# Patient Record
Sex: FEMALE | Race: WHITE | NOT HISPANIC OR LATINO | Employment: PART TIME | URBAN - METROPOLITAN AREA
[De-identification: names, ages, dates, MRNs, and addresses within clinical notes are randomized per-mention and may not be internally consistent; named-entity substitution may affect disease eponyms.]

---

## 2003-02-20 LAB — EXTERNAL HIV SCREEN: NORMAL

## 2018-01-09 ENCOUNTER — GENERIC CONVERSION - ENCOUNTER (OUTPATIENT)
Dept: OTHER | Facility: OTHER | Age: 43
End: 2018-01-09

## 2018-01-11 ENCOUNTER — ALLSCRIPTS OFFICE VISIT (OUTPATIENT)
Dept: PSYCHOLOGY | Facility: CLINIC | Age: 43
End: 2018-01-11
Payer: COMMERCIAL

## 2018-01-11 PROCEDURE — G0410 GRP PSYCH PARTIAL HOSP 45-50: HCPCS | Performed by: PSYCHIATRY & NEUROLOGY

## 2018-01-11 PROCEDURE — 90791 PSYCH DIAGNOSTIC EVALUATION: CPT | Performed by: PSYCHIATRY & NEUROLOGY

## 2018-01-11 PROCEDURE — G0177 OPPS/PHP; TRAIN & EDUC SERV: HCPCS | Performed by: PSYCHIATRY & NEUROLOGY

## 2018-01-11 PROCEDURE — G0176 OPPS/PHP;ACTIVITY THERAPY: HCPCS | Performed by: PSYCHIATRY & NEUROLOGY

## 2018-01-12 ENCOUNTER — APPOINTMENT (OUTPATIENT)
Dept: PSYCHOLOGY | Facility: CLINIC | Age: 43
End: 2018-01-12
Payer: COMMERCIAL

## 2018-01-12 ENCOUNTER — GENERIC CONVERSION - ENCOUNTER (OUTPATIENT)
Dept: OTHER | Facility: OTHER | Age: 43
End: 2018-01-12

## 2018-01-12 PROCEDURE — G0177 OPPS/PHP; TRAIN & EDUC SERV: HCPCS | Performed by: PSYCHIATRY & NEUROLOGY

## 2018-01-12 PROCEDURE — G0176 OPPS/PHP;ACTIVITY THERAPY: HCPCS | Performed by: PSYCHIATRY & NEUROLOGY

## 2018-01-12 PROCEDURE — G0410 GRP PSYCH PARTIAL HOSP 45-50: HCPCS | Performed by: PSYCHIATRY & NEUROLOGY

## 2018-01-15 ENCOUNTER — GENERIC CONVERSION - ENCOUNTER (OUTPATIENT)
Dept: OTHER | Facility: OTHER | Age: 43
End: 2018-01-15

## 2018-01-15 ENCOUNTER — APPOINTMENT (OUTPATIENT)
Dept: PSYCHOLOGY | Facility: CLINIC | Age: 43
End: 2018-01-15
Payer: COMMERCIAL

## 2018-01-15 PROCEDURE — G0410 GRP PSYCH PARTIAL HOSP 45-50: HCPCS | Performed by: PSYCHIATRY & NEUROLOGY

## 2018-01-15 PROCEDURE — G0177 OPPS/PHP; TRAIN & EDUC SERV: HCPCS | Performed by: PSYCHIATRY & NEUROLOGY

## 2018-01-15 PROCEDURE — G0176 OPPS/PHP;ACTIVITY THERAPY: HCPCS | Performed by: PSYCHIATRY & NEUROLOGY

## 2018-01-16 ENCOUNTER — GENERIC CONVERSION - ENCOUNTER (OUTPATIENT)
Dept: OTHER | Facility: OTHER | Age: 43
End: 2018-01-16

## 2018-01-17 ENCOUNTER — GENERIC CONVERSION - ENCOUNTER (OUTPATIENT)
Dept: OTHER | Facility: OTHER | Age: 43
End: 2018-01-17

## 2018-01-18 ENCOUNTER — APPOINTMENT (OUTPATIENT)
Dept: PSYCHOLOGY | Facility: CLINIC | Age: 43
End: 2018-01-18
Payer: COMMERCIAL

## 2018-01-18 ENCOUNTER — GENERIC CONVERSION - ENCOUNTER (OUTPATIENT)
Dept: OTHER | Facility: OTHER | Age: 43
End: 2018-01-18

## 2018-01-18 PROCEDURE — G0176 OPPS/PHP;ACTIVITY THERAPY: HCPCS | Performed by: PSYCHIATRY & NEUROLOGY

## 2018-01-18 PROCEDURE — G0410 GRP PSYCH PARTIAL HOSP 45-50: HCPCS | Performed by: PSYCHIATRY & NEUROLOGY

## 2018-01-18 PROCEDURE — G0177 OPPS/PHP; TRAIN & EDUC SERV: HCPCS | Performed by: PSYCHIATRY & NEUROLOGY

## 2018-01-19 ENCOUNTER — APPOINTMENT (OUTPATIENT)
Dept: LAB | Facility: CLINIC | Age: 43
End: 2018-01-19
Payer: COMMERCIAL

## 2018-01-19 ENCOUNTER — GENERIC CONVERSION - ENCOUNTER (OUTPATIENT)
Dept: OTHER | Facility: OTHER | Age: 43
End: 2018-01-19

## 2018-01-19 ENCOUNTER — APPOINTMENT (OUTPATIENT)
Dept: PSYCHOLOGY | Facility: CLINIC | Age: 43
End: 2018-01-19
Payer: COMMERCIAL

## 2018-01-19 ENCOUNTER — TRANSCRIBE ORDERS (OUTPATIENT)
Dept: LAB | Facility: CLINIC | Age: 43
End: 2018-01-19

## 2018-01-19 DIAGNOSIS — F33.2 SEVERE RECURRENT MAJOR DEPRESSION WITHOUT PSYCHOTIC FEATURES (HCC): Primary | ICD-10-CM

## 2018-01-19 DIAGNOSIS — F33.2 SEVERE RECURRENT MAJOR DEPRESSION WITHOUT PSYCHOTIC FEATURES (HCC): ICD-10-CM

## 2018-01-19 LAB
ALBUMIN SERPL BCP-MCNC: 3.8 G/DL (ref 3.5–5)
ALP SERPL-CCNC: 88 U/L (ref 46–116)
ALT SERPL W P-5'-P-CCNC: 19 U/L (ref 12–78)
ANION GAP SERPL CALCULATED.3IONS-SCNC: 4 MMOL/L (ref 4–13)
AST SERPL W P-5'-P-CCNC: 9 U/L (ref 5–45)
BILIRUB SERPL-MCNC: 0.35 MG/DL (ref 0.2–1)
BUN SERPL-MCNC: 15 MG/DL (ref 5–25)
CALCIUM SERPL-MCNC: 9.3 MG/DL (ref 8.3–10.1)
CHLORIDE SERPL-SCNC: 106 MMOL/L (ref 100–108)
CHOLEST SERPL-MCNC: 208 MG/DL (ref 50–200)
CO2 SERPL-SCNC: 25 MMOL/L (ref 21–32)
CREAT SERPL-MCNC: 0.93 MG/DL (ref 0.6–1.3)
ERYTHROCYTE [DISTWIDTH] IN BLOOD BY AUTOMATED COUNT: 14.1 % (ref 11.6–15.1)
GFR SERPL CREATININE-BSD FRML MDRD: 75 ML/MIN/1.73SQ M
GLUCOSE P FAST SERPL-MCNC: 109 MG/DL (ref 65–99)
HCT VFR BLD AUTO: 46.3 % (ref 34.8–46.1)
HDLC SERPL-MCNC: 25 MG/DL (ref 40–60)
HGB BLD-MCNC: 15.8 G/DL (ref 11.5–15.4)
MCH RBC QN AUTO: 30.9 PG (ref 26.8–34.3)
MCHC RBC AUTO-ENTMCNC: 34.1 G/DL (ref 31.4–37.4)
MCV RBC AUTO: 91 FL (ref 82–98)
PLATELET # BLD AUTO: 249 THOUSANDS/UL (ref 149–390)
PMV BLD AUTO: 11.2 FL (ref 8.9–12.7)
POTASSIUM SERPL-SCNC: 4.3 MMOL/L (ref 3.5–5.3)
PROT SERPL-MCNC: 7.9 G/DL (ref 6.4–8.2)
RBC # BLD AUTO: 5.11 MILLION/UL (ref 3.81–5.12)
SODIUM SERPL-SCNC: 135 MMOL/L (ref 136–145)
TRIGL SERPL-MCNC: 556 MG/DL
WBC # BLD AUTO: 9.83 THOUSAND/UL (ref 4.31–10.16)

## 2018-01-19 PROCEDURE — 80061 LIPID PANEL: CPT

## 2018-01-19 PROCEDURE — G0410 GRP PSYCH PARTIAL HOSP 45-50: HCPCS | Performed by: PSYCHIATRY & NEUROLOGY

## 2018-01-19 PROCEDURE — G0177 OPPS/PHP; TRAIN & EDUC SERV: HCPCS | Performed by: PSYCHIATRY & NEUROLOGY

## 2018-01-19 PROCEDURE — 36415 COLL VENOUS BLD VENIPUNCTURE: CPT

## 2018-01-19 PROCEDURE — 80053 COMPREHEN METABOLIC PANEL: CPT

## 2018-01-19 PROCEDURE — 85027 COMPLETE CBC AUTOMATED: CPT

## 2018-01-19 PROCEDURE — G0176 OPPS/PHP;ACTIVITY THERAPY: HCPCS | Performed by: PSYCHIATRY & NEUROLOGY

## 2018-01-22 ENCOUNTER — GENERIC CONVERSION - ENCOUNTER (OUTPATIENT)
Dept: OTHER | Facility: OTHER | Age: 43
End: 2018-01-22

## 2018-01-22 ENCOUNTER — APPOINTMENT (OUTPATIENT)
Dept: PSYCHOLOGY | Facility: CLINIC | Age: 43
End: 2018-01-22
Payer: COMMERCIAL

## 2018-01-22 PROCEDURE — G0176 OPPS/PHP;ACTIVITY THERAPY: HCPCS | Performed by: PSYCHIATRY & NEUROLOGY

## 2018-01-22 PROCEDURE — G0410 GRP PSYCH PARTIAL HOSP 45-50: HCPCS | Performed by: PSYCHIATRY & NEUROLOGY

## 2018-01-22 PROCEDURE — G0177 OPPS/PHP; TRAIN & EDUC SERV: HCPCS | Performed by: PSYCHIATRY & NEUROLOGY

## 2018-01-23 ENCOUNTER — APPOINTMENT (OUTPATIENT)
Dept: PSYCHOLOGY | Facility: CLINIC | Age: 43
End: 2018-01-23
Payer: COMMERCIAL

## 2018-01-23 ENCOUNTER — GENERIC CONVERSION - ENCOUNTER (OUTPATIENT)
Dept: OTHER | Facility: OTHER | Age: 43
End: 2018-01-23

## 2018-01-23 VITALS
SYSTOLIC BLOOD PRESSURE: 130 MMHG | BODY MASS INDEX: 28.7 KG/M2 | HEART RATE: 91 BPM | TEMPERATURE: 96.4 F | HEIGHT: 65 IN | DIASTOLIC BLOOD PRESSURE: 77 MMHG | WEIGHT: 172.25 LBS | RESPIRATION RATE: 18 BRPM

## 2018-01-23 PROCEDURE — G0176 OPPS/PHP;ACTIVITY THERAPY: HCPCS | Performed by: PSYCHIATRY & NEUROLOGY

## 2018-01-23 PROCEDURE — G0177 OPPS/PHP; TRAIN & EDUC SERV: HCPCS | Performed by: PSYCHIATRY & NEUROLOGY

## 2018-01-23 PROCEDURE — G0410 GRP PSYCH PARTIAL HOSP 45-50: HCPCS | Performed by: PSYCHIATRY & NEUROLOGY

## 2018-01-23 NOTE — PSYCH
History of Present Illness  [facility] Clinical Progress Note [facility]:   Specialized Services Documentation - Therapist must complete separate progress note for each specific clinical activity in which the client participated during the day  ( ) Other 1430 This CM spoke with Joellen Sanabria (UR) today  She stated that she would extend LOS until tomm  due to one day of Program missed today  UR set for [facility] 1/17/18  Complete review will be done requesting extension of LOS  [phone]  Fidel Rodriguez RN     Case Management Note:   0900 ORLÉDAISY could not make it to Program due to weather conditions in her area (heavy snow)  She called to cancel  Medications not changed/added/denied  Fidel Rodriguez RN      Active Problems    1  Generalized anxiety disorder (300 02) (F41 1)   2  Major depressive disorder, recurrent severe without psychotic features (296 33) (F33 2)    Past Medical History    1  Denied: History of seizure   2  Denied: History of traumatic injury of head    Allergies    1  Percocet TABS    Current Meds   1  ARIPiprazole 2 MG Oral Tablet; TAKE 1 TABLET DAILY; Therapy: (Recorded:12Jan2018) to Recorded   2  DULoxetine HCl - 30 MG Oral Capsule Delayed Release Particles; TAKE 1 CAPSULE   Bedtime; Therapy: (Recorded:11Jan2018) to Recorded   3  DULoxetine HCl - 60 MG Oral Capsule Delayed Release Particles; TAKE 1 CAPSULE   Bedtime; Therapy: (Recorded:11Jan2018) to Recorded   4  LORazepam 0 5 MG Oral Tablet; Take 1 tablet twice daily; Therapy: (Recorded:11Jan2018) to Recorded   5  TraZODone HCl - 150 MG Oral Tablet; TAKE 1 TABLET Bedtime; Therapy: (Recorded:11Jan2018) to Recorded    Family Psych History  Father    1  Family history of depression (V17 0) (Z81 8)  Sister    2   Family history of paranoid schizophrenia (V17 0) (Z81 8)    Social History    · Current every day smoker (305 1) (F17 200)   · Daily caffeine consumption   · Employed   · Graduated from high school   ·     Future Appointments    Date/Time Provider Specialty Site   01/18/2018 11:15 AM KAR Greene  Psychiatry [facility] PARTIAL H[facility]   01/19/2018 11:45 AM KAR Greene   Psychiatry [facility] PARTIAL HOSPITALIZATION   01/17/2018 10:00 AM Armin Vidal MD Psychiatry [facility] PARTIAL HOSPITALIZATION     Signatures   Electronically signed by : Tana Michael RN; Jan 16 2018  3:26PM EST                       (Author)

## 2018-01-23 NOTE — PSYCH
History of Present Illness  [facility]:   Specialized Services Documentation - Therapist must complete separate progress note for each specific clinical activity in which the client participated during the day  (320) Group Psychotherapy: [id number] Oscar El participated in wellness group focused on personal medication management  Oscar El shared that she prefers to discuss her medications with her provider rather than reading the inserts in packaging as she becomes "too anxious" reading all the side effects that "could" happen  She stated that her provider is excellent at educating her and if she forgets questions or concerns, she can follow up and call them  She discussed with peers importance of knowing what medications are being taken and why and how to educate yourself properly about the medications you are taking  Oscar El made good progress toward goals  Continue to offer this education and support group for forming healthy behaviors with medications prescribed as well as recovery strategies and commonly asked questions regarding medication management  Treatment Plan Problem(s): 1 1,1 2  Tana Michael RN     (689) Group Psychotherapy: 8637-1526  Oscar El participated in psychotherapy group that focused on taking responsibility for one's actions despite one's illness as well as learning how to cope with negative thoughts and emotions  She identified "worrying about going back to work and what will happen after program is over" as a stressor for today  Oscar El minimally participated in group discussion, sharing that she often worries about what the future may hold  She expressed that she has been struggling with her emotions regarding discharge from program, despite this being the third day, and whether she will have enough outside supports to assist in her care  Peer support and a mutual understanding was presented  Minimal progress noted towards goals   Continue psychotherapy group to encourage Steph Dutton to explore stressors and find new ways of coping  Yesenia Up MSW Intern  Treatment Plan Problem(s): 1 1, 1 2  Roberto Carlos Butts MSW, LSW       (910) Education Therapy Goals set - cook a good meal     Treatment Plan Problem(s): 1 1  Education Therapy Time - 0900 - 0930 Previous goal was met  Readiness to Learning:  She is receptive to learning  There are  no barriers to learning  Learning Assessment Time - 1330 - 1400   Education completed on  illness, medication and wellness tools  The teaching method was  verbal and written  Shared area of learning: Yes  MOHAMUD Palma     (099) Allied Therapy 5867-2739 Steph Dutton actively shared in [facility] group focused on DBT skill mindfulness  She engaged in progressive muscle relaxation, instrument observation and improvisation  Group explored how to observe, describe, and participate to practice mindfulness  Group discussed musical element's relation to being mindful when communicating with others  Steph Dutton shared that the progressive muscle relaxation was beneficial to her and she wants to start doing it on her own  Some progress towards goal observed and shared  Continue AT to further practice mindfulness to promote recovery  MASHA Wilson  Treatment Plan Problem(s): 1 1  MOHAMUD Palma       TREATMENT SESSION NUMBER: 3    Sera Christopher RN      Active Problems    1  Generalized anxiety disorder (300 02) (F41 1)   2  Major depressive disorder, recurrent severe without psychotic features (296 33) (F33 2)    Past Medical History    1  Denied: History of seizure   2  Denied: History of traumatic injury of head    Allergies    1  Percocet TABS    Current Meds   1  ARIPiprazole 2 MG Oral Tablet; TAKE 1 TABLET DAILY; Therapy: (Recorded:12Jan2018) to Recorded   2  DULoxetine HCl - 30 MG Oral Capsule Delayed Release Particles; TAKE 1 CAPSULE   Bedtime; Therapy: (Recorded:11Jan2018) to Recorded   3   DULoxetine HCl - 60 MG Oral Capsule Delayed Release Particles; TAKE 1 CAPSULE   Bedtime; Therapy: (Recorded:11Jan2018) to Recorded   4  LORazepam 0 5 MG Oral Tablet; Take 1 tablet twice daily; Therapy: (Recorded:11Jan2018) to Recorded   5  TraZODone HCl - 150 MG Oral Tablet; TAKE 1 TABLET Bedtime; Therapy: (Recorded:11Jan2018) to Recorded    Family Psych History  Father    1  Family history of depression (V17 0) (Z81 8)  Sister    2  Family history of paranoid schizophrenia (V17 0) (Z81 8)    Social History    · Current every day smoker (305 1) (F17 200)   · Daily caffeine consumption   · Employed   · Graduated from high school   ·     Future Appointments    Date/Time Provider Specialty Site   01/16/2018 12:00 PM Ambika Riddle M D  Psychiatry [facility] PARTIAL HOSPITALIZATION   01/18/2018 11:15 AM KAR Medina  [facility] HOSPITALIZATION   01/19/2018 11:45 AM KAR Medina   Psychiatry [facility] HOSPITALIZATION   01/17/2018 10:00 AM Carter Mckoy MD Psychiatry [facility] HOSPITALIZATION     Signatures   Electronically signed by : Irina Yeung RN; Raymond 15 2018 11:25AM EST                       (Author)    Electronically signed by : SUHAS Zapata; Raymond 15 2018  1:24PM EST                       (Author)    Electronically signed by : MOHAMUD Dodson; Raymond 15 2018  2:21PM EST                       (Author)    Electronically signed by : SUHAS Wilks; Raymond 15 2018  2:33PM EST                       (Author)    Electronically signed by : MASHA Nolen; Raymond 15 2018  3:04PM EST                       (Author)

## 2018-01-23 NOTE — PSYCH
In[facility]ations Treatment Plan    [facility] Treatment Plan   AREAS OF NEEDS: Severe depression and anxiety as manifested by suicidal thoughts without a plan, sleep disturbances, poor concentration, isolation and spending most of time in bed past three months, crying often, feeling hopeless and helpless and getting no kristian in life  Date Initiated: 18     LONG TERM GOAL: 1 0 I will identify three signs that I am more in control of my mood, less depressed and anxious and more productive in my daily life  Date Initiated: 18   Target Date: 18      Date Initiated: 18    1 1 I will identify three things I need to do on a daily basis to take care of myself  Target Date: 18    Date Initiated: 18    1 2 I will name two things I can do to decrease my isolation and increase healthy socialization and support  Target Date: 18    Date Initiated: 18    1 3 I will take Abilify as ordered and I will bring any questions or concerns I have regarding my medications to CM/Program Psychiatrist when or if they occur  Target Date: 18    Date Initiated: 18    1 4 I will identify four supports and state how each of my supports will help me in my recovery  Target Date: 18           7 DAY REVISION: 1 1,1 2,1 3,1 4 Continue goals as they remain relevant but unmet , 1 5 I will name two ways I can increase socialization and support and decrease isolation  Date Initiated: 18    Target Date: 18    Date Initiated: 18    Target Date: 18              PSYCHIATRY: Medication management and education  Continue medication management and education  Date Initiated: 2018   Revision Date: 2018   The person(s) responsible for carrying out the plan is Fabio Andrade MD    NURSIN 1,1 2,1 3,1 4 This RN will provide daily wellness group five days weekly to educate Gadiel Webb on S/S of her diagnoses and medications used in treatment     1 1,1 2,1 3,1 4,1 5 This RN will continue to provide daily wellness group to educate George Austin on her diagnoses and medications  Date Initiated: 1/11/18     Revision Date: 1/22/18     The person(s) responsible for carrying out the plan is Glorine Gaucher, RN    PSYCHOLOGY: 1 1, 1 2, 1 4 Provide psychotherapy group 5 times per week to allow opportunity for George Austin to explore stressors and ways of coping  1 1, 1 2, 1 4 continue to provide psychotherapy group each program day to encourage George Austin to further explore stressors and healthier ways of coping  Date Initiated: 1/11/2018   Revision Date: 1/22/2018   The person(s) responsible for carrying out the plan is LAVERNE Berger LSW  ALLIED THERAPY: 1 1, 1 2 Provide Marleni AT group 5 times weekly to aid in understanding and use of wellness tools through engagement and discussion of meaningful tasks  MASHA Cazares   1 1, 1 2 Continue to provide Marleni AT group 5 times weekly to aid in understand and use of wellness tools, and transfer of skills to home, to promote recovery through meaningful tasks  MASHA Cazares     Date Initiated: 1/11/18   Revision Date: 1/22/18   The person(s) responsible for carrying out the plan is Ernesta Hamman, MT-BC  CASE MANAGEMENT: 1 0 This CM will meet regularly with George Austin to assess progress in Program, provide assistance with D/C planning, UR, and supports building  Date Initiated: 1/11/18   Revision Date: 1/22/18   The person(s) responsible for carrying out the plan is Glorine Gaucher, RN  DISCHARGE CRITERIA: I will identify three signs of improvement and complete my relapse prevention plan  DISCHARGE PLAN: George Austin will arrange outpatient therapist once information obtained from her insurance company and she will return to Dr Constantine Zacarias at his office in [place], [place] after D/C     Estimated Length of Stay: 10 days   Strengths: Responsible, motivated to increase level of functioning and decrease depression and anxiety Limitations: Financial issues due to not working, feeling guilty about being depressed (for her children/family) "putting them through this"   Diagnosis and Treatment Plan explained to patient, patient relates understanding diagnosis and is agreeable to Treatment Plan           CLIENT COMMENTS / Please share your thoughts, feelings, need and/or experiences regarding your treatment plan: _____________________________________________________________________________________________________________________________________________________________________________________________________________________________________________________________________________________________________________________ Date/Time: ______________                 Patient Signature: _________________________________ Date/Time: ______________      Signatures   Electronically signed by : KAR Calzada ; Jan 11 2018  8:58AM EST                       (Author)    Electronically signed by : SUHAS Tolliver; Jan 11 2018  9:09AM EST                       (Author)    Electronically signed by : MASHA Wilson; Jan 11 2018 10:04AM EST                       (Author)    Electronically signed by : MOHAMUD Palma; Jan 11 2018 10:27AM EST                       (Author)    Electronically signed by : Sera Christopher RN; Jan 11 2018  4:09PM EST                       (Author)    Electronically signed by : Sera Christopher RN; Raymond 15 2018 12:32PM EST                       (Author)    Electronically signed by : MASHA Wilson; Jan 22 2018  2:13PM EST                       (Author)    Electronically signed by : MOHAMUD Palma; Jan 22 2018  2:14PM EST                       (Author)    Electronically signed by : SUHAS Tolliver; Jan 22 2018  2:23PM EST                       (Author)    Electronically signed by : Sera Christopher RN; Jan 22 2018  2:57PM EST                       (Author)    Electronically signed by : Sera Christopher RN; Jan 22 2018  2:57PM EST                       (Author)    Electronically signed by : KAR Zhang ; Jan 22 2018  4:30PM EST                       (Author)

## 2018-01-23 NOTE — PSYCH
History of Present Illness  [facility] Clinical Progress Note [facility]:   Specialized Services Documentation - Therapist must complete separate progress note for each specific clinical activity in which the client participated during the day  (650) Group Psychotherapy: 8942-3147 Donna Pollard participated in wellness group focused on research that revealed the close association between sleep disturbances and increased depression  Sleep hygiene quiz was completed and discussed as well as strategies to improve one's own sleep disturbances and improve overall quality of sleep  Donna Pollard actively shared in education, completion of quiz and peer discussion regarding her own challenges with sleep  She shared that she has been feeling very tired since depression has increased and has to keep herself on a routine not to "over sleep  Donna Pollard made progress toward goals  Continue to offer group to provide education on sleep hygiene for improved physical and mental health wellness and recovery  Treatment Plan Problem(s): 1 1,1 2  Sathish Mariano RN     (646) Group Psychotherapy: ([phone]) Marleni attended psychotherapy group focused on the benefits of peer support, as well as importance of communicating needs and concerns with loved ones  Donna Pollard identified having trouble focusing as a stressor today  She quietly engaged in group discussion, talking about how she focuses on how much it would hurt her children if she hurt herself as a way that she motivates herself to work on her well-being  Group discussed the positive effect that support from peers can have on one's well-being, as well as the importance of communicating about feelings and needs with peers and loved ones  Mild progress noted toward goals today  Continue psychotherapy group to encourage Donna Pollard to explore stressors and coping  Treatment Plan Problem(s): 1 1, 1 2   Gilberto GALLOWAY, LSW       (341) Education Therapy Goals set - Start journaling    Treatment Plan Problem(s): 1 1, 1 2  Education Therapy Time - 0900 - 0930 Previous goal was met  Readiness to Learning:  She is receptive to learning  There are  no barriers to learning  Learning Assessment Time - 1330 - 1400   Education completed on  illness and wellness tools  The teaching method was  verbal, written and demonstration  Shared area of learning: Yes  MASHA Tolliver MT-BC     (012) Allied Therapy [id number] Stephanie Knox actively shared in [facility] group focused on setting boundaries and DBT module Interpersonal Effectiveness  She engaged in task of instrument improvisation and role-play boundary setting  Group explored ways to set boundaries and strengthening of internal boundaries  Group was introduced to GIVE as a way to keep the relationship while boundary setting  She was given hand-outs on topic  Stephanie Knox shared that she preferred the instrument improvisation with strict boundaries, and she knows that she needs to know exactly [facility] I stand and what to doâ  Some good progress towards goal shared  Continue AT to further strengthen boundaries  MASHA Tolliver  Treatment Plan Problem(s): 1 1, 1 2  MOHAMUD Herzog     ( ) Other 3184 As per Jennifer's instructions (insurance reviewer at [facility]) this CM left VM review for Stephanie Knox as today is her LCD and request was made for additional PHP days  Waiting for return call from Norberto Sanches authorizing additional days and this will be communicated to Stephanie Carrascoe who also feels she is not ready to leave Program  Jovany Turner, RN     Case Management Note:   [id number] Stephanie Knox met with this CM to discuss Abilify 2 mg is helping and she notices she is crying less during the day since beginning Abilify  She reports that she has fewer thoughts of suicide (fleeting) but continues to have some suicidal thoughts but no plan  Stephanie Carrascoe complained of "extreme tiredness" all the time and wondered if it could be a SE of [place] Workman was consulted by this CM/RN and she stated that this is not a side effect of Abilify and that time of taking Abilify can be moved up from Marleni's bedtime at 10PM to earlier in the evening to see if this would help especially with getting up in the morning feeling very tired  This was discussed with Eddy Lopez and she stated that she will try this adjustment this evening  No SI and no HI were reported  TREATMENT SESSION NUMBER: 4   Current suicide risk is low  Medications not changed/added/denied  Cecile Solano RN      Active Problems    1  Generalized anxiety disorder (300 02) (F41 1)   2  Major depressive disorder, recurrent severe without psychotic features (296 33) (F33 2)    Past Medical History    1  Denied: History of seizure   2  Denied: History of traumatic injury of head    Allergies    1  Percocet TABS    Current Meds   1  ARIPiprazole 2 MG Oral Tablet; TAKE 1 TABLET DAILY; Therapy: (Recorded:12Jan2018) to Recorded   2  DULoxetine HCl - 30 MG Oral Capsule Delayed Release Particles; TAKE 1 CAPSULE   Bedtime; Therapy: (Recorded:11Jan2018) to Recorded   3  DULoxetine HCl - 60 MG Oral Capsule Delayed Release Particles; TAKE 1 CAPSULE   Bedtime; Therapy: (Recorded:11Jan2018) to Recorded   4  LORazepam 0 5 MG Oral Tablet; Take 1 tablet twice daily; Therapy: (Recorded:11Jan2018) to Recorded   5  TraZODone HCl - 150 MG Oral Tablet; TAKE 1 TABLET Bedtime; Therapy: (Recorded:11Jan2018) to Recorded    Family Psych History  Father    1  Family history of depression (V17 0) (Z81 8)  Sister    2  Family history of paranoid schizophrenia (V17 0) (Z81 8)    Social History    · Current every day smoker (305 1) (F17 200)   · Daily caffeine consumption   · Employed   · Graduated from high school   ·     Future Appointments    Date/Time Provider Specialty Site   01/19/2018 11:45 AM KAR Young   Psychiatry [facility] PARTIAL HOSPITALIZATION   01/22/2018 11:15 AM KAR Young  Psychiatry [facility] PARTIAL HOSPITALIZATION     Signatures   Electronically signed by : GREGORY WadsworthW; Jan 18 2018 12:32PM EST                       (Author)    Electronically signed by : MASHA Skaggs; Jan 18 2018  2:42PM EST                       (Author)    Electronically signed by : MOHAMUD Buck; Jan 18 2018  2:43PM EST                       (Author)    Electronically signed by : Dawson Hodgkins, RN; Jan 18 2018  4:33PM EST                       (Author)

## 2018-01-23 NOTE — PSYCH
History of Present Illness  [facility] Clinical Progress Note [facility]:   Specialized Services Documentation - Therapist must complete separate progress note for each specific clinical activity in which the client participated during the day  (375) Group Psychotherapy: ([phone]) Bert Chang participated in psychotherapy group focused on the benefits of PHP program, as well as managing anxiety  Bert Chang identified the weekend and lack of program structure as a stressor  She noted that her daughter's 22th birthday is tomorrow, so she will try to focus on that happy event to get her through the weekend  Group discussed the benefits they have gotten from the program, such as significant comfort from peers that understand what they are going through, as well as structure and goal setting  Bert Chang noted that peer support has been helpful for her  Mild progress noted toward goals  Continue psychotherapy group to encourage Bert Chang to explore stressors and coping  Treatment Plan Problem(s): 1 1, 1 2, 1 4  Emaline Holstein MSW, LSW     (504) Group Psychotherapy: [id number] Bert Chang participated in weekly wellness group to discuss what particular area of wellness that has been worked on up to today in Program and choice of area to continue working on for recovery as targeted in treatment plan, by choice or in WRAP  Regan Poole Bert Chang shared that she will work on emotional as she continues to feel depressed and is isolating although less since beginning [address] otherwise was more quiet in group although attentive to active peer discussion  Bert Chang made moderate progress toward goals  Continue to offer weekly wellness as a strategy to offer opportunity to focus on goals and identify areas of goal achievement and need  Treatment Plan Problem(s): 1 1,1 2,1 4  Lasalle Oppenheim, RN       (325) Education Therapy Goals set - Journal    Treatment Plan Problem(s): 1 1, 1 2  Education Therapy Time - 0900 - 0930 Previous goal was not met  Readiness to Learning:  She is receptive to learning  There are  no barriers to learning  Learning Assessment Time - 1330 - 1400   Education completed on  illness and wellness tools  The teaching method was  verbal, written and demonstration  Shared area of learning: Yes  MASHA Brito MT-BC     (707) Allied Therapy 9645-2495 Rick Moyer actively shared in [facility] group focused on managing emotions  Group utilized mamie analysis and task to explore prompting events, urges, myths and healthy responses to emotions  Group discussed learning to acknowledge feeling uncomfortable and encourage to not let it define oneself  She was active in small group task and able to give examples  Group stressed normalcy of discomfort and ability to focus on ways to feel more comfortable even in the moment (DBT handout utilized)  She shared in group the use of WRAP as a tool to help her manage difficult emotions without prompts  Some effort toward goal noted  Continue AT to encourage personal role in self-care and emotional regulation  Treatment Plan Problem(s): 1 1,1 2  MOHAMUD Rodriguez       Case Management Note:   Lovvaibhav Henry met with this CM to review progress in Program  She stated that she feels the Program is helping as is her Abilify that she is taking regularly  She noted that she had been taking an afternoon nap for several hours after Program and discussed cutting back on nap in afternoon so improve sleep  She did not take Abilify earlier last evening but will begin to do so this weekend she stated  Healther stated that she will begin journaling and working with her WRAP this weekend  She denied SI and HI as well as any SE from her medications  Discussed extension of LOS and supports for D/C  TREATMENT SESSION NUMBER: 5   Current suicide risk is low  Medications not changed/added/denied  Emelyn Robb RN      Active Problems    1  Generalized anxiety disorder (300 02) (F41 1)   2   Major depressive disorder, recurrent severe without psychotic features (296 33) (F33 2)    Past Medical History    1  Denied: History of seizure   2  Denied: History of traumatic injury of head    Allergies    1  Percocet TABS    Current Meds   1  ARIPiprazole 2 MG Oral Tablet; TAKE 1 TABLET DAILY; Therapy: (Recorded:12Jan2018) to Recorded   2  DULoxetine HCl - 30 MG Oral Capsule Delayed Release Particles; TAKE 1 CAPSULE   Bedtime; Therapy: (Recorded:11Jan2018) to Recorded   3  DULoxetine HCl - 60 MG Oral Capsule Delayed Release Particles; TAKE 1 CAPSULE   Bedtime; Therapy: (Recorded:11Jan2018) to Recorded   4  LORazepam 0 5 MG Oral Tablet; Take 1 tablet twice daily; Therapy: (Recorded:11Jan2018) to Recorded   5  TraZODone HCl - 150 MG Oral Tablet; TAKE 1 TABLET Bedtime; Therapy: (Recorded:11Jan2018) to Recorded    Family Psych History  Father    1  Family history of depression (V17 0) (Z81 8)  Sister    2  Family history of paranoid schizophrenia (V17 0) (Z81 8)    Social History    · Current every day smoker (305 1) (F17 200)   · Daily caffeine consumption   · Employed   · Graduated from high school   ·     Future Appointments    Date/Time Provider Specialty Site   01/22/2018 11:15 AM KAR Jimenez  Psychiatry [facility] PART[facility] HOSPITALIZATION   01/23/2018 11:00 AM KAR Jimenez   Psychiatry [facility] HOSPITALIZATION     Signatures   Electronically signed by : SUHAS Heaton; Jan 19 2018 11:02AM EST                       (Author)    Electronically signed by : MOHAMUD Holley; Jan 19 2018 12:03PM EST                       (Author)    Electronically signed by : MASHA Suarez; Jan 19 2018  2:26PM EST                       (Author)    Electronically signed by : Yamel Carter RN; Jan 19 2018  3:44PM EST                       (Author)    Electronically signed by : Yamel Carter RN; Jan 19 2018  3:47PM EST                       (Author)

## 2018-01-24 ENCOUNTER — OFFICE VISIT (OUTPATIENT)
Dept: PSYCHOLOGY | Facility: CLINIC | Age: 43
End: 2018-01-24
Payer: COMMERCIAL

## 2018-01-24 DIAGNOSIS — F41.1 GENERALIZED ANXIETY DISORDER: ICD-10-CM

## 2018-01-24 DIAGNOSIS — F33.2 MAJOR DEPRESSIVE DISORDER, RECURRENT SEVERE WITHOUT PSYCHOTIC FEATURES (HCC): Primary | ICD-10-CM

## 2018-01-24 PROCEDURE — G0177 OPPS/PHP; TRAIN & EDUC SERV: HCPCS

## 2018-01-24 PROCEDURE — G0176 OPPS/PHP;ACTIVITY THERAPY: HCPCS

## 2018-01-24 PROCEDURE — G0410 GRP PSYCH PARTIAL HOSP 45-50: HCPCS

## 2018-01-24 PROCEDURE — 99999 PR OFFICE/OUTPT VISIT,PROCEDURE ONLY: CPT | Performed by: PSYCHIATRY & NEUROLOGY

## 2018-01-24 RX ORDER — DULOXETIN HYDROCHLORIDE 30 MG/1
30 CAPSULE, DELAYED RELEASE ORAL
COMMUNITY
End: 2018-10-10 | Stop reason: HOSPADM

## 2018-01-24 RX ORDER — LORAZEPAM 0.5 MG/1
0.5 TABLET ORAL 2 TIMES DAILY
Status: ON HOLD | COMMUNITY
End: 2018-10-04

## 2018-01-24 RX ORDER — ARIPIPRAZOLE 2 MG/1
2 TABLET ORAL DAILY
COMMUNITY
End: 2018-10-01

## 2018-01-24 RX ORDER — DULOXETIN HYDROCHLORIDE 60 MG/1
60 CAPSULE, DELAYED RELEASE ORAL
COMMUNITY
End: 2018-10-10 | Stop reason: HOSPADM

## 2018-01-24 RX ORDER — TRAZODONE HYDROCHLORIDE 150 MG/1
100 TABLET ORAL
COMMUNITY
End: 2022-03-28 | Stop reason: ALTCHOICE

## 2018-01-24 NOTE — PSYCH
Subjective:     Patient ID: John Garcia is a 38 y o  female  [facility] Clinical Progress Notes     Specialized Services Documentation  Therapist must complete separate progress note for each specific clinical activity in which the individual participated during the day  Group Psychotherapy ([id number]) [facility] participated in psychotherapy group focused on effective communication and boundaries  [facility] quietly engaged in group discussion, relating to peers about finding it difficult to express needs and boundaries  Group discussed the importance of communicating one's needs clearly, as well as ways to begin to practice healthy communication skills now  Mild progress noted toward goals  Continue psychotherapy group to encourage [facility] to explore stressors and coping    Tx Plan Objective: 1 1, 1 2, 1 4 Therapist:  Phil GALLOWAY

## 2018-01-24 NOTE — PROGRESS NOTES
RIANNA BELL    In[facility]ations Treatment Plan    [facility] Treatment Plan   AREAS OF NEEDS: Severe depression and anxiety as manifested by suicidal thoughts without a plan, sleep disturbances, poor concentration, isolation and spending most of time in bed past three months, crying often, feeling hopeless and helpless and getting no kristian in life  Date Initiated: 18     LONG TERM GOAL: 1 0 I will identify three signs that I am more in control of my mood, less depressed and anxious and more productive in my daily life  Date Initiated: 18   Target Date: 18      Date Initiated: 18    1 1 I will identify three things I need to do on a daily basis to take care of myself  Target Date: 18    Date Initiated: 18    1 2 I will name two things I can do to decrease my isolation and increase healthy socialization and support  Target Date: 18    Date Initiated: 18    1 3 I will take Abilify as ordered and I will bring any questions or concerns I have regarding my medications to CM/Program Psychiatrist when or if they occur  Target Date: 18    Date Initiated: 18    1 4 I will identify four supports and state how each of my supports will help me in my recovery  Target Date: 18           7 DAY REVISION: 1 1,1 2,1 3,1 4 Continue goals as they remain relevant but unmet , 1 5 I will name two ways I can increase socialization and support and decrease isolation  Date Initiated: 18    Target Date: 18    Date Initiated: 18    Target Date: 18              PSYCHIATRY: Medication management and education  Continue medication management and education  Date Initiated: 2018   Revision Date: 2018   The person(s) responsible for carrying out the plan is Ghislaine Gibson MD    NURSIN 1,1 2,1 3,1 4 This RN will provide daily wellness group five days weekly to educate Mary Powell on S/S of her diagnoses and medications used in treatment  1 1,1 2,1 3,1 4,1 5 This RN will continue to provide daily wellness group to educate Bjorn Carmichael on her diagnoses and medications  Date Initiated: 1/11/18     Revision Date: 1/22/18     The person(s) responsible for carrying out the plan is Lacy Rubio RN    PSYCHOLOGY: 1 1, 1 2, 1 4 Provide psychotherapy group 5 times per week to allow opportunity for Bjorn Carmichael to explore stressors and ways of coping  1 1, 1 2, 1 4 continue to provide psychotherapy group each program day to encourage Bjorn Carmichael to further explore stressors and healthier ways of coping  Date Initiated: 1/11/2018   Revision Date: 1/22/2018   The person(s) responsible for carrying out the plan is LAVERNE Leslie LSW  ALLIED THERAPY: 1 1, 1 2 Provide [place] AT group 5 times weekly to aid in understanding and use of wellness tools through engagement and discussion of meaningful tasks  Deshaun [place], MTI   1 1, 1 2 Continue to provide [place] AT group 5 times weekly to aid in understand and use of wellness tools, and transfer of skills to home, to promote recovery through meaningful tasks  Deshaun [place], MTI     Date Initiated: 1/11/18   Revision Date: 1/22/18   The person(s) responsible for carrying out the plan is EDILIA BroBC  CASE MANAGEMENT: 1 0 This CM will meet regularly with Bjorn Carmichael to assess progress in Program, provide assistance with D/C planning, UR, and supports building  Date Initiated: 1/11/18   Revision Date: 1/22/18   The person(s) responsible for carrying out the plan is Lacy Rubio RN  DISCHARGE CRITERIA: I will identify three signs of improvement and complete my relapse prevention plan  DISCHARGE PLAN: Bjorn Carmichael will arrange outpatient therapist once information obtained from her insurance company and she will return to Dr Alfreda Woodall at his office in [place], [place] after D/C     Estimated Length of Stay: 10 days   Strengths: Responsible, motivated to increase level of functioning and decrease depression and anxiety   Limitations: Financial issues due to not working, feeling guilty about being depressed (for her children/family) "putting them through this"   Diagnosis and Treatment Plan explained to patient, patient relates understanding diagnosis and is agreeable to Treatment Plan           CLIENT COMMENTS / Please share your thoughts, feelings, need and/or experiences regarding your treatment plan: _____________________________________________________________________________________________________________________________________________________________________________________________________________________________________________________________________________________________________________________ Date/Time: ______________                 Patient Signature: _________________________________ Date/Time: ______________      Signatures  Electronically signed by : AKR Gilmore ; Jan 11 2018  8:58AM EST                       (Author)  Electronically signed by : SUHAS French; Jan 11 2018  9:09AM EST                       (Author)  Electronically signed by : MASHA Ray; Jan 11 2018 10:04AM EST                       (Author)  Electronically signed by : MOHAMUD Calabrese; Jan 11 2018 10:27AM EST                       (Author)  Electronically signed by : Savanna Bautista RN; Jan 11 2018  4:09PM EST                       (Author)  Electronically signed by : Savanna Bautista RN; Raymond 15 2018 12:32PM EST                       (Author)  Electronically signed by : MASHA Ray; Jan 22 2018  2:13PM EST                       (Author)  Electronically signed by : MOHAMUD Calabrese; Jan 22 2018  2:14PM EST                       (Author)  Electronically signed by : SUHAS French; Jan 22 2018  2:23PM EST                       (Author)  Electronically signed by : Savanna Bautista RN; Jan 22 2018  2:57PM EST                       (Author)  Electronically signed by : Savanna Bautista RN; Jan 22 2018 2:57PM EST                       (Author)  Electronically signed by : KAR Phillips ; Jan 22 2018  4:30PM EST                       (Author)

## 2018-01-24 NOTE — PROGRESS NOTES
Subjective:     Patient ID: Chelsi Gibson is a 38 y o  female  [facility] Clinical Progress Notes     Specialized Services Documentation  Therapist must complete separate progress note for each specific clinical activity in which the individual participated during the day  Group Psychotherapy *** Tx Plan Objective: ***, Therapist:  {SOCORRO:37836}    Group Psychotherapy *** Tx Plan Objective: ***, Therapist:  {JEAN CARLOS:95843}    Group Psychotherapy *** Tx Plan Objective: ***, Therapist:  {TAVON:34504}    Allied Therapy *** Tx Plan Objective: ***, Therapist:  {KARY:65242}    Education Therapy   Time:  3035-8564  ***  Previous goal met: {YES (DEF)/NO:04644}    Readiness to Learning: {Readiness to Learin}    Barriers to Leaning: {Barriers to Learnin}    Learning Assessment  Time: 6099-2727  ***  Education Completed: {Education Completed:41963}    Teaching Method: {Teaching Method:99393}    Shared Area of Learning: {YES (DEF)/NO:11951}    Goal Set: ***    Other ***    Case Management Note    {THERAPIST (Optional):64843}    Current suicide risk : {CURRENT SUICIDE BKOL:04288}    ***    Medications changes/added/denied? {YES [id number]    Treatment session number: {4-68:48627}    Individual Case Management Visit not provided today?  {YES (DEF)/NO:07220}    [facility]ations follow up physician's orders: ***

## 2018-01-24 NOTE — PSYCH
History of Present Illness  [facility] Clinical Progress Note [facility]:   Specialized Services Documentation - Therapist must complete separate progress note for each specific clinical activity in which the client participated during the day  (288) Group Psychotherapy: 9324-8126 Gadiel Webb participated in wellness group focused on identifying one thing that has been difficult to get started working on in recovery and learning how to increase motivation initially and in carrying though with goal  Gadiel Webb shared in identifying roadblocks to motivation within her small group but did not share in larger peer discussion  She is attentive to education and identified fear as one way motivation is affected  Gadiel Webb made mild progress toward goals  Continue to offer group to provide education and practice on how to initially motivate one's self to get started with goal and using eleven minute strategy to maintain motivations  Treatment Plan Problem(s): 1 1,1 2  Giovanni Ramos RN     (060) Group Psychotherapy: 8832-3884  Gadiel Webb participated in psychotherapy group that focused on recognizing one's strengths and forgiving oneself for past perceived mistakes  Handouts on support systems were also provided, as a follow up from yesterday's group discussion  Gadiel Webb identified an increase in anxiety as a stressor for today  Gadiel Webb did not participate in group discussion, but remained attentive to peers throughout the hour  Continue psychotherapy group to encourage Gadiel Webb to explore stressors and find new ways of coping  LAVERNE Oliver Intern  Treatment Plan Problem(s): 1 1, 1 2  Martin GALLOWAY, LSW       (071) Education Therapy Goals set - Continue working on invitations    Treatment Plan Problem(s): 1 1, 1 2  Education Therapy Time - 0900 - 0930 Previous goal was met  Readiness to Learning:  She is receptive to learning  There are  no barriers to learning    Learning Assessment Time - 1330 - 1400   Education completed on  illness and wellness tools  The teaching method was  verbal, written and demonstration  Shared area of learning: Yes  MASHA Ray MT-BC     (077) Allied Therapy 6227-1686 Kitty Underwood shared in [facility] group focused on relaxation techniques and DBT skill of Radical Acceptance  She engaged in therapist-led relaxation exercises and mamie analysis  Group explored breath counting and visualization  Group also explored ways to radically accept reality and let go  She was given hand-out on topic  Kitty Underwood shared that she needs to work on letting go of family issues  Some good progress towards goal observed and shared  Continue AT to further explore acceptance to promote recovery  MASHA Ray  Treatment Plan Problem(s): 1 1, 1 2  MOHAMUD Calabrese RN      Active Problems    1  Generalized anxiety disorder (300 02) (F41 1)   2  Major depressive disorder, recurrent severe without psychotic features (296 33) (F33 2)    Past Medical History    1  Denied: History of seizure   2  Denied: History of traumatic injury of head    Allergies    1  Percocet TABS    Current Meds   1  ARIPiprazole 2 MG Oral Tablet; TAKE 1 TABLET DAILY; Therapy: (Recorded:12Jan2018) to Recorded   2  DULoxetine HCl - 30 MG Oral Capsule Delayed Release Particles; TAKE 1 CAPSULE   Bedtime; Therapy: (Recorded:11Jan2018) to Recorded   3  DULoxetine HCl - 60 MG Oral Capsule Delayed Release Particles; TAKE 1 CAPSULE   Bedtime; Therapy: (Recorded:11Jan2018) to Recorded   4  LORazepam 0 5 MG Oral Tablet; Take 1 tablet twice daily; Therapy: (Recorded:11Jan2018) to Recorded   5  TraZODone HCl - 150 MG Oral Tablet; TAKE 1 TABLET Bedtime; Therapy: (Recorded:11Jan2018) to Recorded    Family Psych History  Father    1  Family history of depression (V17 0) (Z81 8)  Sister    2   Family history of paranoid schizophrenia (V17 0) (Z81 8)    Social History    · Current every day smoker (305 1) (F17 200)   · Daily caffeine consumption   · Employed   · Graduated from high school   ·     Future Appointments    Date/Time Provider Specialty Site   01/23/2018 11:00 AM Viviana Riddle M D   [facility] HOSPITALIZATION     Signatures   Electronically signed by : MOHAMUD Pierson; Jan 23 2018  9:30AM EST                       (Author)    Electronically signed by : Fidel Rodriguez RN; Jan 23 2018 11:34AM EST                       (Author)    Electronically signed by : SUHAS Ang; Jan 23 2018  2:23PM EST                       (Author)    Electronically signed by : SUHAS Gramajo; Jan 23 2018  2:30PM EST                       (Author)    Electronically signed by : MASHA Roblero; Jan 23 2018  3:21PM EST                       (Author)

## 2018-01-24 NOTE — PSYCH
Subjective:     Patient ID: Tim Luna is a 38 y o  female  [facility] Clinical Progress Notes     Specialized Services Documentation  Therapist must complete separate progress note for each specific clinical activity in which the individual participated during the day  Group Psychotherapy  [id number] Bjorn Carmichael participated in using healthy assertiveness skills to take care of oneself, including the use of deciding on and enacting healthy boundaries for everyday wellness, in work and in personal life  Bjorn Carmichael was attentive to handout identifying examples of poor and healthy boundaries and identifying what boundaries she would like to set with her supports from those closest to her to less intimate supports  Bjorn Carmichael made mild progress toward goals  Continue to offer group to offer both education and practice in developing  wellness strategies that are neither passive nor aggressive but assertive for self-care and recovery  Tx Plan Objective: 1 1,1 2,1 4, Therapist:  Lacy Rubio RN    Case Management Note    Current suicide risk : Low         Medications changes/added/denied? no    Treatment session number: 8    Individual Case Management Visit not provided today? 0200-1771   Yes, Bjorn Carmichael met with this CM to discuss arrangements for OP follow up appointments that she is working on setting up with her OP psychiatrist Dr Alfreda Woodall  Bjorn Carmichael is also in the process of contacting OP therapists that work with her new insurance (in network) to arrange OP therapist  Bjorn Van Lear denied SI and HI as well as any SE from medications  Discussed UR today is set for 3PM for concurrent review and this CM will advise Bjorn Carmichael of results of UR review after completion  Requesting extension of PHP LOS     Lacy Rubio RN

## 2018-01-24 NOTE — PSYCH
History of Present Illness  [facility] Clinical Progress Note [facility]:   Specialized Services Documentation - Therapist must complete separate progress note for each specific clinical activity in which the client participated during the day  (128) Group Psychotherapy: [phone] Rick Moyer participated in wellness group focused on identifying the costs and benefits of different coping strategies and behaviors  Educational handout was read and discussed  Rick Moyer identified isolating as her coping mechanism when she is feeling depressed or stressed  She described going out of the house more as a more positive way to cope and has started doing so gradually she stated since starting Program  Rick Moyer shared that she also cries when stressed and this has also been decreased Rick Moyer feels due to being able to talk about feelings as well as starting a new medication  Rick Moyer made moderate progress toward goals  Continue to offer group to provide education and support and practice identifying healthy coping strategies and behaviors to enhance coping with everyday and more severe stressors  Treatment Plan Problem(s): 1 1,1 2,1 3,1 4  Emelyn Robb RN     (212) Group Psychotherapy: ([phone]) Rick Moyer participated in psychotherapy group focused on positive supports and the importance of self-care  Rick Moyer identified family issues as a stressor today  She quietly engaged in group discussion, relating to peers that talked about the difficulty of finding supports that understand what they are going through  She also agreed that self-care is important to her well being, and noted that she tries to take "me time" in order to help care for herself  Mild progress noted toward goals  Continue psychotherapy group to encourage Rick Moyer to explore stressors and coping  Treatment Plan Problem(s): 1 1, 1 2, 1 4   Taina Purvis MSW, LSW       (619) Education Therapy Goals set - work on invitations for her daughter's wedding    Treatment Plan Problem(s): 1 1, 1 2  Education Therapy Time - 0900 - 0930 Previous goal was met  Readiness to Learning:  She is receptive to learning  There are  no barriers to learning  Learning Assessment Time - 1330 - 1400   Education completed on  illness and wellness tools  The teaching method was  verbal  Shared area of learning: Yes  MOHAMUD Gillespie     (312) Allied Therapy 7095-6042 Maicol Ledbetter actively shared in [facility] group focused on the [facility] skills of mindfulness  She engaged in mindfulness tasks and discussion  Group discussed how to do things non-judgmentally, one-mindfully, and effectively  Group explored diaphragmatic breathing and progressive muscle relaxation  She was given hand-outs on topic  Maicol Ledbetter shared that she want to work on being able to identify if a thought is helping her be effective, and let it go if it is being judgmental instead  Some good progress towards goal observed and shared  Continue AT to further practice mindfulness to promote wellness  MASHA Kidd  Treatment Plan Problem(s): 1 1  MOHAMUD Gillespie       Case Management Note:   [id number] Maicol Ledbetter met with this CM/RN to review and sign updated treatment plan and she received a copy  Maicol Ledbetter discussed with this RN labs that were drawn Friday and results of same  Dr Mariam Angeles requested that this RN discuss with Maicol Ledbetter making an appointment ASAP with her PCP to discuss the results of her labs and her very high triglyceride level, in particular  This RN educated Maicol Ledbetter on dangers of very high triglycerides and gave her a handout on lipids explaining need to decrease her present triglyceride levels and why this is important for good health  In addition, Stevie Morejon is a cigarette smoker that puts her at further risk for cardiac disease  She stated that she understood this and also believes cardiac illness may run in her family as her mother has similar issues   She will make an appointment with Dr Elouise Baumgarten and bring the lab results that this RN printed for her with her  Rivera Masters stated that she has not been to see her PCP in "a while " Denied any SE from medications as well as denied SI and HI    TREATMENT SESSION NUMBER: 6   Current suicide risk is low  Medications not changed/added/denied  Santos Denson RN      Active Problems    1  Generalized anxiety disorder (300 02) (F41 1)   2  Major depressive disorder, recurrent severe without psychotic features (296 33) (F33 2)    Past Medical History    1  Denied: History of seizure   2  Denied: History of traumatic injury of head    Allergies    1  Percocet TABS    Current Meds   1  ARIPiprazole 2 MG Oral Tablet; TAKE 1 TABLET DAILY; Therapy: (Recorded:12Jan2018) to Recorded   2  DULoxetine HCl - 30 MG Oral Capsule Delayed Release Particles; TAKE 1 CAPSULE   Bedtime; Therapy: (Recorded:11Jan2018) to Recorded   3  DULoxetine HCl - 60 MG Oral Capsule Delayed Release Particles; TAKE 1 CAPSULE   Bedtime; Therapy: (Recorded:11Jan2018) to Recorded   4  LORazepam 0 5 MG Oral Tablet; Take 1 tablet twice daily; Therapy: (Recorded:11Jan2018) to Recorded   5  TraZODone HCl - 150 MG Oral Tablet; TAKE 1 TABLET Bedtime; Therapy: (Recorded:11Jan2018) to Recorded    Family Psych History  Father    1  Family history of depression (V17 0) (Z81 8)  Sister    2  Family history of paranoid schizophrenia (V17 0) (Z81 8)    Social History    · Current every day smoker (305 1) (F17 200)   · Daily caffeine consumption   · Employed   · Graduated from high school   ·     Future Appointments    Date/Time Provider Specialty Site   01/23/2018 11:00 AM Mario Riddle M D   Psychiatry [facility] PARTIAL HOSPITALIZATION     Signatures   Electronically signed by : SUHAS Langston; Jan 22 2018 11:59AM EST                       (Author)    Electronically signed by : MASHA Cadena; Jan 22 2018  2:10PM EST                       (Author)    Electronically signed by : Qamar Mena EDILIA WilsonBC; Jan 22 2018  2:15PM EST                       (Author)    Electronically signed by : Santos Denson RN; Jan 22 2018  2:38PM EST                       (Author)    Electronically signed by : Santos Denson RN; Jan 22 2018  4:17PM EST                       (Author)

## 2018-01-24 NOTE — PSYCH
Subjective:     Patient ID: Pam Robles is a 38 y o  female  [facility] Clinical Progress Notes     Specialized Services Documentation  Therapist must complete separate progress note for each specific clinical activity in which the individual participated during the day  Other/Case Management Note  1500 VM with clinical information left for Cyn Kraus at [place] [address] [phone], per reviewer's request to complete UR today on LCD of PHP - day 8  Additional PHP/IOP days will be requested  Waiting for return call       Cecile Solano RN

## 2018-01-24 NOTE — PSYCH
Subjective:     Patient ID: Bettina Meyer is a 38 y o  female  [facility] Clinical Progress Notes     Specialized Services Documentation  Therapist must complete separate progress note for each specific clinical activity in which the individual participated during the day  Education Therapy   Time:  1590-7108  Previous goal met: No  Readiness to Learning: Receptive  Barriers to Leaning: None  Learning Assessment  Time: 0620-5091  Education Completed: Illness and Wellness Tools  Teaching Method: Verbal  Shared Area of Learning: Yes   Goal Set: make OP follow up calls     Treatment Objective: 1 4  MOHAMUD Coello

## 2018-01-25 ENCOUNTER — DOCUMENTATION (OUTPATIENT)
Dept: PSYCHIATRY | Facility: CLINIC | Age: 43
End: 2018-01-25

## 2018-01-25 NOTE — PSYCH
1/25/18  0900  Message left on VM from Kelin Landing at [facility] CTR reviewer that ten IOP days have been authorized for Scooter Taveras beginning 1/25-2/22/18 [id number]  Since Aubrey Garcia is absent today from [facility] PHP first IOP day will begin tomorrow, 1/26/18  [phone] IOP UR line

## 2018-01-25 NOTE — PSYCH
1/25/18  0900 Marleni called out today due to migraine headache  She will return to Program tomorrow

## 2018-01-26 ENCOUNTER — OFFICE VISIT (OUTPATIENT)
Dept: PSYCHOLOGY | Facility: CLINIC | Age: 43
End: 2018-01-26
Payer: COMMERCIAL

## 2018-01-26 DIAGNOSIS — F41.1 GENERALIZED ANXIETY DISORDER: ICD-10-CM

## 2018-01-26 DIAGNOSIS — F33.2 MAJOR DEPRESSIVE DISORDER, RECURRENT SEVERE WITHOUT PSYCHOTIC FEATURES (HCC): Primary | ICD-10-CM

## 2018-01-26 PROCEDURE — S9480 INTENSIVE OUTPATIENT PSYCHIA: HCPCS

## 2018-01-26 PROCEDURE — 99999 PR OFFICE/OUTPT VISIT,PROCEDURE ONLY: CPT | Performed by: PSYCHIATRY & NEUROLOGY

## 2018-01-26 NOTE — PSYCH
Subjective:     Patient ID: Jeniffer Hughes is a 38 y o  female  [facility] Clinical Progress Notes      Specialized Services Documentation  Therapist must complete separate progress note for each specific clinical activity in which the individual participated during the day       Education Therapy   Time:  4607-8217  Previous goal met: Yes   Readiness to Learning: Receptive  Barriers to Leaning: None  Learning Assessment  Time: 4253-6670  Education Completed: Illness  Teaching Method: Verbal  Shared Area of Learning: Yes   Goal Set: stay active and busy - hold family gathering  Tx Plan Objective: 1 2, Therapist:  Conchita Lopez [facility]

## 2018-01-26 NOTE — PSYCH
Subjective:     Patient ID: Lauro Paul is a 38 y o  female  [facility] Clinical Progress Notes      Specialized Services Documentation  Therapist must complete separate progress note for each specific clinical activity in which the individual participated during the day  Group Psychotherapy [phone]  Treatment Plan Objectives 1 1,1 2  Harrison Casey participated in weekly wellness group focused on completing self-assessment tool to assist in identifying specific areas of wellness/functioning requiring more intensive goal work including these areas: physical, emotional, cognitive, social, vocational and Ethelle Samples shared she was going to focus on vocational as she found out yesterday that she lost her job  She was receptive to peer and this facilitators input to also include working on emotional loss aspects of losing one's job  Althea Hidalgo made good progress toward goals  Continue to offer group to educate on importance of identifying and planning specific  goals to concentrate working on both in/out of [facility] to enhance self-efficacy and increase wellness  Therapist:  Rosa Elena Foss RN                Other  Althea Hidalgo was informed of today being IOP day one  She will attend [facility] next week Mon, Wed and Fri  Case Management Note  [id number]  Althea Hidalgo met with this CM to discuss progress in Program and D/C planning  She informed this CM that she had lost her job but was coping with same functionally  This weekend has plans not to isolate and to take daughter out to dinner for her birthday  Also discussed she has made OP appointment with Dr Carmella Garza, her OP psychiatrist for 2/8/18 at 1:30PM and that she is in the process of acquiring OP therapist at Dr Laurie Bourgeois office and will inform of appointment when it is made  No Si, no Hi, no SE from medications reported     Rosa Elena Foss RN    Current suicide risk : Low         Medications changes/added/denied? unchanged    Treatment session number: 9  IOP Day 1    Individual Case Management Visit provided today?  Yes

## 2018-01-26 NOTE — PSYCH
Subjective:     Patient ID: Gweneth Davina is a 38 y o  female  [facility] Clinical Progress Notes      Specialized Services Documentation  Therapist must complete separate progress note for each specific clinical activity in which the individual participated during the day  Group Psychotherapy   [phone]) Kitty Underwood participated in psychotherapy group focused on taking action to make positive changes in one's life  Kitty Underwood identified having to find a new job as a stressor today  She moderately engaged in group discussion, talking about the dissatisfaction she feels in her current job, and thinking about changing careers entirely  Group discussed the importance of recognizing personal responsibility in making positive changes in order for life to feel more satisfying  Some moderate progress noted toward goals  Continue psychotherapy group to encourage Kitty Underwood to explore stressors and coping     Tx Plan Objective: 1 1, 1 2, Therapist:  Heavenly GALLOWAY

## 2018-01-26 NOTE — PSYCH
Subjective:     Patient ID: Jeniffer Hughes is a 38 y o  female  [facility] Clinical Progress Notes      Specialized Services Documentation  Therapist must complete separate progress note for each specific clinical activity in which the individual participated during the day  Allied Therapy [id number] Donna Pollard actively shared in [facility] group focused on DBT skill Accumulating Positive Emotions  She engaged in egg shaker mindful task, mamie analysis, and chime playing  Group explored ways to build and be mindful of positive emotions/experiences  Group was introduced to ABC PLEASE for emotional vulnerability  Group discussed doing pleasant events during weekend to avoid unproductiveness  She was given hand-outs on topic, including pleasant events list  Donna Latrice shared spending time with family as a pleasant event she will engage in this weekend  Some good progress towards goal observed and shared  Continue AT to further encourage accumulating the positives to increase regulation of emotions   MASHA Kelley Tx Plan Objective: 1 1, 1 2 Therapist:  Conchita Lopez MT-BC

## 2018-01-26 NOTE — PSYCH
Subjective:     Patient ID: Kelsea Kraus is a 38 y o  female  [facility] Clinical Progress Notes      Specialized Services Documentation  Therapist must complete separate progress note for each specific clinical activity in which the individual participated during the day         [facility] follow up physician's orders:  Start [facility] [address] [facility] MD Venkatesh

## 2018-01-29 ENCOUNTER — OFFICE VISIT (OUTPATIENT)
Dept: PSYCHOLOGY | Facility: CLINIC | Age: 43
End: 2018-01-29
Payer: COMMERCIAL

## 2018-01-29 DIAGNOSIS — F41.1 GENERALIZED ANXIETY DISORDER: ICD-10-CM

## 2018-01-29 DIAGNOSIS — F33.2 MAJOR DEPRESSIVE DISORDER, RECURRENT SEVERE WITHOUT PSYCHOTIC FEATURES (HCC): Primary | ICD-10-CM

## 2018-01-29 PROCEDURE — 99999 PR OFFICE/OUTPT VISIT,PROCEDURE ONLY: CPT | Performed by: PSYCHIATRY & NEUROLOGY

## 2018-01-29 PROCEDURE — S9480 INTENSIVE OUTPATIENT PSYCHIA: HCPCS

## 2018-01-29 NOTE — PSYCH
Subjective:     Patient ID: Miky Koroma is a 38 y o  female  [facility] Clinical Progress Notes      Specialized Services Documentation  Therapist must complete separate progress note for each specific clinical activity in which the individual participated during the day  Group Psychotherapy [phone] Tx Plan Objective: 1:1,1:2 Therapist:  Krystal Medina RN     [id number] Annabel Holloway participated in [address] [place] [address] focusing on improving emotional health and utilizing positive emotions  Handout was shared listing several strategies to encourage positive thinking  Annabel Holloway actively participated and shared five things that elicited positive emotions to assist her in recovery  The majority of her answers related to positive emotions revolving around  family and being proud of her children  Annabel Holloway has shown progress in goals 1:1, 1:2  Continue group to educate on ways to develop, and practice strategies to strengthen emotional resiliency in recovery  Case Management Note    Annabel Holloway shared that she obtained a new job in the [employer] industry over the weekend after calling a previous employer  She had previously worked at this [employer] in the past and left on good terms  She expressed being a bit nervous but grateful and happy  She will be working part time three days a week and feels 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 stable    Treatment session number: Day 10 IOP 2    Individual Case Management Visit provided today?  Yes

## 2018-01-29 NOTE — PSYCH
Subjective:     Patient ID: Aretha River is a 38 y o  female  [facility] Clinical Progress Notes      Specialized Services Documentation  Therapist must complete separate progress note for each specific clinical activity in which the individual participated during the day  Group Psychotherapy Tee Last participated in psychotherapy group that focused on taking small, manageable steps towards long term life changes  The group engaged in an exercise that addressed and explored personal emotions related to making changes, and were provided with a handout on decisional balance that discussed the costs and benefits of making choices  Tee Last identified nervousness over starting a new job as a stressor for today  Tee Last did not engage in group discussion, but remained attentive to peers  No outward progress noted towards goals  Continue psychotherapy group to encourage Tee Salgadoaure to explore stressors and find additional ways of coping   LAVERNE Iyer Intern     Tx Plan Objective: 1 1, 1 2, Therapist:  Daya GALLOWAY

## 2018-01-29 NOTE — PSYCH
Subjective:     Patient ID: Arvind Flores is a 38 y o  female  [facility] Clinical Progress Notes      Specialized Services Documentation  Therapist must complete separate progress note for each specific clinical activity in which the individual participated during the day  Allied Therapy   [phone] Abdifatah Lakhani actively shared in [facility] group focused on mindfulness  She engaged in [address], object meditation and discussion  She described benefits of PMR exercise  She was also able to reference back to previous groups and share learning  Group explored overall goals of mindfulness and benefits to skill practice  Some progress toward goal noted  Continue AT to encourage healthy skill development and practice of explored strategies      Tx Plan Objective: 1 1, Therapist:  Kriss MALLORY    Education Therapy   Time:  4703-9897    Previous goal met: Yes   Readiness to Learning: Receptive  Barriers to Leaning: None  Learning Assessment  Time: 8483-8647    Education Completed: Illness and Wellness Tools  Teaching Method: Verbal and Demonstration  Shared Area of Learning: Yes   Goal Set: self-care - "do my nails"  Tx Plan Objective: 1 1, Therapist:  Kriss MALLORY

## 2018-01-31 ENCOUNTER — OFFICE VISIT (OUTPATIENT)
Dept: PSYCHOLOGY | Facility: CLINIC | Age: 43
End: 2018-01-31
Payer: COMMERCIAL

## 2018-01-31 DIAGNOSIS — F33.2 MAJOR DEPRESSIVE DISORDER, RECURRENT SEVERE WITHOUT PSYCHOTIC FEATURES (HCC): Primary | ICD-10-CM

## 2018-01-31 DIAGNOSIS — F41.1 GENERALIZED ANXIETY DISORDER: ICD-10-CM

## 2018-01-31 PROCEDURE — S9480 INTENSIVE OUTPATIENT PSYCHIA: HCPCS

## 2018-01-31 PROCEDURE — 99999 PR OFFICE/OUTPT VISIT,PROCEDURE ONLY: CPT | Performed by: PSYCHIATRY & NEUROLOGY

## 2018-01-31 NOTE — PSYCH
Subjective:     Patient ID: Pa Carr is a 38 y o  female  [facility] Clinical Progress Notes      Specialized Services Documentation  Therapist must complete separate progress note for each specific clinical activity in which the individual participated during the day  Group Psychotherapy [phone] Rick Moyer actively shared in psychotherapy group focused on WRAP development  She engaged in mamie analysis, instrument improvisation, and discussion  Group discussed how not understanding emotions causes someone to be less likely to reach out for support, leading to feeling misunderstood  Group explored sections of WRAP on feeling well, daily maintenance plan, warning signs, action plan, and supports  Rick Moyer shared when she is unwell she isolates and does not practice self-care, and her action plan can be putting aside the time and reaching out to supports  Good progress towards goal shared  Continue psychotherapy to further encourage WRAP utilization  Tx Plan Objective: 1 1, 1 2 Therapist:  Andie Guzman MT    Allied Therapy [phone] Rick Moyer actively shared in allied therapy group focused on decreasing self-stigmas and support  She attentively listened to [address] [facility] share his life story  Group encouraged power of learning about self, accepting illness, and personal responsibility in recovery  Community resources reviewed  Rick Moyer shared that she learned about self-love  Good progress towards goal shared  Continue AT to encourage self-awareness and healthy engagement of support   Tx Plan Objective: 1 1, 1 2 Therapist:  Andie Guzman MT    Education Therapy   Time:  4846-5347  Present  Previous goal met: No  Readiness to Learning: Receptive  Barriers to Leaning: None  Learning Assessment  Time: 3359-2288  Present  Education Completed: Illness and Wellness Tools  Teaching Method: Verbal, Written and Demonstration  Shared Area of Learning: Yes   Goal Set: Stay calm  Tx Plan Objective: 1 1, 1 2 Therapist: Luke Guerrero MT

## 2018-01-31 NOTE — PSYCH
RIANNA BELL     [facility]     [facility] Plan   AREAS OF NEEDS: Severe depression and anxiety as manifested by suicidal thoughts without a plan, sleep disturbances, poor concentration, isolation and spending most of time in bed past three months, crying often, feeling hopeless and helpless and getting no kristian in life  Date Initiated: 1/11/18      LONG TERM GOAL: 1 0 I will identify three signs that I am more in control of my mood, less depressed and anxious and more productive in my daily life  Date Initiated: 1/11/18   Target Date: 2/8/18    COMPLETION DATE 2/12/18     Date Initiated: 1/11/18    1 1 I will identify three things I need to do on a daily basis to take care of myself  Target Date: 1/22/18    COMPLETION DATE 1/31/18    Date Initiated: 1/11/18    1 2 I will name two things I can do to decrease my isolation and increase healthy socialization and support  Target Date: 1/22/18    COMPLETION DATE 1/31/18    Date Initiated: 1/11/18    1 3 I will take Abilify as ordered and I will bring any questions or concerns I have regarding my medications to CM/Program Psychiatrist when or if they occur  Target Date: 1/22/18    REVISION DATE : 1/31/18  COMPLETION DATE 2/12/18    Date Initiated: 1/11/18    1 4 I will identify four supports and state how each of my supports will help me in my recovery  Target Date: 1/22/18         REVISION DATE : 1/31/18  COMPLETION DATE 2/12/18     7 DAY REVISION: 1 1,1 2,1 3,1 4 Continue goals as they remain relevant but unmet , 1 5 I will name two ways I can increase socialization and support and decrease isolation  Date Initiated: 1/22/18    Target Date: 1/31/18    REVISION DATE : 1/31/18  COMPLETION DATE 2/12/18     Date Initiated: 1/31/18    1 6 I will identify 3 skills I am learning at [facility] that I am putting into my life to prevent relapse and support my wellness ongoing      Target Date: 2/19/18    COMPLETION DATE 2/12/18    PSYCHIATRY: Medication management and education  Continue medication management and education  Date Initiated: 2018   Revision Date: 2018   REVISION DATE : 18   Continue Medication Management and Education  The person(s) responsible for carrying out the plan is Rehana Gonzalez MD      NURSIN 1,1 2,1 3,1 4 This RN will provide daily wellness group five days weekly to educate Oscar El on S/S of her diagnoses and medications used in treatment  1 1,1 2,1 3,1 4,1 5 This RN will continue to provide daily wellness group to educate Oscar El on her diagnoses and medications  Date Initiated: 18    Revision Date: 18   REVISION DATE : 18 1 5, 1 6 Continue wellness groups to education Oscar El regarding coping strategies, medications and relapse prevention    The person(s) responsible for carrying out the plan is Tana Michael, RN and Scooter Du RN    PSYCHOLOGY: 1 1, 1 2, 1 4 Provide psychotherapy group 5 times per week to allow opportunity for Oscar El to explore stressors and ways of coping  1 1, 1 2, 1 4 continue to provide psychotherapy group each program day to encourage Oscar El to further explore stressors and healthier ways of coping     Date Initiated: 2018   Revision Date: 2018   REVISION DATE : 18  1 5,1 6 Continue to provide psychotherapy groups 3 times a  Week to encourage Marleni to share stressors and coping options  The person(s) responsible for carrying out the plan is LAVERNE Bradshaw LSW  ALLIED THERAPY: 1 1, 1 2 Provide Marleni AT group 5 times weekly to aid in understanding and use of wellness tools through engagement and discussion of meaningful tasks  MASHA Prajapati   1 1, 1 2 Continue to provide Marleni AT group 5 times weekly to aid in understand and use of wellness tools, and transfer of skills to home, to promote recovery through meaningful tasks   MASHA Prajapati    Date Initiated: 18   Revision Date: 18 REVISION DATE : 1/31/18 1 5,1 6 1 1,1 2,1 5 Continue to engage Bettina Meyer to participate in AT group 3-4 times weekly to practice wellness tools within program and transfer to home sharing successes and barriers through healthy task involvement  The person(s) responsible for carrying out the plan is MOHAMUD Coello          CASE MANAGEMENT: 1 0 This CM will meet regularly with Martha Paul to assess progress in Program, provide assistance with D/C planning, UR, and supports building  Date Initiated: 1/11/18   Revision Date: 1/22/18  REVISION DATE : 1/31/18   The person(s) responsible for carrying out the plan is Shaka Castle RN and Dewey Hunter RN     DISCHARGE CRITERIA: I will identify three signs of improvement and complete my relapse prevention plan  DISCHARGE PLAN: Martha Jimenezzier will arrange outpatient therapist once information obtained from her insurance company and she will return to Dr Leesa Sanchez at his office in [place], [place] after D/C     Estimated Length of Stay: 10 days   Strengths: Responsible, motivated to increase level of functioning and decrease depression and anxiety   Limitations: Financial issues due to not working, feeling guilty about being depressed (for her children/family) "putting them through this"   Diagnosis and Treatment Plan explained to patient, patient relates understanding diagnosis and is agreeable to Treatment Plan         CLIENT COMMENTS / Please share your thoughts, feelings, need and/or experiences regarding your treatment plan: _____________________________________________________________________________________________________________________________________________________________________________________________________________________________________________________________________________________________________________________ Date/Time: ______________        Patient Signature: _________________________________ Date/Time: ______________      Signatures  Electronically signed by : KAR Phillips ; Jan 11 2018  8:58AM EST                       (Author)  Electronically signed by : SUHAS Talavera; Jan 11 2018  9:09AM EST                       (Author)  Electronically signed by : Cedric Madsen MTCURTIS; Jan 11 2018 10:04AM EST                       (Author)  Electronically signed by : Joan Allen [facility]; Jan 11 2018 10:27AM EST                       (Author)  Electronically signed by : Cholo Rudd RN; Jan 11 2018  4:09PM EST                       (Author)  Electronically signed by : Cholo Rudd RN; Raymond 15 2018 12:32PM EST                       (Author)  Electronically signed by : Cedric Madsen [facility]; Jan 22 2018  2:13PM EST                       (Author)  Electronically signed by : Joan Allen [facility]; Jan 22 2018  2:14PM EST                       (Author)  Electronically signed by : SUHAS Talavera; Jan 22 2018  2:23PM EST                       (Author)  Electronically signed by : Cholo Rudd RN; Jan 22 2018  2:57PM EST                       (Author)  Electronically signed by : Cholo Rudd RN; Jan 22 2018  2:57PM EST                       (Author)  Electronically signed by : KAR Phillips ; Jan 22 2018  4:30PM EST                       (Author)      Electronically signed by Joan Allen at 1/24/2018  4:16 PM

## 2018-01-31 NOTE — PSYCH
Subjective:     Patient ID: Santy Falk is a 38 y o  female  [facility] Clinical Progress Notes      Specialized Services Documentation  Therapist must complete separate progress note for each specific clinical activity in which the individual participated during the day  Group Psychotherapy [id number] Tx Plan Objective: 1:1, Therapist:  Kevin Barger participated in wellness group focused on identifying present coping strategies when depressed, anxious, or stressed and learning to substitute with healthier strategies  Maicol Ledbetter participated and shared during this wellness group  Rozina Juarez made progress toward goal 1:1, 1:2, 1:3 Continue to offer group education regarding the use of healthy coping skills  In addition, provided information on journaling and positive affirmations  Journal and blank index cards provided during group for individual journaling  Case Management Note     met with Maicol Ledbetter and reviewed treatment plan update  Maicol Ledbetter has started her orientation at her new job and reports first day has gone well  She is scheduled for orientation tonight and is looking forward to learning the new computer system at the [facility]  Marleni's affect was bright during meeting  The only concern voiced was that she felt she had gained some weight  Current weight 175 and it was discussed to weigh self again on Monday 2/5 at same time in the afternoon  Maicol Ledbetter also reported that she felt bloated today from having her period  Maicol Ledbetter reported increase hunger and questioned if it could be medication related  Strategies regarding filling and low calorie foods discussed  Maicol Ledbetter reported she has been having "healthy snacks like nuts" it was reviewed that even though nuts are a healthy snack they are high in calories and portion control is important       Jerold Dandy RN    Current suicide risk : Low         Medications changes/added/denied? stable    Treatment session number: Day 6 IOP 3    Individual Case Management Visit provided today?  Yes

## 2018-02-02 ENCOUNTER — DOCUMENTATION (OUTPATIENT)
Dept: PSYCHIATRY | Facility: CLINIC | Age: 43
End: 2018-02-02

## 2018-02-02 NOTE — PROGRESS NOTES
Subjective:     Patient ID: Tiana Garcia is a 38 y o  female  [facility] Clinical Progress Notes      Specialized Services Documentation  Therapist must complete separate progress note for each specific clinical activity in which the individual participated during the day  Case Management Note Jose Bolaños excused from IOP programming due to inclement weather  This writer did leave message to check in with Jose Miky and no return call as of this time  Justine Gauthier RN    Current suicide risk : n/a    Medications changes/added/denied? n/a    Treatment session number: n/a    Individual Case Management Visit provided today?  No    [facility] follow up physician's orders: n/a

## 2018-02-05 ENCOUNTER — OFFICE VISIT (OUTPATIENT)
Dept: PSYCHOLOGY | Facility: CLINIC | Age: 43
End: 2018-02-05
Payer: COMMERCIAL

## 2018-02-05 DIAGNOSIS — F33.2 MAJOR DEPRESSIVE DISORDER, RECURRENT SEVERE WITHOUT PSYCHOTIC FEATURES (HCC): Primary | ICD-10-CM

## 2018-02-05 DIAGNOSIS — F41.1 GENERALIZED ANXIETY DISORDER: ICD-10-CM

## 2018-02-05 PROCEDURE — 99999 PR OFFICE/OUTPT VISIT,PROCEDURE ONLY: CPT | Performed by: PSYCHIATRY & NEUROLOGY

## 2018-02-05 PROCEDURE — S9480 INTENSIVE OUTPATIENT PSYCHIA: HCPCS

## 2018-02-05 NOTE — PSYCH
Subjective:     Patient ID: Gabrielle Hermosillo is a 38 y o  female  [facility] Clinical Progress Notes      Specialized Services Documentation  Therapist must complete separate progress note for each specific clinical activity in which the individual participated during the day  Group Psychotherapy   ([phone]) Sindy Kumar participated in psychotherapy group focused on expressing emotions in healthy ways  Sindy Kumar identified trying to balance her new work schedule and sleep as a stressor today  She quietly engaged in group discussion, agreeing with peers who talked about often bottling their emotions and letting things build to the point of coping in negative ways  Although she did not share her personal experiences, she expressed relating to peers on this topic  Group discussed the importance of allowing oneself to feel emotions so that they do not lead to depression, anxiety and the use of unhealthy coping mechanisms  Some mild progress noted toward goals  Continue psychotherapy group to encourage Sindy Kumar to explore stressors and coping   Tx Plan Objective: 1 1, 1 2, Therapist:  Cedric GALLOWAY

## 2018-02-05 NOTE — PSYCH
Subjective:     Patient ID: Alma Eaton is a 38 y o  female  [facility] Clinical Progress Notes        Group Psychotherapy 10:   1 1,1 2 Tx Plan Objective: , Therapist:  Fe Ghoshyaw Denney participated in Wellness group focused on weight gain or loss related to medications and other factors such as not recognizing the difference between hunger and cravings  Rod Aj was attentive to education and handouts shared explaining role of serotonin on mood and possibly appetite  Fauziamark Denney did not share in peer discussion regarding weight gain/loss  She made slow progress toward goals  Continue to provide group to educate on adopting healthy nutritional regimes when on or off medications and in recovery to improve overall health and well-being  Case Management Note  [id number] Elicia Denney met with this CM to review progress in Program  She stated that she is feeling good 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 unchanged    Treatment session number: 4    Individual Case Management Visit provided today?  Yes     [facility] follow up physician's orders: N/A

## 2018-02-05 NOTE — PSYCH
Subjective:     Patient ID: Aretha River is a 38 y o  female  [facility] Clinical Progress Notes      Specialized Services Documentation  Therapist must complete separate progress note for each specific clinical activity in which the individual participated during the day  Allied Therapy [id number] Tee knowles program before group   Therapist:  Reinaldo Bee MT    Education Therapy   Time:  2345-7606  Present  Previous goal met: Yes   Readiness to Learning: Receptive  Barriers to Leaning: None  Learning Assessment  Education Completed: Illness and Wellness Tools  Teaching Method: Verbal and Written  Tee knowles program before group started  Tx Plan Objective: 1 6 Therapist:  Reinaldo Bee MT

## 2018-02-05 NOTE — PSYCH
[facility] Clinical Progress Notes      Specialized Services Documentation  Therapist must complete separate progress note for each specific clinical activity in which the individual participated during the day         [facility] follow up physician's orders:   DATE: 2/5/2018  TIME: 8:16 am   [facility] TODAY  Sofy Hart MD

## 2018-02-06 ENCOUNTER — DOCUMENTATION (OUTPATIENT)
Dept: PSYCHIATRY | Facility: CLINIC | Age: 43
End: 2018-02-06

## 2018-02-06 NOTE — PROGRESS NOTES
0900 Althea Hidalgo is excused from [facility] today due to IOP day off     1400 This CM spoke with Althea Hidalgo re: her early departure from Program yesterday  She stated that she was not feeling well and had an upset stomach and needed to leave  Althea Sheikhisaura stated that she thought it may have been something she ate but was not sure  She will contact her PCP today if she continues not to feel well   Althea Hidalgo

## 2018-02-07 ENCOUNTER — DOCUMENTATION (OUTPATIENT)
Dept: PSYCHIATRY | Facility: CLINIC | Age: 43
End: 2018-02-07

## 2018-02-08 ENCOUNTER — DOCUMENTATION (OUTPATIENT)
Dept: PSYCHIATRY | Facility: CLINIC | Age: 43
End: 2018-02-08

## 2018-02-09 ENCOUNTER — OFFICE VISIT (OUTPATIENT)
Dept: PSYCHOLOGY | Facility: CLINIC | Age: 43
End: 2018-02-09
Payer: COMMERCIAL

## 2018-02-09 DIAGNOSIS — F33.2 MAJOR DEPRESSIVE DISORDER, RECURRENT SEVERE WITHOUT PSYCHOTIC FEATURES (HCC): Primary | ICD-10-CM

## 2018-02-09 DIAGNOSIS — F41.1 GENERALIZED ANXIETY DISORDER: ICD-10-CM

## 2018-02-09 PROCEDURE — S9480 INTENSIVE OUTPATIENT PSYCHIA: HCPCS

## 2018-02-09 NOTE — PSYCH
Subjective:     Patient ID: Yaz Cerda is a 38 y o  female  [facility] Clinical Progress Notes      Specialized Services Documentation  Therapist must complete separate progress note for each specific clinical activity in which the individual participated during the day  Group Psychotherapy   ([phone]) Corwin Vega participated in psychotherapy group focused on avoiding self-sabotage and coping ahead to support healthier reactions to stressors  Corwin Vega identified having her kids home this weekend for her daughter's birthday as a stressor due to busyness and lack of alone time  She actively engaged in group discussion, talking about how she used to self-sabotage by not communicating assertively, but that she has noticed a recent improvement in being able to be more assertive  She provided good feedback to peers about ways to prioritize self-care and healthy boundaries, and expressed hope for continued improvement  Good progress noted toward goals today  Continue psychotherapy group to encourage Corwin Vega to explore stressors and coping    Tx Plan Objective: 1 1, 1 2, Therapist:  Casi Owusu MSW

## 2018-02-09 NOTE — PSYCH
Subjective:     Patient ID: Gisel Brothers is a 38 y o  female  [facility] Clinical Progress Notes      Specialized Services Documentation  Therapist must complete separate progress note for each specific clinical activity in which the individual participated during the day  Group Psychotherapy [id number]  Amber Barajas participated in weekly wellness group to discuss what particular area of wellness t has been worked on up to today in Program and choice of area to continue working on for recovery as targeted in treatment plan, by choice, or in [facility] [employer]  Amber Barajas shared that she is going to continue working on "emotional" area of wellness  She feels more stable emotionally and related a decrease in crying spontaneously, as she was doing, when she was admitted  She feels medication is helping and also decreased stress from now working part-time at a new job  Amber Barajas made good progress toward goal  Continue to offer weekly wellness group to focus on goals and identify areas of goal achievement and need  1 1,1 2  Darnell Cantrell, RN Therapist              Case Management Note  [id number] Amber Barajas met with this CM to discuss discharge  She stated that she feels she is doing better and saw her OP psychiatrist, Dr William Cortes yesterday who agreed that she had made progress in [facility]  Versa Jenn stated that she is working part-time at her new job and feels three days weekly is sufficient and what she can handle for now  She likes her job especially as it is less stressful than her prior position  Amber Jenn stated that she does not have a car now and transportation to/from Program is difficult for her to arrange  She would like discharge on Monday, 2/19/18 for this reason but more importantly she does feel she has met her therapeutic goals and can see improvement to her mood and level of functioning  Amber Barajas was given relapse prevention plan to complete and bring to Program on Monday   She will continue with stabilizing emotions and not isolating  Nella Talavera reported crying has stopped and sleep improved  She has arranged to see a therapist OP in Dr Chelsea Michel office after D/C but has not been given an appointment date/time as yet  Nella Talavera denied SI and Hi  She denied SE from medications  Kaylee Gomez RN    Current suicide risk : Low       Medications changes/added/denied? No    Treatment session number: IOP Day 4    Individual Case Management Visit provided today?  Yes     [facility] follow up physician's orders: n/a

## 2018-02-09 NOTE — PSYCH
Subjective:     Patient ID: Gweneth Davina is a 38 y o  female  [facility] Clinical Progress Notes      Specialized Services Documentation  Therapist must complete separate progress note for each specific clinical activity in which the individual participated during the day  Allied Therapy [phone] Kitty Underwood actively shared in [facility] group focused on [address] [place] [address]  She engaged in instrument improvisation, mamie analysis, and assertive conversation role-play  Group discussed DEAR MAN to get an objective met and role-played ways to have that assertive conversation  Group also discussed talking tips and need to practice the skill to become more comfortable and effective at using it  Kitty Underwood shared that being assertive and trying to use Krunal Mussel is really difficult for her and she needs to practice it  Some progress towards goal observed  Continue AT to further practice being assertive to promote recovery   Tx Plan Objective: 1 5, 1 6 Therapist:  Eros Mario MT

## 2018-02-09 NOTE — PSYCH
Subjective:     Patient ID: Janay Amaro is a 38 y o  female  [facility] Clinical Progress Notes      Specialized Services Documentation  Therapist must complete separate progress note for each specific clinical activity in which the individual participated during the day       Education Therapy   Time:  7097-2723  Previous goal met: Yes   Readiness to Learning: Receptive  Barriers to Learning: None  Learning Assessment  Time: 4752-6757  Education Completed: Illness, Aftercare and Wellness Tools  Teaching Method: Verbal  Shared Area of Learning: Yes   Goal Set: use coping skills over a busy upcoming weekend  Tx Plan Objective: 1 5, Therapist:  Lukas Love MT-BC

## 2018-02-09 NOTE — PSYCH
[facility] Discharge Instructions:     Disposition: home     Address: [address] [place] [address]  [id number]    Diagnosis: Major depressive disorder, recurrent severe without psychotic features, Generalized anxiety disorder    Allergies (Drug/Food): Allergies not on fileSulfa antibiotics, Oxycodone-acetaminophen, Pentosan Polysulfate    Activity:Normal activity     Diet:no recommendations     Smoking Cessation:not a smoker      Vaccines: If you received a vaccine, please notify your family physician on your next visit  For more information, please call [phone]  Follow-up appointments/Referrals: Will follow up with Dr Curtis Arreola OP psychiatrist - last appointment was 2/8/18 and OP therapist appointment will be made with OP therapist at Dr Jaime Shanks office by Stephanie Knox after [facility] D/C  ICM/CTT Depression/DBSA Support Group Information given and explained to Stephanie Knox on Discharge      Intake/Referral/Evaluation (Non-Emergency) *NON INSURED FOR FUNDING: BEHAVIORAL MEDICINE [facility]: [phone]  National Crisis Intervention Hotline: 8-714.756.5882    National Suicide Crisis Hotline: 3-101.955.8651  I, the undersigned, have received and understand the above instructions          Patient/Rep Signature: __________________________________       Date/Time: ______________         Relationship: __________________________________________       Date/Time: ______________         Physician Signature: ____________________________________      Date/Time: ______________               Signature: ________________________________       Date/Time: ______________

## 2018-02-12 ENCOUNTER — OFFICE VISIT (OUTPATIENT)
Dept: PSYCHOLOGY | Facility: CLINIC | Age: 43
End: 2018-02-12
Payer: COMMERCIAL

## 2018-02-12 DIAGNOSIS — F41.1 GENERALIZED ANXIETY DISORDER: ICD-10-CM

## 2018-02-12 DIAGNOSIS — F33.2 MAJOR DEPRESSIVE DISORDER, RECURRENT SEVERE WITHOUT PSYCHOTIC FEATURES (HCC): Primary | ICD-10-CM

## 2018-02-12 PROCEDURE — S9480 INTENSIVE OUTPATIENT PSYCHIA: HCPCS

## 2018-02-12 NOTE — PSYCH
Subjective:     Patient ID: Ban Villavicencio is a 38 y o  female  [facility] Clinical Progress Notes      Specialized Services Documentation  Therapist must complete separate progress note for each specific clinical activity in which the individual participated during the day  Group Psychotherapy [id number] Tx Plan Objective: 1 5, 1 6Therapist:  Jasbir Rehman RN    [id number] Dash Kraus participated in wellness group focused on change  Handout provided on changing habits and focusing on positive coping strategies to be successful in continued wellness and recovery  Dash Kraus  shared during group the strategies she has gained through programming including breathing, prayer and utilizing supports  Dash Kraus continues to make progress toward goal 1 5, 1 6  Continue group to provide opportunities to learn, and practice healthy strategies to assist with change and recovery  Case Management Note: Reviewed discharge instructions and relapse prevention plan with Marleni  Copy of support groups given to Dash Kraus  No scripts needed as she is established with Dr Rozina Matthew  Information for support group/Depression Bipolar at [address] provided to her  Dash Kraus verbalized an understanding of discharge instructions and had no questions  Jasbir Rehman RN    Current suicide risk : Low         Medications changes/added/denied? No    Treatment session number: IOP 5    Individual Case Management Visit provided today?  Yes     [facility] follow up physician's orders: N/A

## 2018-02-12 NOTE — PSYCH
Subjective:     Patient ID: Frida Mckeon is a 38 y o  female  [facility] Clinical Progress Notes      Specialized Services Documentation  Therapist must complete separate progress note for each specific clinical activity in which the individual participated during the day  Group Psychotherapy 10:45 to 11:45 Marleni participated in a group playing [employer] Box  The question being asked was what would you like to see different in your life  Nella Talavera completed the written work and participated in the discussion  She said that she learned from the conversation of the other participants  She liked the [employer] Box image with the idea that there is always HOPE  She continues to benefit from group participation        Tx Plan Objective: 1 5, 1 6 Therapist: Mariah Townsend

## 2018-02-12 NOTE — PSYCH
[facility] Clinical Progress Notes      Specialized Services Documentation  Therapist must complete separate progress note for each specific clinical activity in which the individual participated during the day         [facility] follow up physician's orders:   DATE 2/12/2018  TIME:  8:36 AM   DISCHARGE TODAY  Sofy Hart MD

## 2018-02-12 NOTE — PSYCH
Subjective:     Patient ID: Tim Luna is a 38 y o  female  [facility] Clinical Progress Notes      Specialized Services Documentation  Therapist must complete separate progress note for each specific clinical activity in which the individual participated during the day  Allied Therapy 4547-1187 Bjorn Carmichael actively shared in [facility] group focused on relaxation techniques and mindfulness strategies  She engaged in PMR and breathing techniques and shared that it was more effective for her today  Group explored practice of what skills to practice mindfulness through observing, describing and participating in a mamie listening and then engaging in song  Bjorn [place] identified that skills are helping and she benefitted from the deep listening exercise explored as a way to apply in the real world  Group discussed ways to apply to slowing down to make more effective choices  Positive effort noted toward treatment goal   Discharge at the end of treatment day      Tx Plan Objective: 1 6, Therapist:  Otilia MALLORY    Education Therapy   Time:  9076-6351  Previous goal met: Yes   Readiness to Learning: Receptive  Barriers to Learning: None  Learning Assessment  Time: 5951-5231  Education Completed: Illness, Medication, Aftercare and Wellness Tools  Teaching Method: Verbal, Written and Demonstration  Shared Area of Learning: Yes   Goal Set: Last treatment day - PHQ-9 was a 24 upon admission and a 6 today  Tx Plan Objective: 1 0, Therapist:  Otilia MALLORY

## 2018-02-14 NOTE — PSYCH
Subjective:     Patient ID: Tim Luna is a 38 y o  female  [facility] Discharge Summary:   Admission Date: 1/11/2018  Patient was referred by Dr Alfreda Woodall  Discharge Date: 2/12/2018  Was this a routine discharge? yes   Diagnosis: Axis I:   1  Major depressive disorder, recurrent severe without psychotic features ([facility] [address] )     2  Generalized anxiety disorder        Treating Physician: Dr Barrientos Mention  Treatment Complications: none   Presenting Need: Referred by Dr Alfreda Woodall due to increased depression, anxiety, poor concentration and sleep disturbances  Bjorn Carmichael had been isolating and having difficulty getting out of bed in the morning  She reported symptoms had been worse over the past month  She felt hopeless and helpless initially when starting PHP  Course of treatment includes:    group counseling, medication management, individual case management, allied therapy, psychoeducation, psychiatric evaluation, individual therapy  and psychopharm education  Treatment Progress: Attended total of 14 days of treatment initially with PHP and then a step down to IOP  Her treatment objectives were to reduce her symptoms of anxiety and depression and to increase her level of functioning at home  Bjorn Carmichael was engaged in treatment and was successful in attaining her treatment goals  She obtained a new part time job in a different field, [employer] and she reported the new job was going well  She gained control over her depressive and anxiety symptoms with  learned healthy coping strategies  She also began to increase her socialization and was able to identify multiple supports that included her  and daughter  She was tolerating her medications with no complaints  Aftercare recommendations include: Continue medication management with Dr Alfreda Woodall  In addition, she will be seeing a therapist and appointment will be made with Dr Kasey Guy office once she is discharged from [address] [place] [address]    Discharge Medications include:  Current Outpatient Prescriptions:     ARIPiprazole (ABILIFY) 2 mg tablet, Take 2 mg by mouth daily, Disp: , Rfl:     DULoxetine (CYMBALTA) 30 mg delayed release capsule, Take 30 mg by mouth Medrol Dose Pack scheduling ONLY, Disp: , Rfl:     DULoxetine (CYMBALTA) 60 mg delayed release capsule, Take 60 mg by mouth Medrol Dose Pack scheduling ONLY, Disp: , Rfl:     LORazepam (ATIVAN) 0 5 mg tablet, Take 0 5 mg by mouth 2 (two) times a day, Disp: , Rfl:     traZODone (DESYREL) 150 mg tablet, Take 150 mg by mouth daily at bedtime, Disp: , Rfl:

## 2018-02-26 NOTE — PSYCH
History of Present Illness  [facility] Clinical Progress Note [facility]:   Specialized Services Documentation - Therapist must complete separate progress note for each specific clinical activity in which the client participated during the day  [phone]) Group Psychotherapy: 5920-1704 Mary Powell participated in psychotherapy group focused on building self-worth  Mary Powell quietly engaged in group discussion, relating to peers that talked about being invalidated by their families as children and never feeling like she is good enough or worthy of love from other people  Group discussed sources of low self-worth and ways to begin to challenge perception in order to live a happier, more fulfilling life  Some mild progress noted toward goals  Continue psychotherapy group to encourage Mary Powell to explore stressors and coping  Devang Cerda MSW, LSW     (163) Group Psychotherapy: 3427-1070 Mary Powell participated in weekly wellness group to discuss what particular area of wellness that has been worked on up to today in Program and choice of area to continue working on for recovery as targeted in treatment plan, by choice or in WRAP  Mary Powell shared that she is spontaneously crying very often during the day and without any trigger  She stated that this is difficult for her and she would like to be able to control her emotions in public and maybe understand why she is crying; is it depression, is it menopause? She stated that she is going to work on stabilizing her mood to be less depressed  Mary Powell made moderate beginning progress toward goals  Continue to offer weekly wellness as a strategy to offer opportunity to focus on goals and identify areas of goal achievement and need  Treatment Plan Problem(s): 1 1,1 2  Abel Anderson RN       (679) Education Therapy Goals set - Practice self-care    Treatment Plan Problem(s): 1 1, 1 2  Education Therapy Time - 0900 - 0930 Previous goal was met  Readiness to Learning:   She is receptive to learning  There are  no barriers to learning  Learning Assessment Time - 1330 - 1400   Education completed on  illness and wellness tools  The teaching method was  verbal, written and demonstration  Shared area of learning: Yes  MASHA Mcnair MT-BC     (468) Allied Therapy 3373-6038 Bert Chang actively shared in [facility] group exploring DBT skill changing emotional responses  She engaged easily in task sharing triggers and responses to given emotions  Group explored differences between justified and unjustified emotions to prompting events and effective versus ineffective responses  Group reviewed skill âOpposite Actionâ - ie  feel lazy - get active and productive weekend choices offered  Some beginning progress noted toward goals as she participated, took notes, however limited self-disclosure  Continue AT to explore healthy emotional regulation and role in practicing wellness tools  Treatment Plan Problem(s): 1 1  MOHAMUD Ortiz       Case Management Note:   0252-1121 Bert Chang met with this CM/RN to inform that she did  prescription of Abilify 2 mg that was ordered for her by her OP psychiatrist and that she had discussed with Dr Gaudencio Guadarrama on admission yesterday  She has stopped taking the Rexaulti 1 mg PO as of 1/11/18 and began taking the Ability 2 mg one tablet daily as of 1/11/18  Bert Chang stated that she feels relieved that her new insurance company is covering this medication and that her copay is only $20 00  Treatment plan was reviewed and signed  No SI and no HI  Bert Chang denied any SE from medications  TREATMENT SESSION NUMBER: 2   Current suicide risk is low  Medications changed/added/denied:  Lasalle Oppenheim, RN      Active Problems    1  Generalized anxiety disorder (300 02) (F41 1)   2  Major depressive disorder, recurrent severe without psychotic features (296 33) (F33 2)    Past Medical History    1  Denied: History of seizure   2   Denied: History of traumatic injury of head    Allergies    1  Percocet TABS    Current Meds   1  DULoxetine HCl - 30 MG Oral Capsule Delayed Release Particles; TAKE 1 CAPSULE   Bedtime; Therapy: (Recorded:11Jan2018) to Recorded   2  DULoxetine HCl - 60 MG Oral Capsule Delayed Release Particles; TAKE 1 CAPSULE   Bedtime; Therapy: (Recorded:11Jan2018) to Recorded   3  LORazepam 0 5 MG Oral Tablet; Take 1 tablet twice daily; Therapy: (Recorded:11Jan2018) to Recorded   4  Rexulti 1 MG Oral Tablet; TAKE 1 TABLET Bedtime; Therapy: (Recorded:11Jan2018) to Recorded   5  TraZODone HCl - 150 MG Oral Tablet; TAKE 1 TABLET Bedtime; Therapy: (Recorded:11Jan2018) to Recorded    Family Psych History  Father    1  Family history of depression (V17 0) (Z81 8)  Sister    2  Family history of paranoid schizophrenia (V17 0) (Z81 8)    Social History    · Current every day smoker (305 1) (F17 200)   · Daily caffeine consumption   · Employed   · Graduated from high school   ·     Future Appointments    Date/Time Provider Specialty Site   01/15/2018 12:15 PM Amanda Riddle M D  Psychiatry [facility]   01/16/2018 12:00 PM Amanda Riddle M D  Psychiatry [facility]   01/17/2018 12:15 PM Amanda Riddle M D  Psychiatry [facility]   01/18/2018 11:15 AM KAR Barker  Psychiatry [facility]   01/19/2018 11:45 AM KAR Barker   [facility]     Signatures   Electronically signed by : Erenest Baumgarten, RN; Jan 12 2018  1:59PM EST                       (Author)    Electronically signed by : MASHA Jaime; Jan 12 2018  2:07PM EST                       (Author)    Electronically signed by : SUHAS Muller; Jan 12 2018  2:16PM EST                       (Author)    Electronically signed by : MOHAMUD Gonzáles; Jan 12 2018  2:29PM EST                       (Author)

## 2018-02-26 NOTE — PSYCH
History of Present Illness  [facility] Clinical Progress Note [facility]:   Specialized Services Documentation - Therapist must complete separate progress note for each specific clinical activity in which the client participated during the day  (353) Group Psychotherapy: 9366-1831 Kwaku Garcia was excused from wellness group today due to psychiatric evaluation  Abel Anderson RN     (045) Group Psychotherapy: (3685-7458) Kwaku Garcia was excused from psychotherapy group for initial assessment with CM  Devang Cerda MSW, LSW       (697) Education Therapy Goals set - start to work on changing negative thoughts    Treatment Plan Problem(s): 1 1  Education Therapy Time - 0900 - 0930, Time first treatment day   Readiness to Learning:  She is receptive to learning  There are  no barriers to learning  Learning Assessment Time - 1330 - 1400   Education completed on  illness, medication and wellness tools  The teaching method was  verbal  Shared area of learning: Yes  MOHAMUD Mahmood     (833) Allied Therapy 6748-8884 Kwaku Garcia actively shared in [facility] group focused on self-esteem and self-care  She engaged in mamie analysis and self-care discussion  Group was introduced to FAST skill for self-respect effectiveness  Group explored how to raise self-esteem, how self-esteem and self-care are related, and different ways to self-care  She was given hand-outs on topic  Kwaku Garcia shared that something she does for self-care is different creative activities  Beginning progress towards goal observed  Continue AT to further explore ways to increase self-esteem and self-care  MASHA Fall  Treatment Plan Problem(s): 1 1, 1 2  MOHAMUD Mahmood     ( ) Other 2539 Pre-auth completed with Madie/Narcisa Nayak insurance today with Yjacqueline Kid and four PHP days auth  #[id number] from 1/11-1/16 LCD  Marleni notified of same   Abel Anderson RN     Case Management Note:   5991-3295 This CM met with Kwaku Garcia who completed initial evaluation and signed ROIs  Crisis was given to Goran García and explained  Goran García stated that she will return to her OP psychiatrist, Dr Rosalino Ibrahim after D/C from [facility]  Discussed [facility] offerings and requirements for attendance  Goran García denied present SI and HI as well as any psychotic or manic symptoms  She discussed with Dr Vonnie Grijalva stepping down off Rexulti and taking Abilify instead due to SEs of increased depression and suicidal ideation and one attempt when Rexulti was increased, Goran García stated, by Dr Rosalino Ibrahim  She will contact her insurance company today to explore what her new prescription coverage will be as she is having difficulty paying for high cost of medications and she will also obtain a list of labs that she can go to for completion of labs on prescriptions Dr Vonnie Grijalva ordered today  TREATMENT SESSION NUMBER: 1   Current suicide risk is low  Medications changed/added/denied:  Natacha Velásquez RN      Active Problems    1  Generalized anxiety disorder (300 02) (F41 1)   2  Major depressive disorder, recurrent severe without psychotic features (296 33) (F33 2)    Past Medical History    1  Denied: History of seizure   2  Denied: History of traumatic injury of head    Allergies    1  Percocet TABS    Current Meds   1  DULoxetine HCl - 30 MG Oral Capsule Delayed Release Particles; TAKE 1 CAPSULE   Bedtime; Therapy: (Recorded:11Jan2018) to Recorded   2  DULoxetine HCl - 60 MG Oral Capsule Delayed Release Particles; TAKE 1 CAPSULE   Bedtime; Therapy: (Recorded:11Jan2018) to Recorded   3  LORazepam 0 5 MG Oral Tablet; Take 1 tablet twice daily; Therapy: (Recorded:11Jan2018) to Recorded   4  Rexulti 1 MG Oral Tablet; TAKE 1 TABLET Bedtime; Therapy: (Recorded:11Jan2018) to Recorded   5  TraZODone HCl - 150 MG Oral Tablet; TAKE 1 TABLET Bedtime; Therapy: (Recorded:11Jan2018) to Recorded    Family Psych History  Father    1  Family history of depression (V17 0) (Z81 8)  Sister    2  Family history of paranoid schizophrenia (V17 0) (Z81 8)    Social History    · Current every day smoker (305 1) (F17 200)   · Daily caffeine consumption   · Employed   · Graduated from high school   ·     Vitals  Signs   Recorded: [id number] 09:03AM   Temperature: 96 4 F, Oral  Heart Rate: 91, L Radial  Pulse Quality: Normal, L Radial  Respiration Quality: Normal  Respiration: 18  Systolic: 456, LUE, Sitting  Diastolic: 77, LUE, Sitting  Height: 5 ft 4 5 in  Weight: 172 lb 4 oz  BMI Calculated: 29 11  BSA Calculated: 1 85    Assessment    1  Major depressive disorder, recurrent severe without psychotic features (296 33) (F33 2)   2  Generalized anxiety disorder (300 02) (F41 1)   3  History of Knee Surgery Left   4  History of Oral Surgery Tooth Extraction   5  Family history of depression (V17 0) (Z81 8) : Father   6  Family history of paranoid schizophrenia (V17 0) (Z81 8) : Sister   9     8  Current every day smoker (305 1) (F17 200)   9  Graduated from high school   10  Daily caffeine consumption   11  Employed    Plan    1  (1) CBC/ PLT (NO DIFF); Status:Active; Requested [id number]:[id number];    2  (1) COMPREHENSIVE METABOLIC PANEL; Status:Active; Requested [id number]:[id number];    3  (1) LIPID PANEL, FASTING; Status:Active; Requested ZIZ:[id number]; Future Appointments    Date/Time Provider Specialty Site   01/12/2018 10:15 AM KAR Trujillo   [facility]     Signatures   Electronically signed by : SUHAS Hilliard; Jan 11 2018 12:51PM EST                       (Author)    Electronically signed by : Emelyn Robb RN; Jan 11 2018  1:36PM EST                       (Author)    Electronically signed by : MOHAMUD Rodriguez; Jan 11 2018  2:35PM EST                       (Author)    Electronically signed by : MASHA Brito; Jan 11 2018  3:14PM EST                       (Author)    Electronically signed by : Emelyn Robb RN; Jan 11 2018  3:42PM EST (Author)
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
## 2018-03-07 NOTE — PSYCH
History of Present Illness    Presenting Problems: Stressors: PATIENT STATES HAVING REALLY BAD DEPRESSION AND ANXIETY STATES BEEN IN BED FOR A MONTH AND JUST GIVEN UP  Referral Source: DR Sabiha Cohen  She is employed  at [employer]  She is a smoker  Symptoms: suicidal ideation, plan: NO PLAN, no self abusive behaviors, no homicidal thoughts, no history of violence toward others, depressed mood, anxiety, no psychosis, no medication noncompliance, sleep disturbances, change in appetite, no agitation, no hypomania and POOR CONCENTRATION  Provisional Diagnosis: Axis I: MDD and Axis II: BRITTNEE  Substance Abuse: alcohol, NEW YEARS DAY  Psychiatric Treatment History: NEW PRESPECTIVES 15 YEARS AGO, Current Psychiatrist: DR Sabiha Cohen and Therapist: NONE   The patient does not require ambulatory assistance  Legal Issues: The patient does not have legal issues  ACCEPTED  Appointment Date: 01/11/2018        Signatures   Electronically signed by : Mk Quan, ; Jan 9 2018  8:39AM EST                       (Author)

## 2018-04-02 ENCOUNTER — DOCUMENTATION (OUTPATIENT)
Dept: PSYCHOLOGY | Facility: CLINIC | Age: 43
End: 2018-04-02

## 2018-04-02 NOTE — PROGRESS NOTES
Major depressive disorder, recurrent severe without psychotic features ([facility] [address] ) +1 more                         RIANNA BELL     [facility]     [facility] Plan   AREAS OF NEEDS: Severe depression and anxiety as manifested by suicidal thoughts without a plan, sleep disturbances, poor concentration, isolation and spending most of time in bed past three months, crying often, feeling hopeless and helpless and getting no kristian in life  Date Initiated: 18      LONG TERM GOAL: 1 0 I will identify three signs that I am more in control of my mood, less depressed and anxious and more productive in my daily life  Date Initiated: 18   Target Date: 18       Date Initiated: 18    1 1 I will identify three things I need to do on a daily basis to take care of myself  Target Date: 18    Date Initiated: 18    1 2 I will name two things I can do to decrease my isolation and increase healthy socialization and support  Target Date: 18    Date Initiated: 18    1 3 I will take Abilify as ordered and I will bring any questions or concerns I have regarding my medications to CM/Program Psychiatrist when or if they occur  Target Date: 18    Date Initiated: 18    1 4 I will identify four supports and state how each of my supports will help me in my recovery  Target Date: 18            7 DAY REVISION: 1 1,1 2,1 3,1 4 Continue goals as they remain relevant but unmet , 1 5 I will name two ways I can increase socialization and support and decrease isolation  Date Initiated: 18    Target Date: 18    Date Initiated: 18    Target Date: 18     Completion Date:  18            PSYCHIATRY: Medication management and education  Continue medication management and education     Date Initiated: 2018   Revision Date: 2018   The person(s) responsible for carrying out the plan is Kiara Gaspar MD    NURSIN 1,1 2,1 3,1 4 This RN will provide daily wellness group five days weekly to educate Arash Up on S/S of her diagnoses and medications used in treatment  1 1,1 2,1 3,1 4,1 5 This RN will continue to provide daily wellness group to educate Arash Up on her diagnoses and medications  Date Initiated: 1/11/18    Revision Date: 1/22/18      The person(s) responsible for carrying out the plan is Joselo Martel RN    PSYCHOLOGY: 1 1, 1 2, 1 4 Provide psychotherapy group 5 times per week to allow opportunity for Arash Up to explore stressors and ways of coping  1 1, 1 2, 1 4 continue to provide psychotherapy group each program day to encourage Arash Up to further explore stressors and healthier ways of coping       Date Initiated: 1/11/2018   Revision Date: 1/22/2018   The person(s) responsible for carrying out the plan is Madonna Nageotte, MSW \[facility]\""  ALLIED THERAPY: 1 1, 1 2 Provide Marleni AT group 5 times weekly to aid in understanding and use of wellness tools through engagement and discussion of meaningful tasks  MASHA Kam   1 1, 1 2 Continue to provide Marleni AT group 5 times weekly to aid in understand and use of wellness tools, and transfer of skills to home, to promote recovery through meaningful tasks  MASHA Kam    Date Initiated: 1/11/18   Revision Date: 1/22/18   The person(s) responsible for carrying out the plan is EDILIA ArceBC            CASE MANAGEMENT: 1 0 This CM will meet regularly with Arash Up to assess progress in Program, provide assistance with D/C planning, UR, and supports building  Date Initiated: 1/11/18   Revision Date: 1/22/18   The person(s) responsible for carrying out the plan is Joselo Martel RN             DISCHARGE CRITERIA: I will identify three signs of improvement and complete my relapse prevention plan     DISCHARGE PLAN: Arash Up will arrange outpatient therapist once information obtained from her insurance company and she will return to Dr Lanre Granger at his office in [place], [place] after D/C     Estimated Length of Stay: 10 days   Strengths: Responsible, motivated to increase level of functioning and decrease depression and anxiety   Limitations: Financial issues due to not working, feeling guilty about being depressed (for her children/family) "putting them through this"   Diagnosis and Treatment Plan explained to patient, patient relates understanding diagnosis and is agreeable to Treatment Plan             CLIENT COMMENTS / Please share your thoughts, feelings, need and/or experiences regarding your treatment plan: _____________________________________________________________________________________________________________________________________________________________________________________________________________________________________________________________________________________________________________________ Date/Time: ______________                        Patient Signature: _________________________________ Date/Time: ______________      Signatures  Electronically signed by : KAR Palm ; Jan 11 2018  8:58AM EST                       (Author)  Electronically signed by : SUHAS Hanley; Jan 11 2018  9:09AM EST                       (Author)  Electronically signed by : MASHA Dickson; Jan 11 2018 10:04AM EST                       (Author)  Electronically signed by : MOHAMUD Blanchard; Jan 11 2018 10:27AM EST                       (Author)  Electronically signed by : Prince Petersen RN; Jan 11 2018  4:09PM EST                       (Author)  Electronically signed by : Prince Petersen RN; Raymond 15 2018 12:32PM EST                       (Author)  Electronically signed by : MASHA Dickson; Jan 22 2018  2:13PM EST                       (Author)  Electronically signed by : MOHAMUD Blanchard; Jan 22 2018  2:14PM EST                       (Author)  Electronically signed by : SUHAS Hanley; Jan 22 2018  2:23PM EST (Author)  Electronically signed by : Yris Young RN; Jan 22 2018  2:57PM EST                       (Author)  Electronically signed by : Yris Young RN; Jan 22 2018  2:57PM EST                       (Author)  Electronically signed by : KAR Cabral ; Jan 22 2018  4:30PM EST                       (Author)

## 2018-10-01 ENCOUNTER — HOSPITAL ENCOUNTER (INPATIENT)
Facility: HOSPITAL | Age: 43
LOS: 1 days | DRG: 812 | End: 2018-10-03
Attending: EMERGENCY MEDICINE | Admitting: ANESTHESIOLOGY
Payer: COMMERCIAL

## 2018-10-01 DIAGNOSIS — T14.91XA SUICIDAL BEHAVIOR WITH ATTEMPTED SELF-INJURY (HCC): ICD-10-CM

## 2018-10-01 DIAGNOSIS — F33.2 MAJOR DEPRESSIVE DISORDER, RECURRENT SEVERE WITHOUT PSYCHOTIC FEATURES (HCC): ICD-10-CM

## 2018-10-01 DIAGNOSIS — T50.901A OVERDOSE: Primary | ICD-10-CM

## 2018-10-01 DIAGNOSIS — Z00.00 PHYSICAL EXAM: ICD-10-CM

## 2018-10-01 PROBLEM — E87.6 HYPOKALEMIA: Status: ACTIVE | Noted: 2018-10-01

## 2018-10-01 PROBLEM — T50.902A INTENTIONAL DRUG OVERDOSE (HCC): Status: ACTIVE | Noted: 2018-10-01

## 2018-10-01 LAB
ALBUMIN SERPL BCP-MCNC: 3.3 G/DL (ref 3.5–5)
ALP SERPL-CCNC: 65 U/L (ref 46–116)
ALT SERPL W P-5'-P-CCNC: 17 U/L (ref 12–78)
AMPHETAMINES SERPL QL SCN: NEGATIVE
ANION GAP SERPL CALCULATED.3IONS-SCNC: 13 MMOL/L (ref 4–13)
APAP SERPL-MCNC: <2 UG/ML (ref 10–30)
APTT PPP: 23 SECONDS (ref 24–36)
AST SERPL W P-5'-P-CCNC: 13 U/L (ref 5–45)
ATRIAL RATE: 136 BPM
BARBITURATES UR QL: NEGATIVE
BASOPHILS # BLD AUTO: 0.09 THOUSANDS/ΜL (ref 0–0.1)
BASOPHILS NFR BLD AUTO: 1 % (ref 0–1)
BENZODIAZ UR QL: NEGATIVE
BILIRUB SERPL-MCNC: 0.4 MG/DL (ref 0.2–1)
BILIRUB UR QL STRIP: NEGATIVE
BUN SERPL-MCNC: 8 MG/DL (ref 5–25)
CALCIUM SERPL-MCNC: 8.3 MG/DL (ref 8.3–10.1)
CHLORIDE SERPL-SCNC: 104 MMOL/L (ref 100–108)
CLARITY UR: CLEAR
CO2 SERPL-SCNC: 23 MMOL/L (ref 21–32)
COCAINE UR QL: NEGATIVE
COLOR UR: YELLOW
CREAT SERPL-MCNC: 0.96 MG/DL (ref 0.6–1.3)
EOSINOPHIL # BLD AUTO: 0.08 THOUSAND/ΜL (ref 0–0.61)
EOSINOPHIL NFR BLD AUTO: 1 % (ref 0–6)
ERYTHROCYTE [DISTWIDTH] IN BLOOD BY AUTOMATED COUNT: 13.9 % (ref 11.6–15.1)
ETHANOL SERPL-MCNC: <3 MG/DL (ref 0–3)
EXT PREG TEST URINE: NEGATIVE
GFR SERPL CREATININE-BSD FRML MDRD: 72 ML/MIN/1.73SQ M
GLUCOSE SERPL-MCNC: 155 MG/DL (ref 65–140)
GLUCOSE UR STRIP-MCNC: NEGATIVE MG/DL
HCT VFR BLD AUTO: 45 % (ref 34.8–46.1)
HGB BLD-MCNC: 15.1 G/DL (ref 11.5–15.4)
HGB UR QL STRIP.AUTO: NEGATIVE
IMM GRANULOCYTES # BLD AUTO: 0.06 THOUSAND/UL (ref 0–0.2)
IMM GRANULOCYTES NFR BLD AUTO: 1 % (ref 0–2)
INR PPP: 1.07 (ref 0.86–1.17)
KETONES UR STRIP-MCNC: NEGATIVE MG/DL
LEUKOCYTE ESTERASE UR QL STRIP: NEGATIVE
LIPASE SERPL-CCNC: 94 U/L (ref 73–393)
LYMPHOCYTES # BLD AUTO: 2.26 THOUSANDS/ΜL (ref 0.6–4.47)
LYMPHOCYTES NFR BLD AUTO: 26 % (ref 14–44)
MAGNESIUM SERPL-MCNC: 1.6 MG/DL (ref 1.6–2.6)
MCH RBC QN AUTO: 30.4 PG (ref 26.8–34.3)
MCHC RBC AUTO-ENTMCNC: 33.6 G/DL (ref 31.4–37.4)
MCV RBC AUTO: 91 FL (ref 82–98)
METHADONE UR QL: NEGATIVE
MONOCYTES # BLD AUTO: 0.58 THOUSAND/ΜL (ref 0.17–1.22)
MONOCYTES NFR BLD AUTO: 7 % (ref 4–12)
NEUTROPHILS # BLD AUTO: 5.75 THOUSANDS/ΜL (ref 1.85–7.62)
NEUTS SEG NFR BLD AUTO: 64 % (ref 43–75)
NITRITE UR QL STRIP: NEGATIVE
NRBC BLD AUTO-RTO: 0 /100 WBCS
OPIATES UR QL SCN: NEGATIVE
P AXIS: 70 DEGREES
PCP UR QL: NEGATIVE
PH UR STRIP.AUTO: 6 [PH] (ref 4.5–8)
PLATELET # BLD AUTO: 208 THOUSANDS/UL (ref 149–390)
PMV BLD AUTO: 10.6 FL (ref 8.9–12.7)
POTASSIUM SERPL-SCNC: 3.2 MMOL/L (ref 3.5–5.3)
PR INTERVAL: 116 MS
PROT SERPL-MCNC: 6.9 G/DL (ref 6.4–8.2)
PROT UR STRIP-MCNC: NEGATIVE MG/DL
PROTHROMBIN TIME: 13.8 SECONDS (ref 11.8–14.2)
QRS AXIS: 84 DEGREES
QRSD INTERVAL: 76 MS
QT INTERVAL: 318 MS
QTC INTERVAL: 478 MS
RBC # BLD AUTO: 4.96 MILLION/UL (ref 3.81–5.12)
SALICYLATES SERPL-MCNC: 3.6 MG/DL (ref 3–20)
SODIUM SERPL-SCNC: 140 MMOL/L (ref 136–145)
SP GR UR STRIP.AUTO: 1.02 (ref 1–1.03)
T WAVE AXIS: 40 DEGREES
THC UR QL: NEGATIVE
TROPONIN I SERPL-MCNC: <0.02 NG/ML
TSH SERPL DL<=0.05 MIU/L-ACNC: 1.81 UIU/ML (ref 0.36–3.74)
UROBILINOGEN UR QL STRIP.AUTO: 0.2 E.U./DL
VENTRICULAR RATE: 136 BPM
WBC # BLD AUTO: 8.82 THOUSAND/UL (ref 4.31–10.16)

## 2018-10-01 PROCEDURE — 83735 ASSAY OF MAGNESIUM: CPT | Performed by: EMERGENCY MEDICINE

## 2018-10-01 PROCEDURE — 36415 COLL VENOUS BLD VENIPUNCTURE: CPT | Performed by: EMERGENCY MEDICINE

## 2018-10-01 PROCEDURE — 96366 THER/PROPH/DIAG IV INF ADDON: CPT

## 2018-10-01 PROCEDURE — 93010 ELECTROCARDIOGRAM REPORT: CPT | Performed by: INTERNAL MEDICINE

## 2018-10-01 PROCEDURE — 80320 DRUG SCREEN QUANTALCOHOLS: CPT | Performed by: EMERGENCY MEDICINE

## 2018-10-01 PROCEDURE — 96374 THER/PROPH/DIAG INJ IV PUSH: CPT

## 2018-10-01 PROCEDURE — 96375 TX/PRO/DX INJ NEW DRUG ADDON: CPT

## 2018-10-01 PROCEDURE — 85025 COMPLETE CBC W/AUTO DIFF WBC: CPT | Performed by: EMERGENCY MEDICINE

## 2018-10-01 PROCEDURE — 81025 URINE PREGNANCY TEST: CPT | Performed by: EMERGENCY MEDICINE

## 2018-10-01 PROCEDURE — 99220 PR INITIAL OBSERVATION CARE/DAY 70 MINUTES: CPT | Performed by: INTERNAL MEDICINE

## 2018-10-01 PROCEDURE — 80053 COMPREHEN METABOLIC PANEL: CPT | Performed by: EMERGENCY MEDICINE

## 2018-10-01 PROCEDURE — 84443 ASSAY THYROID STIM HORMONE: CPT | Performed by: EMERGENCY MEDICINE

## 2018-10-01 PROCEDURE — 81003 URINALYSIS AUTO W/O SCOPE: CPT | Performed by: EMERGENCY MEDICINE

## 2018-10-01 PROCEDURE — 80329 ANALGESICS NON-OPIOID 1 OR 2: CPT | Performed by: EMERGENCY MEDICINE

## 2018-10-01 PROCEDURE — 85610 PROTHROMBIN TIME: CPT | Performed by: EMERGENCY MEDICINE

## 2018-10-01 PROCEDURE — 80307 DRUG TEST PRSMV CHEM ANLYZR: CPT | Performed by: EMERGENCY MEDICINE

## 2018-10-01 PROCEDURE — 99285 EMERGENCY DEPT VISIT HI MDM: CPT

## 2018-10-01 PROCEDURE — 84484 ASSAY OF TROPONIN QUANT: CPT | Performed by: EMERGENCY MEDICINE

## 2018-10-01 PROCEDURE — 96365 THER/PROPH/DIAG IV INF INIT: CPT

## 2018-10-01 PROCEDURE — 93005 ELECTROCARDIOGRAM TRACING: CPT

## 2018-10-01 PROCEDURE — 83690 ASSAY OF LIPASE: CPT | Performed by: EMERGENCY MEDICINE

## 2018-10-01 PROCEDURE — 85730 THROMBOPLASTIN TIME PARTIAL: CPT | Performed by: EMERGENCY MEDICINE

## 2018-10-01 PROCEDURE — 99449 NTRPROF PH1/NTRNET/EHR 31/>: CPT | Performed by: EMERGENCY MEDICINE

## 2018-10-01 PROCEDURE — 96361 HYDRATE IV INFUSION ADD-ON: CPT

## 2018-10-01 RX ORDER — POTASSIUM CHLORIDE AND SODIUM CHLORIDE 900; 300 MG/100ML; MG/100ML
250 INJECTION, SOLUTION INTRAVENOUS CONTINUOUS
Status: DISCONTINUED | OUTPATIENT
Start: 2018-10-01 | End: 2018-10-02

## 2018-10-01 RX ORDER — POTASSIUM CHLORIDE 29.8 MG/ML
40 INJECTION INTRAVENOUS ONCE
Status: DISCONTINUED | OUTPATIENT
Start: 2018-10-01 | End: 2018-10-01 | Stop reason: CLARIF

## 2018-10-01 RX ORDER — MAGNESIUM SULFATE 1 G/100ML
1 INJECTION INTRAVENOUS ONCE
Status: COMPLETED | OUTPATIENT
Start: 2018-10-01 | End: 2018-10-01

## 2018-10-01 RX ORDER — ONDANSETRON 2 MG/ML
4 INJECTION INTRAMUSCULAR; INTRAVENOUS ONCE
Status: COMPLETED | OUTPATIENT
Start: 2018-10-01 | End: 2018-10-01

## 2018-10-01 RX ORDER — POTASSIUM CHLORIDE 14.9 MG/ML
20 INJECTION INTRAVENOUS
Status: COMPLETED | OUTPATIENT
Start: 2018-10-01 | End: 2018-10-01

## 2018-10-01 RX ORDER — NICOTINE 21 MG/24HR
1 PATCH, TRANSDERMAL 24 HOURS TRANSDERMAL DAILY
Status: DISCONTINUED | OUTPATIENT
Start: 2018-10-02 | End: 2018-10-03 | Stop reason: HOSPADM

## 2018-10-01 RX ORDER — ONDANSETRON 2 MG/ML
4 INJECTION INTRAMUSCULAR; INTRAVENOUS EVERY 6 HOURS PRN
Status: DISCONTINUED | OUTPATIENT
Start: 2018-10-01 | End: 2018-10-03 | Stop reason: HOSPADM

## 2018-10-01 RX ADMIN — MAGNESIUM SULFATE HEPTAHYDRATE 1 G: 1 INJECTION, SOLUTION INTRAVENOUS at 17:36

## 2018-10-01 RX ADMIN — POTASSIUM CHLORIDE 20 MEQ: 200 INJECTION, SOLUTION INTRAVENOUS at 15:56

## 2018-10-01 RX ADMIN — ONDANSETRON 4 MG: 2 INJECTION INTRAMUSCULAR; INTRAVENOUS at 14:52

## 2018-10-01 RX ADMIN — POTASSIUM CHLORIDE 20 MEQ: 200 INJECTION, SOLUTION INTRAVENOUS at 18:36

## 2018-10-01 RX ADMIN — SODIUM CHLORIDE 1000 ML: 0.9 INJECTION, SOLUTION INTRAVENOUS at 14:50

## 2018-10-01 RX ADMIN — SODIUM CHLORIDE 1000 ML: 0.9 INJECTION, SOLUTION INTRAVENOUS at 16:58

## 2018-10-01 RX ADMIN — SODIUM CHLORIDE 1000 ML: 0.9 INJECTION, SOLUTION INTRAVENOUS at 19:54

## 2018-10-01 RX ADMIN — SODIUM CHLORIDE 1000 ML: 0.9 INJECTION, SOLUTION INTRAVENOUS at 23:41

## 2018-10-01 RX ADMIN — POTASSIUM CHLORIDE AND SODIUM CHLORIDE 150 ML/HR: 900; 300 INJECTION, SOLUTION INTRAVENOUS at 22:05

## 2018-10-01 NOTE — ASSESSMENT & PLAN NOTE
Add potassium in IV fluids  Magnesium also on the lower side and received IV magnesium sulfate in the ER

## 2018-10-01 NOTE — ASSESSMENT & PLAN NOTE
She ingested 50 tablets-100 mg each I believe-total of 5000 mg   QT interval is not prolonged  Patient was evaluated by critical care team in the ER  As her blood pressure has come up, they have advised that patient to be observed on the floor  Would continue with tele monitoring  Would give IV fluids continues and also p r n  IV boluses for low blood pressure

## 2018-10-01 NOTE — ASSESSMENT & PLAN NOTE
Hold benzodiazepines as well as above because of increasing lethargic/overdose on trazodone low blood pressure at times

## 2018-10-01 NOTE — ASSESSMENT & PLAN NOTE
patient would be kept in the hospital   She tried to hurt herself  She is lethargic although arousable and answered questions-most of them appropriately  She would need to be seen by Psychiatry  I obtained the consent  Would hold all of her home medications at this point because of her current condition as she was also hypotensive in the ER  Would have 1 on 1 observation  Case management would be consulted for inpatient psych treatment

## 2018-10-01 NOTE — CONSULTS
PHONE [address] Toxicology  Frida Alert 38 y o  female MRN: [id number]  Unit/Bed#: -01 Encounter: [id number]      Reason for Consult / Principal Problem: Overdose  Inpatient consult to Toxicology  Consult performed by: Emely Cage ordered by: RAZA Souza        10/01/18  1500    ASSESSMENT:  38year-old female with trazodone overdose  1    Trazodone overdose  2  Encephalopathy  3  Tachycardia improved  4  Hypokalemia    RECOMMENDATIONS:    Please continue to observe patient and provide supportive care, IV fluids, and cardiac monitor  Please add in acetaminophen and salicylate concentration to the patient's laboratory evaluation  Please optimize her electrolytes by giving magnesium and correcting her current hypokalemia  Given that the patient is symptomatic with somnolence 90 minutes after ingestion, it is likely that she will require overnight observation prior to returning to baseline  The primary symptom of trazodone overdose is central nervous system depression  In large overdose, rarely, bradycardia and hypotension may occur  QT interval prolongation may also occur, and must be of concern if patient also develops bradycardia as this would place patient risk of torsades  Serotonin syndrome is rare but reported in large overdose  Currently, EKG is reassuring and vital signs have normalized despite initial tachycardia  Observation for an additional 1-2 hours in the emergency department with repeat exam and ECG should assist and determining disposition to medical floor with telemetry versus step-down  However, it is unlikely she will require the medical ICU unless she were to become hemodynamically unstable  Bradycardia and hypotension in this setting is easily treated and most often responds to IV fluids alone, but vasopressors such as epinephrine and norepinephrine would be 2nd line    Please follow up on acetaminophen concentration and discusse with Toxicology should it be above reference range  For further questions, please contact the medical  on call via [place] [employer] or throughl the [employer] or Patient [facility]  Please see additional teaching note below (if available)    Department of Medical Toxicology  [address] [place] [address]  Trazodone Toxicity Discussion    Background:  Trazodone is an atypical tetracyclic antidepressant with anxiolytic and hypnotic properties  It is primarily used to treat depression and in some cases, to aid in sleep  Pharmacology:   Mechanisms of Action:   Trazodone increases serotonin activity by three mechanisms:   1  5-jbmfmzuvprrzhwrkm-6X (5HT2A) receptor antagonism   2  Selective reuptake inhibition  3  Postsynaptic serotonin agonism via the active metabolite, m-chlorophenyl piperazine  Additionally, it is also an alpha-1 adrenergic receptor antagonist     Maximum daily dose is 400-600mg   Immediate release trazodone is absorbed rapidly with peak serum concentrations occurring in 1-2 hours after dosing  Serum concentration may not peak until at least 9 hours with extended release formulary  Toxicity:  Inadvertent ingestions are rarely associated with toxicity  Overdoses generally produce mild to moderate toxicity  Symptoms may include drowsiness, lethargy, ataxia, nausea, vomiting, myoclonus, seizures, bradycardia, hypotension, and priapism  There are also reports of QT prolongation, torsades de pointes, and serotonin syndrome  Treatment:  Treatment is supportive  Asymptomatic patients can be observed for 4-6 hours if immediate release is ingested and 12-16 hours if extended release is ingested  If no development of symptoms during that timeframe, then the patient will unlikely become ill  Symptomatic patients should be admitted  Hypotension is treated with intravenous fluids, and if necessary, vasopressors   Bradycardia associated with hypotension may be treated with atropine or vasopressors  Seizures can be treated with benzodiazepines  QT prolongation should be treated with electrolyte optimization  Resources: [employer]  Last updated 9/21/18      Hx and PE limited by the dynamics of a phone consultation  I have not personally interviewed or evaluated the patient, but only advised based on the information provided to me  Primary provider is responsible for all clinical decisions  Pertinent history, physical exam and clinical findings and course discussed: Gisel Brothers is a 38y o  year old female who presents with somnolence and nausea 90 minutes after reportedly ingesting 100 trazodone 50 milligram tablets  She denies any other ingestions  She was tachycardic on arrival but this improved with time and IV fluids  Laboratory evaluation was underway during the time of our discussion  EKG showed normal intervals  She is also on other psychoactive meds as shown below  It was reported that her exam at the time of our conversation was with normal vital signs, somnolence, and no overt toxidromimic findings  Review of systems and physical exam not performed by me  Historical Information   History reviewed  No pertinent past medical history  History reviewed  No pertinent surgical history  Social History   History   Alcohol Use No     History   Drug Use No     History   Smoking Status    Current Every Day Smoker    Packs/day: 1 00    Types: Cigarettes   Smokeless Tobacco    Never Used     History reviewed  No pertinent family history  Prior to Admission medications    Medication Sig Start Date End Date Taking?  Authorizing Provider   ARIPiprazole (ABILIFY) 2 mg tablet Take 2 mg by mouth daily    Historical Provider, MD   DULoxetine (CYMBALTA) 30 mg delayed release capsule Take 30 mg by mouth Medrol Dose Pack scheduling ONLY    Historical Provider, MD   DULoxetine (CYMBALTA) 60 mg delayed release capsule Take 60 mg by mouth Medrol Dose Pack scheduling ONLY    Historical Provider, MD   LORazepam (ATIVAN) 0 5 mg tablet Take 0 5 mg by mouth 2 (two) times a day    Historical Provider, MD   traZODone (DESYREL) 150 mg tablet Take 150 mg by mouth daily at bedtime    Historical Provider, MD       Current Facility-Administered Medications   Medication Dose Route Frequency    [START ON 10/2/2018] enoxaparin (LOVENOX) subcutaneous injection 40 mg  40 mg Subcutaneous Q24H [facility]    [START ON 10/2/2018] nicotine (NICODERM CQ) 21 mg/24 hr TD 24 hr patch 1 patch  1 patch Transdermal Daily    ondansetron (ZOFRAN) injection 4 mg  4 mg Intravenous Q6H PRN    sodium chloride 0 9 % bolus 1,000 mL  1,000 mL Intravenous Q4H PRN    sodium chloride 0 9 % with KCl 40 mEq/L infusion (premix)  150 mL/hr Intravenous Continuous       Allergies   Allergen Reactions    Oxycodone-Acetaminophen Hives and Itching     vomiting and dizziness    Pentosan Polysulfate Hives and Itching    Pollen Extract     Sulfa Antibiotics Hives, Vomiting, Itching and Rash       Objective       Intake/Output Summary (Last 24 hours) at 10/01/18 2321  Last data filed at 10/01/18 2205   Gross per 24 hour   Intake             2100 ml   Output                0 ml   Net             2100 ml       Invasive Devices:   Peripheral IV 10/01/18 Left Antecubital (Active)   Site Assessment Clean;Dry; Intact 10/1/2018  2:48 PM   Dressing Type Transparent 10/1/2018  2:48 PM   Line Status Saline locked; Flushed 10/1/2018  2:48 PM   Dressing Status Clean;Dry; Intact 10/1/2018  2:48 PM       Vitals   Vitals:    10/01/18 1900 10/01/18 1931 10/01/18 1949 10/01/18 2310   BP: 97/55 112/70 102/55 (!) 82/46   TempSrc:   Oral Oral   Pulse: 80 82 86 66   Resp: 18 18 18   Patient Position - Orthostatic VS: Lying Lying Lying Lying   Temp:   98 6 °F (37 °C) 97 5 °F (36 4 °C)         EKG, Pathology, and/or Other Studies: I have discussed the EKG with the primary emergency provider who informed me sinus tachycardia with a rate of 136 beats per minute, QRS interval of 76 milliseconds, QTC interval of 478 milliseconds  Lab Results: I have personally reviewed pertinent reports  Labs:    Results from last 7 days  Lab Units 10/01/18  1448   WBC Thousand/uL 8 82   HEMOGLOBIN g/dL 15 1   HEMATOCRIT % 45 0   PLATELETS Thousands/uL 208   NEUTROS PCT % 64   LYMPHS PCT % 26   MONOS PCT % 7        Results from last 7 days  Lab Units 10/01/18  1522 10/01/18  1448   SODIUM mmol/L  --  140   POTASSIUM mmol/L  --  3 2*   CHLORIDE mmol/L  --  104   CO2 mmol/L  --  23   BUN mg/dL  --  8   CREATININE mg/dL  --  0 96   CALCIUM mg/dL  --  8 3   ALK PHOS U/L  --  65   ALT U/L  --  17   AST U/L  --  13   MAGNESIUM mg/dL 1 6  --         Results from last 7 days  Lab Units 10/01/18  1448   INR  1 07   PTT seconds 23*           0  Lab Value Date/Time   TROPONINI <0 02 10/01/2018 1448           Results from last 7 days  Lab Units 10/01/18  1522 10/01/18  1448   ACETAMINOPHEN LVL ug/mL  --  <2 0*   ETHANOL LVL mg/dL  --  <3   SALICYLATE LVL mg/dL 3 6  --        Results from last 7 days  Lab Units 10/01/18  1801   EXT PREG TEST URINE  negative       Counseling / Coordination of Care  Total time spent today 35 minutes  This was a phone consultation

## 2018-10-01 NOTE — ASSESSMENT & PLAN NOTE
Hold current medications  Would resume them when patient is more awake and alert and also less hypotensive  Would also wait for psych input regarding that    She would need inpatient psychiatric treatment

## 2018-10-01 NOTE — H&P
H&P- Chelsi Bio 1975, 38 y o  female MRN: [id number]    Unit/Bed#: ED 29 Encounter: [id number]    Primary Care Provider: Seun Maxwell MD   Date and time admitted to hospital: 10/1/2018  2:19 PM          Assessment and plan:      Suicidal behavior with attempted self-injury [facility])   Assessment & Plan     patient would be kept in the hospital   She tried to hurt herself  She is lethargic although arousable and answered questions-most of them appropriately  She would need to be seen by Psychiatry  I obtained the consent  Would hold all of her home medications at this point because of her current condition as she was also hypotensive in the ER  Would have 1 on 1 observation  Case management would be consulted for inpatient psych treatment  * Intentional drug overdose ([facility] [address] )   Assessment & Plan    She ingested 50 tablets-100 mg each I believe-total of 5000 mg   QT interval is not prolonged  Patient was evaluated by critical care team in the ER  As her blood pressure has come up, they have advised that patient to be observed on the floor  Would continue with tele monitoring  Would give IV fluids continues and also p r n  IV boluses for low blood pressure  Major depressive disorder, recurrent severe without psychotic features [facility])   Assessment & Plan    Hold current medications  Would resume them when patient is more awake and alert and also less hypotensive  Would also wait for psych input regarding that  She would need inpatient psychiatric treatment     Generalized anxiety disorder   Assessment & Plan    Hold benzodiazepines as well as above because of increasing lethargic/overdose on trazodone low blood pressure at times  Hypokalemia   Assessment & Plan    Add potassium in IV fluids  Magnesium also on the lower side and received IV magnesium sulfate in the ER  Tobacco abuse:    would put her on nicotine patch    She would be consult about quitting when stable and more awake      If her condition worse is or she becomes again hypotensive and not responsive to IV fluids, she would benefit from step-down unit  Patient was evaluated by critical care team in the ER  However, she is going to be on the floor with close monitoring and 1 on 1 observation    Hospital Problem List:     Principal Problem:    Intentional drug overdose [facility])  Active Problems:    Suicidal behavior with attempted self-injury ([facility] [address] )    Major depressive disorder, recurrent severe without psychotic features [facility]    Generalized anxiety disorder    Hypokalemia    Present on Admission:   Major depressive disorder, recurrent severe without psychotic features [facility]   Generalized anxiety disorder   Int[facility] drug overdose [facility]   Suicidal behavior with attempted self-injury [facility]   Hypokalemia      VTE Prophylaxis: Enoxaparin (Lovenox)  / sequential compression device   Code Status:  Full code  POLST: There is no POLST form on file for this patient (pre-hospital)    Anticipated Length of Stay:  Patient will be admitted on an Observation basis with an anticipated length of stay of  less than 2 midnights  Justification for Hospital Stay:  IV fluids and monitoring    Total Time for Visit, including Counseling / Coordination of Care: 45 minutes  Greater than 50% of this total time spent on direct patient counseling and coordination of care  Chief Complaint:   Overdosed on trazodone    History of Present Illness:    Kelsea Kraus is a 38 y o  female who presents with overdosing on trazodone  Then she called EMS as she told that she tried to commit suicide/hurt herself  Information is from the ER physician and records  Patient also is  arousable although still pretty lethargic and keeps going back to sleep  She said that she overdosed on bottle full of medication-trazodone and there was nothing we left  Feeling hopeless and nothing much to do    She admits to overdosing on this to hurt herself  Review of Systems    Because of patient's current condition/lethargic, patient admits to trying to commit suicide by overdosing on trazodone-whole bottle  Otherwise she on said no to other questions-positive as per history of presenting illness  Also other information is limited from her because she keeps going back to sleep      Past Medical and Surgical History:     Major depression  Anxiety disorder        Meds/Allergies:    Prior to Admission medications    Medication Sig Start Date End Date Taking? Authorizing Provider   ARIPiprazole (ABILIFY) 2 mg tablet Take 2 mg by mouth daily    Historical Provider, MD   DULoxetine (CYMBALTA) 30 mg delayed release capsule Take 30 mg by mouth Medrol Dose Pack scheduling ONLY    Historical Provider, MD   DULoxetine (CYMBALTA) 60 mg delayed release capsule Take 60 mg by mouth Medrol Dose Pack scheduling ONLY    Historical Provider, MD   LORazepam (ATIVAN) 0 5 mg tablet Take 0 5 mg by mouth 2 (two) times a day    Historical Provider, MD   traZODone (DESYREL) 150 mg tablet Take 150 mg by mouth daily at bedtime    Historical Provider, MD     Reviewed medications as listed above    Allergies:    Allergies   Allergen Reactions    Oxycodone-Acetaminophen Hives and Itching     vomiting and dizziness    Pentosan Polysulfate Hives and Itching    Pollen Extract     Sulfa Antibiotics Hives, Vomiting, Itching and Rash       Social History:     Marital Status: /Civil Union   Occupation:  Currently not known  Patient Pre-hospital Living Situation:  With Family  Patient Pre-hospital Level of Mobility:  Presumably independent as per family  Patient Pre-hospital Diet Restrictions:  None  Substance Use History:   History   Alcohol Use No     History   Smoking Status    Current Every Day Smoker    Packs/day: 1 00    Types: Cigarettes   Smokeless Tobacco    Never Used     History   Drug Use No       Family History:    Limited information available directly from the patient        Physical Exam      Vitals:   Blood Pressure: 96/54 (10/01/18 1700)  Pulse: 89 (10/01/18 1700)  Temperature: 97 5 °F (36 4 °C) (10/01/18 1426)  Temp Source: Oral (10/01/18 1426)  Respirations: 18 (10/01/18 1700)  Weight - Scale: 81 9 kg (180 lb 8 9 oz) (10/01/18 1422)  SpO2: 98 % (10/01/18 1700)    Vital signs are reviewed as above  Constitutional:  Patient lying in stretcher in the ER  She is sleepy and lethargic  She would wake up briefly and answer questions and then would go back to sleep  Eyes:  Open her eyes briefly  Conjunctivae are normal     HENT: Oropharynx are dry  Did not notice any significant lesions on the tongue  Head normocephalic  Neck: Neck is supple  Other examination is limited  Cardiac: I did not hear any rubs or gallop  Patient appears to be in sinus rhythm  She was tachycardic when she came in  Respiratory: Patient not in significant respiratory distress  Air entry in general is fair although she has poor inspiratory effort because of her current condition  GI: Abdomen is soft  It is grossly nontender  Bowel sounds are adequate  I was not able to appreciate any hepatosplenomegaly  Neurologic:  Patient is awake and alert  Neurological examination is grossly intact  No obvious focal neurological deficit noticed  Skin: Skin is warm and dry  Psychiatric:  Depressed  Musculoskeletal   Moving her arms while in stretcher in the ER  Extremities: Patient has no significant cyanosis, clubbing, or lower extremity edema           Additional Data:     Lab Results: I have personally reviewed pertinent reports          Results from last 7 days  Lab Units 10/01/18  1448   WBC Thousand/uL 8 82   HEMOGLOBIN g/dL 15 1   HEMATOCRIT % 45 0   PLATELETS Thousands/uL 208   NEUTROS PCT % 64   LYMPHS PCT % 26   MONOS PCT % 7   EOS PCT % 1       Results from last 7 days  Lab Units 10/01/18  1448   SODIUM mmol/L 140   POTASSIUM mmol/L 3 2*   CHLORIDE mmol/L 104   CO2 mmol/L 23   BUN mg/dL 8 CREATININE mg/dL 0 96   CALCIUM mg/dL 8 3   ALK PHOS U/L 65   ALT U/L 17   AST U/L 13       Results from last 7 days  Lab Units 10/01/18  1448   INR  1 07         EKG, Pathology, and Other Studies Reviewed on Admission:   · EKG:  Sinus tachy/sinus rhythm  At the moment, QT is within normal limits    [employer] Records Reviewed: No     ** Please Note: Dragon 360 Dictation voice to text software may have been used in the creation of this document   **

## 2018-10-01 NOTE — ED PROVIDER NOTES
History  Chief Complaint   Patient presents with    Overdose - Intentional     pt took approximately fifty 100mg trazodone to commit suicide  lethargic upon arrival, but responsive     Pt  Took an overdose of 50 tablets of trazadone 100mg tabs before she called 911 at around 1:30pm   She admits that this was an attempt to kill herself  C/o feeling sleepy and nauseous - otherwise she has no symptoms  Prior to Admission Medications   Prescriptions Last Dose Informant Patient Reported? Taking? ARIPiprazole (ABILIFY) 2 mg tablet   Yes No   Sig: Take 2 mg by mouth daily   DULoxetine (CYMBALTA) 30 mg delayed release capsule   Yes No   Sig: Take 30 mg by mouth Medrol Dose Pack scheduling ONLY   DULoxetine (CYMBALTA) 60 mg delayed release capsule   Yes No   Sig: Take 60 mg by mouth Medrol Dose Pack scheduling ONLY   LORazepam (ATIVAN) 0 5 mg tablet   Yes No   Sig: Take 0 5 mg by mouth 2 (two) times a day   traZODone (DESYREL) 150 mg tablet   Yes No   Sig: Take 150 mg by mouth daily at bedtime      Facility-Administered Medications: None       History reviewed  No pertinent past medical history  History reviewed  No pertinent surgical history  History reviewed  No pertinent family history  I have reviewed and agree with the history as documented  Social History   Substance Use Topics    Smoking status: Current Every Day Smoker     Packs/day: 1 00     Types: Cigarettes    Smokeless tobacco: Never Used    Alcohol use No        Review of Systems   Constitutional: Negative for appetite change, fatigue and fever  HENT: Negative for sore throat  Respiratory: Negative for cough, shortness of breath and wheezing  Cardiovascular: Negative for chest pain and leg swelling  Gastrointestinal: Positive for nausea  Negative for abdominal pain, diarrhea and vomiting  Genitourinary: Negative for dysuria and flank pain  Musculoskeletal: Negative for back pain and neck pain     Skin: Negative for rash    Neurological: Negative for syncope and headaches  Psychiatric/Behavioral:        +SI - this was an attempt to overdose to kill herself  Physical Exam  Physical Exam   Constitutional: She appears well-developed and well-nourished  HENT:   Head: Normocephalic and atraumatic  Mouth/Throat: Oropharynx is clear and moist    Eyes: Pupils are equal, round, and reactive to light  Pupils are 2mm and reactive   Neck: Normal range of motion  Neck supple  Cardiovascular: Normal rate and regular rhythm  Pulmonary/Chest: Effort normal and breath sounds normal    Abdominal: Soft  There is no tenderness  Musculoskeletal: Normal range of motion  Neurological:   Sleepy, opens eyes to commands and answers appropriately  Skin: Skin is warm and dry  Psychiatric:   +SI with an attempt to kill herself by overdose  Nursing note and vitals reviewed        Vital Signs  ED Triage Vitals   Temperature Pulse Respirations Blood Pressure SpO2   10/01/18 1426 10/01/18 1422 10/01/18 1422 10/01/18 1422 10/01/18 1422   97 5 °F (36 4 °C) (!) 125 14 122/66 95 %      Temp Source Heart Rate Source Patient Position - Orthostatic VS BP Location FiO2 (%)   10/01/18 1426 10/01/18 1422 10/01/18 1422 10/01/18 1422 --   Oral Monitor Lying Right arm       Pain Score       10/01/18 1422       No Pain           Vitals:    10/01/18 1645 10/01/18 1700 10/01/18 1745 10/01/18 1800   BP: 96/61 96/54 105/58 98/54   Pulse: 86 89 88 79   Patient Position - Orthostatic VS: Lying Lying Lying Lying       Visual Acuity  Visual Acuity      Most Recent Value   L Pupil Size (mm)  3   R Pupil Size (mm)  3          ED Medications  Medications   potassium chloride 20 mEq IVPB (premix) (20 mEq Intravenous New Bag 10/1/18 1556)   sodium chloride 0 9 % bolus 1,000 mL (not administered)   sodium chloride 0 9 % bolus 1,000 mL (0 mL Intravenous Stopped 10/1/18 1556)   ondansetron (ZOFRAN) injection 4 mg (4 mg Intravenous Given 10/1/18 1452) magnesium sulfate IVPB (premix) SOLN 1 g (1 g Intravenous New Bag 10/1/18 9685)   sodium chloride 0 9 % bolus 1,000 mL (1,000 mL Intravenous New Bag 10/1/18 8297)       Diagnostic Studies  Results Reviewed     Procedure Component Value Units Date/Time    Rapid drug screen, urine [[id number]]  (Normal) Collected:  10/01/18 1753    Lab Status:  Final result Specimen:  Urine from Urine, Clean Catch Updated:  10/01/18 1815     Amph/Meth UR Negative     Barbiturate Ur Negative     Benzodiazepine Urine Negative     Cocaine Urine Negative     Methadone Urine Negative     Opiate Urine Negative     PCP Ur Negative     THC Urine Negative    Narrative:         FOR MEDICAL PURPOSES ONLY  IF CONFIRMATION NEEDED PLEASE CONTACT THE LAB WITHIN 5 DAYS      Drug Screen Cutoff Levels:  AMPHETAMINE/METHAMPHETAMINES  1000 ng/mL  BARBITURATES     200 ng/mL  BENZODIAZEPINES     200 ng/mL  COCAINE      300 ng/mL  METHADONE      300 ng/mL  OPIATES      300 ng/mL  PHENCYCLIDINE     25 ng/mL  THC       50 ng/mL    UA w Reflex to Microscopic [[id number]] Collected:  10/01/18 1753    Lab Status:  Final result Specimen:  Urine from Urine, Clean Catch Updated:  10/01/18 1804     Color, UA Yellow     Clarity, UA Clear     Specific Gravity, UA 1 025     pH, UA 6 0     Leukocytes, UA Negative     Nitrite, UA Negative     Protein, UA Negative mg/dl      Glucose, UA Negative mg/dl      Ketones, UA Negative mg/dl      Urobilinogen, UA 0 2 E U /dl      Bilirubin, UA Negative     Blood, UA Negative    POCT pregnancy, urine [[id number]]  (Normal) Resulted:  10/01/18 1801    Lab Status:  Final result Updated:  10/01/18 1801     EXT PREG TEST UR (Ref: Negative) negative    Magnesium [[id number]]  (Normal) Collected:  10/01/18 1522    Lab Status:  Final result Specimen:  Blood from Arm, Left Updated:  10/01/18 1607     Magnesium 1 6 mg/dL     Acetaminophen level [[id number]]  (Abnormal) Collected:  10/01/18 1448    Lab Status:  Final result Specimen:  Blood from Arm, Left Updated:  10/01/18 1548     Acetaminophen Level <2 0 (L) ug/mL     Salicylate level [[id number]]  (Normal) Collected:  10/01/18 1522    Lab Status:  Final result Specimen:  Blood from Arm, Left Updated:  33/65/47 5995     Salicylate Lvl 3 6 mg/dL     Ethanol [[id number]]  (Normal) Collected:  10/01/18 1448    Lab Status:  Final result Specimen:  Blood from Arm, Left Updated:  10/01/18 1528     Ethanol Lvl <3 mg/dL     TSH [[id number]]  (Normal) Collected:  10/01/18 1448    Lab Status:  Final result Specimen:  Blood from Arm, Left Updated:  10/01/18 1521     TSH 3RD GENERATON 1 809 uIU/mL     Narrative:         Patients undergoing fluorescein dye angiography may retain small amounts of fluorescein in the body for 48-72 hours post procedure  Samples containing fluorescein can produce falsely depressed TSH values  If the patient had this procedure,a specimen should be resubmitted post fluorescein clearance            The recommended reference ranges for TSH during pregnancy are as follows:  First trimester 0 1 to 2 5 uIU/mL  Second trimester  0 2 to 3 0 uIU/mL  Third trimester 0 3 to 3 0 uIU/m      Lipase [[id number]]  (Normal) Collected:  10/01/18 1448    Lab Status:  Final result Specimen:  Blood from Arm, Left Updated:  10/01/18 1521     Lipase 94 u/L     Protime-INR [[id number]]  (Normal) Collected:  10/01/18 1448    Lab Status:  Final result Specimen:  Blood from Arm, Left Updated:  10/01/18 1515     Protime 13 8 seconds      INR 1 07    APTT [[id number]]  (Abnormal) Collected:  10/01/18 1448    Lab Status:  Final result Specimen:  Blood from Arm, Left Updated:  10/01/18 1515     PTT 23 (L) seconds     Troponin I [[id number]]  (Normal) Collected:  10/01/18 1448    Lab Status:  Final result Specimen:  Blood from Arm, Left Updated:  10/01/18 1514     Troponin I <0 02 ng/mL     Comprehensive metabolic panel [[id number]]  (Abnormal) Collected:  10/01/18 1448    Lab Status:  Final result Specimen:  Blood from Arm, Left Updated:  10/01/18 1512     Sodium 140 mmol/L      Potassium 3 2 (L) mmol/L      Chloride 104 mmol/L      CO2 23 mmol/L      ANION GAP 13 mmol/L      BUN 8 mg/dL      Creatinine 0 96 mg/dL      Glucose 155 (H) mg/dL      Calcium 8 3 mg/dL      AST 13 U/L      ALT 17 U/L      Alkaline Phosphatase 65 U/L      Total Protein 6 9 g/dL      Albumin 3 3 (L) g/dL      Total Bilirubin 0 40 mg/dL      eGFR 72 ml/min/1 73sq m     Narrative:         National Kidney Disease Education Program recommendations are as follows:  GFR calculation is accurate only with a steady state creatinine  Chronic Kidney disease less than 60 ml/min/1 73 sq  meters  Kidney failure less than 15 ml/min/1 73 sq  meters      CBC and differential [[id number]] Collected:  10/01/18 1448    Lab Status:  Final result Specimen:  Blood from Arm, Left Updated:  10/01/18 1456     WBC 8 82 Thousand/uL      RBC 4 96 Million/uL      Hemoglobin 15 1 g/dL      Hematocrit 45 0 %      MCV 91 fL      MCH 30 4 pg      MCHC 33 6 g/dL      RDW 13 9 %      MPV 10 6 fL      Platelets 338 Thousands/uL      nRBC 0 /100 WBCs      Neutrophils Relative 64 %      Immat GRANS % 1 %      Lymphocytes Relative 26 %      Monocytes Relative 7 %      Eosinophils Relative 1 %      Basophils Relative 1 %      Neutrophils Absolute 5 75 Thousands/µL      Immature Grans Absolute 0 06 Thousand/uL      Lymphocytes Absolute 2 26 Thousands/µL      Monocytes Absolute 0 58 Thousand/µL      Eosinophils Absolute 0 08 Thousand/µL      Basophils Absolute 0 09 Thousands/µL                  No orders to display              Procedures  ECG 12 Lead Documentation  Date/Time: 10/1/2018 2:33 PM  Performed by: RAZA Stapleton  Authorized by: RAZA Stapleton     Rate:     ECG rate:  136    ECG rate assessment: tachycardic    Rhythm:     Rhythm: sinus tachycardia    ST segments:     ST segments:  Non-specific  Comments:      Normal QRS (76), QT/QTc (318/478)             Phone Contacts  ED Phone Contact    ED Course                               MDM  Number of Diagnoses or Management Options  Overdose:      Amount and/or Complexity of Data Reviewed  Clinical lab tests: ordered and reviewed    Risk of Complications, Morbidity, and/or Mortality  Presenting problems: moderate  General comments: Pt  Is admitted medically to telemetry unit then will be admitted to psych  Toxicology was consulted  CritCare Time    Disposition  Final diagnoses:   Overdose     Time reflects when diagnosis was documented in both MDM as applicable and the Disposition within this note     Time User Action Codes Description Comment    10/1/2018  3:16 PM Quincy Kobs Add [T50 901A] Overdose     10/1/2018  5:55 PM [place], [address] [facility] Suicidal behavior with attempted self-injury ([facility] [address] )     10/1/2018  5:55 PM Josr Byrd 26 Suicidal behavior with attempted self-injury [facility])       ED Disposition     ED Disposition Condition Comment    Admit  Case was discussed with Dr Rhiannon Cross and Dr Taylor Yun and the patient's admission status was agreed to be observation/tele      Follow-up Information    None         Patient's Medications   Discharge Prescriptions    No medications on file     No discharge procedures on file      ED Provider  Electronically Signed by           Marko Bustillos MD  10/01/18 6936

## 2018-10-02 PROBLEM — I95.9 HYPOTENSION: Status: ACTIVE | Noted: 2018-10-02

## 2018-10-02 LAB
ALBUMIN SERPL BCP-MCNC: 2.8 G/DL (ref 3.5–5)
ALP SERPL-CCNC: 40 U/L (ref 46–116)
ALT SERPL W P-5'-P-CCNC: 14 U/L (ref 12–78)
ANION GAP SERPL CALCULATED.3IONS-SCNC: 8 MMOL/L (ref 4–13)
AST SERPL W P-5'-P-CCNC: 7 U/L (ref 5–45)
ATRIAL RATE: 71 BPM
BILIRUB SERPL-MCNC: 0.4 MG/DL (ref 0.2–1)
BUN SERPL-MCNC: 2 MG/DL (ref 5–25)
CALCIUM SERPL-MCNC: 7.2 MG/DL (ref 8.3–10.1)
CHLORIDE SERPL-SCNC: 112 MMOL/L (ref 100–108)
CO2 SERPL-SCNC: 23 MMOL/L (ref 21–32)
CREAT SERPL-MCNC: 0.8 MG/DL (ref 0.6–1.3)
ERYTHROCYTE [DISTWIDTH] IN BLOOD BY AUTOMATED COUNT: 14.5 % (ref 11.6–15.1)
GFR SERPL CREATININE-BSD FRML MDRD: 90 ML/MIN/1.73SQ M
GLUCOSE P FAST SERPL-MCNC: 91 MG/DL (ref 65–99)
GLUCOSE SERPL-MCNC: 91 MG/DL (ref 65–140)
HCT VFR BLD AUTO: 34.7 % (ref 34.8–46.1)
HGB BLD-MCNC: 11.6 G/DL (ref 11.5–15.4)
MCH RBC QN AUTO: 31.1 PG (ref 26.8–34.3)
MCHC RBC AUTO-ENTMCNC: 33.4 G/DL (ref 31.4–37.4)
MCV RBC AUTO: 93 FL (ref 82–98)
P AXIS: 15 DEGREES
PLATELET # BLD AUTO: 153 THOUSANDS/UL (ref 149–390)
PMV BLD AUTO: 10.9 FL (ref 8.9–12.7)
POTASSIUM SERPL-SCNC: 4.2 MMOL/L (ref 3.5–5.3)
PR INTERVAL: 132 MS
PROT SERPL-MCNC: 5.3 G/DL (ref 6.4–8.2)
QRS AXIS: 100 DEGREES
QRSD INTERVAL: 74 MS
QT INTERVAL: 400 MS
QTC INTERVAL: 434 MS
RBC # BLD AUTO: 3.73 MILLION/UL (ref 3.81–5.12)
SODIUM SERPL-SCNC: 143 MMOL/L (ref 136–145)
T WAVE AXIS: 55 DEGREES
TSH SERPL DL<=0.05 MIU/L-ACNC: 1.29 UIU/ML (ref 0.36–3.74)
VENTRICULAR RATE: 71 BPM
WBC # BLD AUTO: 9.42 THOUSAND/UL (ref 4.31–10.16)

## 2018-10-02 PROCEDURE — 85027 COMPLETE CBC AUTOMATED: CPT | Performed by: INTERNAL MEDICINE

## 2018-10-02 PROCEDURE — 82948 REAGENT STRIP/BLOOD GLUCOSE: CPT

## 2018-10-02 PROCEDURE — 93005 ELECTROCARDIOGRAM TRACING: CPT

## 2018-10-02 PROCEDURE — 80053 COMPREHEN METABOLIC PANEL: CPT | Performed by: INTERNAL MEDICINE

## 2018-10-02 PROCEDURE — 84443 ASSAY THYROID STIM HORMONE: CPT | Performed by: INTERNAL MEDICINE

## 2018-10-02 PROCEDURE — 99233 SBSQ HOSP IP/OBS HIGH 50: CPT | Performed by: ANESTHESIOLOGY

## 2018-10-02 PROCEDURE — 99255 IP/OBS CONSLTJ NEW/EST HI 80: CPT | Performed by: PSYCHIATRY & NEUROLOGY

## 2018-10-02 PROCEDURE — 93010 ELECTROCARDIOGRAM REPORT: CPT | Performed by: INTERNAL MEDICINE

## 2018-10-02 RX ORDER — SODIUM CHLORIDE, SODIUM GLUCONATE, SODIUM ACETATE, POTASSIUM CHLORIDE, MAGNESIUM CHLORIDE, SODIUM PHOSPHATE, DIBASIC, AND POTASSIUM PHOSPHATE .53; .5; .37; .037; .03; .012; .00082 G/100ML; G/100ML; G/100ML; G/100ML; G/100ML; G/100ML; G/100ML
250 INJECTION, SOLUTION INTRAVENOUS CONTINUOUS
Status: DISCONTINUED | OUTPATIENT
Start: 2018-10-02 | End: 2018-10-02

## 2018-10-02 RX ORDER — ALBUMIN, HUMAN INJ 5% 5 %
25 SOLUTION INTRAVENOUS ONCE
Status: COMPLETED | OUTPATIENT
Start: 2018-10-02 | End: 2018-10-02

## 2018-10-02 RX ORDER — MAGNESIUM SULFATE HEPTAHYDRATE 40 MG/ML
2 INJECTION, SOLUTION INTRAVENOUS ONCE
Status: COMPLETED | OUTPATIENT
Start: 2018-10-02 | End: 2018-10-02

## 2018-10-02 RX ORDER — SODIUM CHLORIDE, SODIUM GLUCONATE, SODIUM ACETATE, POTASSIUM CHLORIDE, MAGNESIUM CHLORIDE, SODIUM PHOSPHATE, DIBASIC, AND POTASSIUM PHOSPHATE .53; .5; .37; .037; .03; .012; .00082 G/100ML; G/100ML; G/100ML; G/100ML; G/100ML; G/100ML; G/100ML
100 INJECTION, SOLUTION INTRAVENOUS CONTINUOUS
Status: DISCONTINUED | OUTPATIENT
Start: 2018-10-02 | End: 2018-10-02

## 2018-10-02 RX ADMIN — ALBUMIN HUMAN 25 G: 0.05 INJECTION, SOLUTION INTRAVENOUS at 02:19

## 2018-10-02 RX ADMIN — SODIUM CHLORIDE 1000 ML: 0.9 INJECTION, SOLUTION INTRAVENOUS at 00:37

## 2018-10-02 RX ADMIN — SODIUM CHLORIDE, SODIUM GLUCONATE, SODIUM ACETATE, POTASSIUM CHLORIDE, MAGNESIUM CHLORIDE, SODIUM PHOSPHATE, DIBASIC, AND POTASSIUM PHOSPHATE 250 ML/HR: .53; .5; .37; .037; .03; .012; .00082 INJECTION, SOLUTION INTRAVENOUS at 02:14

## 2018-10-02 RX ADMIN — SODIUM CHLORIDE, SODIUM GLUCONATE, SODIUM ACETATE, POTASSIUM CHLORIDE, MAGNESIUM CHLORIDE, SODIUM PHOSPHATE, DIBASIC, AND POTASSIUM PHOSPHATE 100 ML/HR: .53; .5; .37; .037; .03; .012; .00082 INJECTION, SOLUTION INTRAVENOUS at 18:11

## 2018-10-02 RX ADMIN — MAGNESIUM SULFATE HEPTAHYDRATE 2 G: 40 INJECTION, SOLUTION INTRAVENOUS at 02:07

## 2018-10-02 RX ADMIN — ENOXAPARIN SODIUM 40 MG: 40 INJECTION SUBCUTANEOUS at 09:39

## 2018-10-02 RX ADMIN — NICOTINE 1 PATCH: 21 PATCH TRANSDERMAL at 09:39

## 2018-10-02 RX ADMIN — SODIUM CHLORIDE, SODIUM GLUCONATE, SODIUM ACETATE, POTASSIUM CHLORIDE, MAGNESIUM CHLORIDE, SODIUM PHOSPHATE, DIBASIC, AND POTASSIUM PHOSPHATE 250 ML/HR: .53; .5; .37; .037; .03; .012; .00082 INJECTION, SOLUTION INTRAVENOUS at 09:35

## 2018-10-02 RX ADMIN — SODIUM CHLORIDE, SODIUM GLUCONATE, SODIUM ACETATE, POTASSIUM CHLORIDE, MAGNESIUM CHLORIDE, SODIUM PHOSPHATE, DIBASIC, AND POTASSIUM PHOSPHATE 250 ML/HR: .53; .5; .37; .037; .03; .012; .00082 INJECTION, SOLUTION INTRAVENOUS at 14:03

## 2018-10-02 NOTE — PROGRESS NOTES
SHORT PROGRESS NOTE  · RN reported hypotensive episodes overnight  Patient has not responded despite getting 4 L normal saline bolus, and also increased fluid rate to 250 mL  · Patient is awake arousable but lethargic  · Discussed with ICU team -- ICU team at bedside, patient will be transferred to ICU in view of persistent hypotension episodes

## 2018-10-02 NOTE — SOCIAL WORK
Clem Keller consulted Complex CM to follow pt  Complex CM s/w ICU AP who reported pt is hypotensive and she will have psych eval today  Pt is anticipated to dc tomorrow pending progress

## 2018-10-02 NOTE — CASE MANAGEMENT
Thank you,  Kayleen Larsenn  Utilization Review Department  Phone: [phone]; Fax [phone]  ATTENTION: Please call with any questions or concerns to [phone]  and carefully follow the prompts so that you are directed to the right person  Send all requests for admission clinical reviews, approved or denied determinations and any other requests to fax [phone]  All voicemails are confidential      Initial Clinical Review    Admission: Date/Time/Statement:OBSERVATION 10/1/18 @1744 CONVERTED TO INPATIENT ADMISSION 10/2/18 @0941 DUE TO HYPOTENSION DESPITE MULTIPLE IVF BOLUSES, FOLLOWING INTENTIONAL 5000MG TRAZODONE OVERDOSE, TRANSFERRED TO ICU  10/02/18 0941  Inpatient Admission Once     Transfer Service: Critical Care/ICU       Question Answer Comment   Admitting Physician ELISEO CONNOR    Level of Care Level 1 Stepdown    Estimated length of stay More than 2 Midnights    Certification I certify that inpatient services are medically necessary for this patient for a duration of greater than two midnights  See H&P and MD Progress Notes for additional information about the patient's course of treatment  10/02/18 0941     ED: Date/Time/Mode of Arrival:   ED Arrival Information     Expected Arrival Acuity Means of Arrival Escorted By Service Admission Type    - 10/1/2018 14:19 Emergent Ambulance [address] [facility] Critical Care/ICU Emergency    Arrival Complaint    OD          Chief Complaint:   Chief Complaint   Patient presents with    Overdose - Intentional     pt took approximately fifty 100mg trazodone to commit suicide  lethargic upon arrival, but responsive       History of Illness: 37 yo fem to ED from home, called 911 at 1330 then took overdose of 50 trazodone tabs, total 5000mg  Trying to commit suicide  Feels sleepy and nauseous  Became somnolent 90 minutes after ingestion  Primary sx of trazodone OD is CNS depression   Bradycardia and hypotension could occur in large overdose with prolonged QT interval, with risk of torsades  Also possibility of serotonin syndrome       ED Vital Signs:   ED Triage Vitals   Temperature Pulse Respirations Blood Pressure SpO2   10/01/18 1426 10/01/18 1422 10/01/18 1422 10/01/18 1422 10/01/18 1422   97 5 °F (36 4 °C) (!) 125 14 122/66 95 %      Temp Source Heart Rate Source Patient Position - Orthostatic VS BP Location FiO2 (%)   10/01/18 1426 10/01/18 1422 10/01/18 1422 10/01/18 1422 --   Oral Monitor Lying Right arm       Pain Score       10/01/18 1422       No Pain        Wt Readings from Last 1 Encounters:   10/01/18 82 kg (180 lb 12 4 oz)       Vital Signs (abnormal): Vital Signs (last 2 days)     Date/Time  Temp Pulse Resp BP MAP (mmHg) SpO2 O2 Device   10/02/18 0345  -- 80 21  88/54 63 97 % --   10/02/18 0330  -- 78  23  82/52 62 98 % --   10/02/18 0300  -- 77 19  86/52 63 98 % --   10/02/18 0253  -- 73  23  85/53 65 98 % Nasal cannula   10/02/18 0200  -- 76 20 90/55 68 95 % Nasal cannula   10/02/18 0130  -- 75 --  85/54 -- -- --   10/02/18 0115  -- 78 --  83/53 -- -- --   10/02/18 0100  -- 74 15  80/48 -- 97 % Nasal cannula   10/02/18 0045  -- 77 16  79/46 -- 96 % Nasal cannula   10/02/18 0031  -- 70 16  77/46 -- 96 % Nasal cannula   10/02/18 0016  -- 68 16  77/44 -- 96 % Nasal cannula   10/02/18 0002  -- 75 --  76/42 -- 99 % --   10/01/18 2346  -- 72 --  87/53 -- 96 % Nasal cannula   10/01/18 2312  -- 68 --  78/48 -- 97 % Nasal cannula   10/01/18 2310  97 5 °F (36 4 °C) 66 18  82/46 -- 95 % None (Room air)     Abnormal Labs/Diagnostic Test Results:   k 3 2, 4 2   Gluc 155, 91    Alb 3 3, 2 8   Cl 104, 112   Ca 8 3, 7 2    Alk phos 40   t prot 5 3   hct 34 7  ptt 23   Acet<2    etoh<3  UA neg  UDS neg  EKG Sinus tachycardia  Possible Left atrial enlargement  Low voltage QRS    ED Treatment:   Medication Administration from 10/01/2018 1419 to 10/01/2018 1940       Date/Time Order Dose Route Action Action by Comments     10/01/2018 9122 sodium chloride 0 9 % bolus 1,000 mL 1,000 mL Intravenous [address] Ashok Chinchilla, [address]      10/01/2018 1452 ondansetron [facility]) injection 4 mg 4 mg Intravenous Given Antwan Shannon RN      10/01/2018 1836 potassium chloride 20 mEq IVPB (premix) 20 mEq Intravenous [address] Eladio Covarrubias RN      10/01/2018 1556 potassium chloride 20 mEq IVPB (premix) 20 mEq Intravenous [address] Eladio Covarrubias RN      10/01/2018 1736 magnesium sulfate IVPB (premix) SOLN 1 g 1 g Intravenous [address] Eladio Covarrubias RN      10/01/2018 1658 sodium chloride 0 9 % bolus 1,000 mL 1,000 mL Intravenous New Bag Eladio Covarrubias RN         Past Medical/Surgical History: Active Ambulatory Problems     Diagnosis Date Noted    Major depressive disorder, recurrent severe without psychotic features ([facility] [address] ) 01/24/2018    Generalized anxiety disorder 01/24/2018   Admitting Diagnosis: Overdose [T50 901A]  Suicidal behavior with attempted self-injury ([facility] [address] ) Daryl Alfaro    Age/Sex: 38 y o  female    Assessment/Plan: 37 yo fem to ED from home admitted to med surg, due to intentional trazodone overdose, then transferred to ICU stepdown after hypotensive episodes despite 5li total crystalloids, with 3liters given in the preceiding 3 hrs prior to transfer, then increased rate to 250/hr  bp was 76/42  Medical toxicology consulted  IVF, tele, check tylenol and salicylate levels  Optimize lytes-give mg and correct hypokalemia  Will need IVF for hypotension and bradycardia, and consider vasopressors as needed  Hold benzos and all home meds       Admission Orders:  Scheduled Meds:   Current Facility-Administered Medications:  enoxaparin 40 mg Subcutaneous Q24H [facility] & long-term   multi-electrolyte 250 mL/hr Intravenous Continuous   nicotine 1 patch Transdermal Daily   ondansetron 4 mg Intravenous Q6H PRN   sodium chloride 1,000 mL Intravenous Q4H PRN  X 3, 10/1 @1954, 2341, 10/2 @0037   NS +40K 250/hr x 1 li, then DC  IV mg x 1  Activity as jose  Tele  I/O  Ambulate q shift  NPO  Cons psych  Cons toxicology  Cons case mgmt

## 2018-10-02 NOTE — PROGRESS NOTES
Accept/Transfer Note - Critical Care   Pablo Salomon 38 y o  female MRN: [id number]  Unit/Bed#:  Encounter: [id number]    Reason for Admission / Chief Complaint:  Trazodone overdose, hypotension    History of Present Illness:  Pablo Salomon is a 38 y o  female who was BIBA to [facility] on 10/01 after ingesting between 50 and 150 mg trazodone tablets  She has a past medical history of major depressive disorder, generalized anxiety disorder  Patient called EMS after overdose arrived about 90 minutes afterwards  When she arrived, she was tachycardic and hypotensive  She was responsive to fluids  EKG was obtained and showed a heart rate of 136, QRS is 76, and QTC of 478  He was given fluids, blood pressure was within normal range, so she was admitted to the medical-surgical floor  Overnight, patient became more hypotensive with blood pressures as low as 76 over 42  She received a total of 5 L of crystalloid and 3 L in the preceding 3 hours prior to transfer without significant improvement in blood pressure, so she was referred for step-down admission  Upon my evaluation the patient, she is arousable by voice, able answer simple questions, does not complain of dizziness, lightheadedness, or feeling any worse than when she arrived  History obtained from chart review, patient's , and the patient  Past Medical History:  History reviewed  No pertinent past medical history  Past Surgical History:  History reviewed  No pertinent surgical history  Past Family History:  History reviewed  No pertinent family history      Social History:  History   Smoking Status    Current Every Day Smoker    Packs/day: 1 00    Types: Cigarettes   Smokeless Tobacco    Never Used     History   Alcohol Use No     History   Drug Use No     Marital Status: /Civil Union  Exercise History:  Independent ADLs    Medications:  Current Facility-Administered Medications   Medication Dose Route Frequency    albumin human (FLEXBUMIN) 5 % injection 25 g  25 g Intravenous Once    enoxaparin (LOVENOX) subcutaneous injection 40 mg  40 mg Subcutaneous Q24H HERMELINDO    magnesium sulfate 2 g/50 mL IVPB (premix) 2 g  2 g Intravenous Once    multi-electrolyte (ISOLYTE-S PH 7 4 equivalent) IV solution  250 mL/hr Intravenous Continuous    nicotine (NICODERM CQ) 21 mg/24 hr TD 24 hr patch 1 patch  1 patch Transdermal Daily    ondansetron (ZOFRAN) injection 4 mg  4 mg Intravenous Q6H PRN    sodium chloride 0 9 % bolus 1,000 mL  1,000 mL Intravenous Q4H PRN     Home medications:  Prior to Admission medications    Medication Sig Start Date End Date Taking? Authorizing Provider   DULoxetine (CYMBALTA) 30 mg delayed release capsule Take 30 mg by mouth Medrol Dose Pack scheduling ONLY   Yes Historical Provider, MD   DULoxetine (CYMBALTA) 60 mg delayed release capsule Take 60 mg by mouth Medrol Dose Pack scheduling ONLY   Yes Historical Provider, MD   traZODone (DESYREL) 150 mg tablet Take 150 mg by mouth daily at bedtime   Yes Historical Provider, MD   LORazepam (ATIVAN) 0 5 mg tablet Take 0 5 mg by mouth 2 (two) times a day    Historical Provider, MD     Allergies: Allergies   Allergen Reactions    Oxycodone-Acetaminophen Hives and Itching     vomiting and dizziness    Pentosan Polysulfate Hives and Itching    Pollen Extract     Sulfa Antibiotics Hives, Vomiting, Itching and Rash       ROS:   Review of Systems   Constitutional: Positive for fatigue  All other systems reviewed and are negative        Vitals:  Vitals:    10/02/18 0045 10/02/18 0100 10/02/18 0115 10/02/18 0130   BP: (!) 79/46 (!) 80/48 (!) 83/53 (!) 85/54   BP Location: Right arm Right arm Right arm Right arm   Pulse: 77 74 78 75   Resp: 16 15     Temp:       TempSrc:       SpO2: 96% 97%     Weight:       Height:         Temperature:   Temp (24hrs), Av 9 °F (36 6 °C), Min:97 5 °F (36 4 °C), Max:98 6 °F (37 °C)    Current Temperature: 97 5 °F (36 4 °C)    Weights:   IBW: 57 kg  Body mass index is 30 08 kg/m²  Hemodynamic Monitoring:  N/A     Non-Invasive/Invasive Ventilation Settings:  Respiratory    Lab Data (Last 4 hours)    None         O2/Vent Data (Last 4 hours)    None              No results found for: PHART, GPW3ENE, PO2ART, VHQ7RBH, V2WXKXWN, BEART, SOURCE  SpO2: SpO2: 97 %, SpO2 Activity: SpO2 Activity: At Rest, SpO2 Device: O2 Device: Nasal cannula     Physical Exam:  Physical Exam   Constitutional: She is sleeping  She is easily aroused  No distress  Somnolent but arousable   HENT:   Head: Normocephalic and atraumatic  Mouth/Throat: Uvula is midline  Mucous membranes are dry  Eyes: Pupils are equal, round, and reactive to light  2-3 mm, equal round and reactive bilaterally   Neck: Trachea normal and phonation normal  No JVD present  Cardiovascular: Normal rate, regular rhythm, S1 normal and S2 normal    No extrasystoles are present  Pulses:       Radial pulses are 1+ on the right side, and 1+ on the left side  Pulmonary/Chest: Effort normal and breath sounds normal  She has no rales  Abdominal: Soft  Bowel sounds are normal  She exhibits no distension  There is no tenderness  Genitourinary:   Genitourinary Comments: Deferred   Musculoskeletal: Normal range of motion  She exhibits no edema  Neurological: She is easily aroused  She has normal strength  She is disoriented (Disoriented to time)  GCS eye subscore is 3  GCS verbal subscore is 5  GCS motor subscore is 6  Skin: Skin is warm and dry  Nursing note and vitals reviewed        Labs:    Results from last 7 days  Lab Units 10/01/18  1448   WBC Thousand/uL 8 82   HEMOGLOBIN g/dL 15 1   HEMATOCRIT % 45 0   PLATELETS Thousands/uL 208   NEUTROS PCT % 64   MONOS PCT % 7       Results from last 7 days  Lab Units 10/01/18  1448   SODIUM mmol/L 140   POTASSIUM mmol/L 3 2*   CHLORIDE mmol/L 104   CO2 mmol/L 23   BUN mg/dL 8   CREATININE mg/dL 0 96   CALCIUM mg/dL 8 3 ALBUMIN g/dL 3 3*   ALK PHOS U/L 65   ALT U/L 17   AST U/L 13       Results from last 7 days  Lab Units 10/01/18  1522   MAGNESIUM mg/dL 1 6        Results from last 7 days  Lab Units 10/01/18  1448   INR  1 07   PTT seconds 23*       Results from last 7 days  Lab Units 10/01/18  1448   TROPONIN I ng/mL <0 02         ABG:No results found for: PHART, PIV2GQO, PO2ART, IKM1AOV, C8ACWDHP, BEART, SOURCE  VBG:      Imaging:  No imaging  EKG: This was personally reviewed by myself  Most recent EKG obtained on arrival to ICU showing rate of 71 beats per minute, QRS 74,   Micro:  No results found for: Tonye Rein, Napolean Quince    ______________________________________________________________________    Assessment:   Principal Problem:    Intentional drug overdose ([facility] [address] )  Active Problems:    Major depressive disorder, recurrent severe without psychotic features ([facility] [address] )    Generalized anxiety disorder    Suicidal behavior with attempted self-injury ([facility] [address] )    Hypokalemia    Hypotension  Resolved Problems:    * No resolved hospital problems  *    Plan:    Neuro:   Intentional drug overdose  - patient reported taking at least 50 150 mg trazodone tablets  - presented to emergency department approximately 90 minutes after ingestion after self reporting to EMS  - initially, tachycardic and hypotensive, but was responsive to fluids  - neuro status remains arousable by voice, able to follow commands  Protecting airway  - frequent neuro checks  - 1-1 with continuous observation  - psych eval in a m      History of generalized anxiety disorder, major depressive disorder  - see above  - psych evaluation today    Regulate sleep/wake cycle    CV:   Tachycardia  - resolved    Hypotension  - transiently responsive to fluids on floor  - transferred to step-down unit for closer monitoring of blood pressure more aggressive fluid resuscitation  - after L bolus on floor, blood pressure now with systolics in the 41F  - I will the patient 500 cc of albumin and monitor blood pressure Q 15 minutes  - if hypotension persists, may need to add vasopressor  - will consider A-line of hypotension persists    Continue Q 4 hour EKGs to monitor QRS and QTC, currently within normal range    Cardiac infusions:  None  Rhythm: NSR  Follow rhythm on telemetry    Pulm:   No issues  Protecting airway  Encourage deep breathing/coughing/IS/PulmToileting   Wean O2 for sats > 92%  GI:   No issues  Nutrition/diet plan:  NPO secondary to somnolence  Stress ulcer prophylaxis: No prophylaxis needed    :   Renal indices within normal limits, follow up a m  Level  Indwelling Walker present: no       FEN:   Will empirically give 2 g of magnesium    Fluid/Diuretic plan: Needs volume Isolate bolus  Goal 24 hour fluid balance:  Resuscitation  Electrolytes repleted:  Pending  Goal: K >4 0, Mag >2 0, and Phos >3 0    ID:   No signs or symptoms concerning for infection    Trend temps and WBC count    Heme:   No issues  Trend hgb and plts  Transfuse as needed for goal hgb > 7    Endo:   No history of diabetes  TSH within normal limits    Msk/Skin:  Mobility goal:  Out of bed once blood pressure profile improves  PT consult: yes  OT consult: yes  Frequent turning and off-loading    Family:  Family updated within 24 hours: yes, confirmed story of H&P with patient's   Provided update on phone, and extensive update once he arrived at hospital     Family meeting planned today: no     Lines:  Peripheral IVs    VTE Prophylaxis:  Pharmacologic Prophylaxis: Enoxaparin (Lovenox)  Mechanical Prophylaxis: sequential compression device    Disposition: Transfer to Stepdown    Code Status: Level 1 - Full Code    Counseling / Coordination of Care  Total time spent today 42 minutes  ______________________________________________________________________        Invasive lines and devices:   Invasive Devices     Peripheral Intravenous Line            Peripheral IV 10/01/18 Left Antecubital 1 day    Peripheral IV 10/01/18 Right Antecubital less than 1 day                Given critical illness, patient length of stay will require greater than two midnights  Portions of the record may have been created with voice recognition software  Occasional wrong word or "sound a like" substitutions may have occurred due to the inherent limitations of voice recognition software  Read the chart carefully and recognize, using context, where substitutions have occurred        Abelardo Long PA-C
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
## 2018-10-02 NOTE — PROGRESS NOTES
Saw pt in ED  Reviewed toxicology note  On exam sleeping but easily arousable, non-focal neuro exam   One episode of self resolving mild hypoTN  Tachycardia resolved with fluids  Normal QRS,QTc on EKG  Admit to telemetry and consult CCM for any changes in status       Discussed witH Dr Trey Leigh and Dr Cyndi Hall, DO

## 2018-10-02 NOTE — PLAN OF CARE
COPING     Pt/Family able to verbalize concerns and demonstrate effective coping strategies Not Progressing     Will report anxiety at manageable levels Not Progressing        Depression - IP adult     Effects of depression will be minimized Not Progressing        DISCHARGE PLANNING     Discharge to home or other facility with appropriate resources Not Progressing        Knowledge Deficit     Patient/family/caregiver demonstrates understanding of disease process, treatment plan, medications, and discharge instructions Not Progressing        SELF HARM     Effect of psychiatric condition will be minimized and patient will be protected from self harm Not Progressing          INFECTION - ADULT     Absence or prevention of progression during hospitalization Progressing     Absence of fever/infection during neutropenic period Progressing        METABOLIC, FLUID AND ELECTROLYTES - ADULT     Electrolytes maintained within normal limits Progressing     Fluid balance maintained Progressing        Prexisting or High Potential for Compromised Skin Integrity     Skin integrity is maintained or improved Progressing        SAFETY ADULT     Patient will remain free of falls Progressing     Maintain or return to baseline ADL function Progressing     Maintain or return mobility status to optimal level Progressing

## 2018-10-03 ENCOUNTER — HOSPITAL ENCOUNTER (INPATIENT)
Facility: HOSPITAL | Age: 43
LOS: 7 days | Discharge: HOME | DRG: 751 | End: 2018-10-10
Attending: PSYCHIATRY & NEUROLOGY | Admitting: PSYCHIATRY & NEUROLOGY
Payer: COMMERCIAL

## 2018-10-03 VITALS
RESPIRATION RATE: 18 BRPM | SYSTOLIC BLOOD PRESSURE: 130 MMHG | BODY MASS INDEX: 30.12 KG/M2 | TEMPERATURE: 98.5 F | HEIGHT: 65 IN | DIASTOLIC BLOOD PRESSURE: 77 MMHG | HEART RATE: 81 BPM | OXYGEN SATURATION: 92 % | WEIGHT: 180.78 LBS

## 2018-10-03 DIAGNOSIS — F17.200 TOBACCO DEPENDENCY: ICD-10-CM

## 2018-10-03 DIAGNOSIS — Z00.00 PHYSICAL EXAM: ICD-10-CM

## 2018-10-03 DIAGNOSIS — F33.2 MAJOR DEPRESSIVE DISORDER, RECURRENT SEVERE WITHOUT PSYCHOTIC FEATURES (HCC): Primary | ICD-10-CM

## 2018-10-03 PROBLEM — I95.9 HYPOTENSION: Status: RESOLVED | Noted: 2018-10-02 | Resolved: 2018-10-03

## 2018-10-03 PROBLEM — E87.6 HYPOKALEMIA: Status: RESOLVED | Noted: 2018-10-01 | Resolved: 2018-10-03

## 2018-10-03 LAB
GLUCOSE SERPL-MCNC: 132 MG/DL (ref 65–140)
GLUCOSE SERPL-MCNC: 88 MG/DL (ref 65–140)

## 2018-10-03 PROCEDURE — 82948 REAGENT STRIP/BLOOD GLUCOSE: CPT

## 2018-10-03 PROCEDURE — 99238 HOSP IP/OBS DSCHRG MGMT 30/<: CPT | Performed by: ANESTHESIOLOGY

## 2018-10-03 RX ORDER — TRAZODONE HYDROCHLORIDE 50 MG/1
50 TABLET ORAL
Status: DISCONTINUED | OUTPATIENT
Start: 2018-10-03 | End: 2018-10-05

## 2018-10-03 RX ORDER — HALOPERIDOL 5 MG
5 TABLET ORAL EVERY 6 HOURS PRN
Status: DISCONTINUED | OUTPATIENT
Start: 2018-10-03 | End: 2018-10-10 | Stop reason: HOSPADM

## 2018-10-03 RX ORDER — TRAMADOL HYDROCHLORIDE 50 MG/1
50 TABLET ORAL EVERY 6 HOURS PRN
Status: DISCONTINUED | OUTPATIENT
Start: 2018-10-03 | End: 2018-10-10 | Stop reason: HOSPADM

## 2018-10-03 RX ORDER — IBUPROFEN 600 MG/1
600 TABLET ORAL EVERY 8 HOURS PRN
Status: CANCELLED | OUTPATIENT
Start: 2018-10-03

## 2018-10-03 RX ORDER — HALOPERIDOL 5 MG
5 TABLET ORAL EVERY 6 HOURS PRN
Status: CANCELLED | OUTPATIENT
Start: 2018-10-03

## 2018-10-03 RX ORDER — BENZTROPINE MESYLATE 1 MG/ML
1 INJECTION INTRAMUSCULAR; INTRAVENOUS EVERY 6 HOURS PRN
Status: DISCONTINUED | OUTPATIENT
Start: 2018-10-03 | End: 2018-10-10 | Stop reason: HOSPADM

## 2018-10-03 RX ORDER — IBUPROFEN 600 MG/1
600 TABLET ORAL EVERY 8 HOURS PRN
Status: DISCONTINUED | OUTPATIENT
Start: 2018-10-03 | End: 2018-10-10 | Stop reason: HOSPADM

## 2018-10-03 RX ORDER — TRAZODONE HYDROCHLORIDE 50 MG/1
50 TABLET ORAL
Status: CANCELLED | OUTPATIENT
Start: 2018-10-03

## 2018-10-03 RX ORDER — RISPERIDONE 1 MG/1
1 TABLET, ORALLY DISINTEGRATING ORAL
Status: DISCONTINUED | OUTPATIENT
Start: 2018-10-03 | End: 2018-10-10 | Stop reason: HOSPADM

## 2018-10-03 RX ORDER — LORAZEPAM 2 MG/ML
2 INJECTION INTRAMUSCULAR EVERY 6 HOURS PRN
Status: DISCONTINUED | OUTPATIENT
Start: 2018-10-03 | End: 2018-10-10 | Stop reason: HOSPADM

## 2018-10-03 RX ORDER — HALOPERIDOL 5 MG/ML
5 INJECTION INTRAMUSCULAR EVERY 6 HOURS PRN
Status: CANCELLED | OUTPATIENT
Start: 2018-10-03

## 2018-10-03 RX ORDER — NICOTINE 21 MG/24HR
1 PATCH, TRANSDERMAL 24 HOURS TRANSDERMAL DAILY
Qty: 28 PATCH | Refills: 0 | Status: SHIPPED | OUTPATIENT
Start: 2018-10-04 | End: 2018-10-10 | Stop reason: HOSPADM

## 2018-10-03 RX ORDER — HYDROXYZINE HYDROCHLORIDE 25 MG/1
25 TABLET, FILM COATED ORAL EVERY 6 HOURS PRN
Status: DISCONTINUED | OUTPATIENT
Start: 2018-10-03 | End: 2018-10-10 | Stop reason: HOSPADM

## 2018-10-03 RX ORDER — HYDROXYZINE HYDROCHLORIDE 25 MG/1
25 TABLET, FILM COATED ORAL EVERY 6 HOURS PRN
Status: CANCELLED | OUTPATIENT
Start: 2018-10-03

## 2018-10-03 RX ORDER — BENZTROPINE MESYLATE 1 MG/1
1 TABLET ORAL EVERY 6 HOURS PRN
Status: DISCONTINUED | OUTPATIENT
Start: 2018-10-03 | End: 2018-10-10 | Stop reason: HOSPADM

## 2018-10-03 RX ORDER — LORAZEPAM 1 MG/1
1 TABLET ORAL EVERY 6 HOURS PRN
Status: DISCONTINUED | OUTPATIENT
Start: 2018-10-03 | End: 2018-10-10 | Stop reason: HOSPADM

## 2018-10-03 RX ORDER — BENZTROPINE MESYLATE 1 MG/1
1 TABLET ORAL EVERY 6 HOURS PRN
Status: CANCELLED | OUTPATIENT
Start: 2018-10-03

## 2018-10-03 RX ORDER — RISPERIDONE 1 MG/1
1 TABLET, ORALLY DISINTEGRATING ORAL
Status: CANCELLED | OUTPATIENT
Start: 2018-10-03

## 2018-10-03 RX ORDER — LORAZEPAM 2 MG/ML
2 INJECTION INTRAMUSCULAR EVERY 6 HOURS PRN
Status: CANCELLED | OUTPATIENT
Start: 2018-10-03

## 2018-10-03 RX ORDER — MAGNESIUM HYDROXIDE/ALUMINUM HYDROXICE/SIMETHICONE 120; 1200; 1200 MG/30ML; MG/30ML; MG/30ML
30 SUSPENSION ORAL EVERY 4 HOURS PRN
Status: CANCELLED | OUTPATIENT
Start: 2018-10-03

## 2018-10-03 RX ORDER — LORAZEPAM 1 MG/1
1 TABLET ORAL EVERY 6 HOURS PRN
Status: CANCELLED | OUTPATIENT
Start: 2018-10-03

## 2018-10-03 RX ORDER — OLANZAPINE 10 MG/1
10 INJECTION, POWDER, LYOPHILIZED, FOR SOLUTION INTRAMUSCULAR
Status: CANCELLED | OUTPATIENT
Start: 2018-10-03

## 2018-10-03 RX ORDER — BENZTROPINE MESYLATE 1 MG/ML
1 INJECTION INTRAMUSCULAR; INTRAVENOUS EVERY 6 HOURS PRN
Status: CANCELLED | OUTPATIENT
Start: 2018-10-03

## 2018-10-03 RX ORDER — TRAMADOL HYDROCHLORIDE 50 MG/1
50 TABLET ORAL EVERY 6 HOURS PRN
Status: CANCELLED | OUTPATIENT
Start: 2018-10-03

## 2018-10-03 RX ORDER — IBUPROFEN 400 MG/1
400 TABLET ORAL EVERY 8 HOURS PRN
Status: CANCELLED | OUTPATIENT
Start: 2018-10-03

## 2018-10-03 RX ORDER — OLANZAPINE 10 MG/1
10 INJECTION, POWDER, LYOPHILIZED, FOR SOLUTION INTRAMUSCULAR
Status: DISCONTINUED | OUTPATIENT
Start: 2018-10-03 | End: 2018-10-10 | Stop reason: HOSPADM

## 2018-10-03 RX ORDER — MAGNESIUM HYDROXIDE/ALUMINUM HYDROXICE/SIMETHICONE 120; 1200; 1200 MG/30ML; MG/30ML; MG/30ML
30 SUSPENSION ORAL EVERY 4 HOURS PRN
Status: DISCONTINUED | OUTPATIENT
Start: 2018-10-03 | End: 2018-10-10 | Stop reason: HOSPADM

## 2018-10-03 RX ORDER — IBUPROFEN 400 MG/1
400 TABLET ORAL EVERY 8 HOURS PRN
Status: DISCONTINUED | OUTPATIENT
Start: 2018-10-03 | End: 2018-10-10 | Stop reason: HOSPADM

## 2018-10-03 RX ORDER — HALOPERIDOL 5 MG/ML
5 INJECTION INTRAMUSCULAR EVERY 6 HOURS PRN
Status: DISCONTINUED | OUTPATIENT
Start: 2018-10-03 | End: 2018-10-10 | Stop reason: HOSPADM

## 2018-10-03 RX ADMIN — ENOXAPARIN SODIUM 40 MG: 40 INJECTION SUBCUTANEOUS at 10:44

## 2018-10-03 RX ADMIN — NICOTINE 1 PATCH: 21 PATCH TRANSDERMAL at 10:44

## 2018-10-03 NOTE — SOCIAL WORK
LOS: 1 Complex CM s/w [facility] who reported pt was accepted  Pt can be admitted after 1630  Nurse to call report to [phone]  WAYNE s/w [address] to request transport  CM awaiting cb

## 2018-10-03 NOTE — LETTER
October 8, 2018     Raymond Arvizu MD  [address] [place] [address], [id number]  [facility] [address]    Patient: Jeniffer Hughes   YOB: 1975   Date of Visit: 10/3/2018       Dear Dr Paresh Cannon Recipients: Thank you for referring Jeniffer Hughes to me for evaluation  Below are my notes for this consultation  If you have questions, please do not hesitate to call me  I look forward to following your patient along with you  Sincerely,        No name on file  CC: No Recipients  MONICA Aquino  10/7/2018  2:19 PM  Attested  Progress Note - Oralia [age] y o  female MRN: [id number]   Unit/Bed#: [facility] 254-02 Encounter: [id number]    Behavior over the last 24 hours:  Mary Martinez was seen for an inpatient follow-up psychiatric visit this date  She relates she is feeling better than yesterday and her depression is decreasing  She is tolerating the increase in Effexor without negative side effects except for slight lightheadedness  She denies any suicidal or homicidal ideation as well as any auditory or visual hallucinations  She is sleeping and eating appropriately and feels her medication is helping improve her mood  She does not want any medication changes at this time      ROS: no complaints    Mental Status Evaluation:    Appearance:  dressed appropriately   Behavior:  pleasant, cooperative   Speech:  normal rate and volume   Mood:  less anxious, less depressed   Affect:  mood-congruent   Thought Process:  organized, logical, coherent   Associations: intact associations   Thought Content:  normal   Perceptual Disturbances: no auditory hallucinations, no visual hallucinations   Risk Potential: Suicidal ideation - None  Homicidal ideation - None  Potential for aggression - No   Sensorium:  oriented to person, place, time/date and situation   Memory:  recent and remote memory grossly intact   Consciousness:  alert and awake   Attention: attention span and concentration are age appropriate Insight:  fair   Judgment: fair   Gait/Station: normal gait/station and normal balance   Motor Activity: no abnormal movements     Vital signs in last 24 hours:    Temp:  [98 °F (36 7 °C)-98 6 °F (37 °C)] 98 6 °F (37 °C)  HR:  [72-93] 93  Resp:  [16-18] 18  BP: (119-124)/(56-70) 119/56    Laboratory results:  I have personally reviewed all pertinent laboratory/tests results  Progress Toward Goals: progressing    Assessment/Plan   Principal Problem:    Major depressive disorder, recurrent severe without psychotic features ([facility] [address] )  Active Problems:    Generalized anxiety disorder    PROVISIONAL PTSD (post-traumatic stress disorder)    Recommended Treatment:   1  Will continue current psychiatric medications as prescribed  2    Will continue to monitor patient and adjust medications as needed  3    Discharge disposition and planning are ongoing  All current active medications have been reviewed    Encourage group therapy, milieu therapy and occupational therapy  [address] checks every 5 minutes      Current Facility-Administered Medications:  aluminum-magnesium hydroxide-simethicone 30 mL Oral Q4H PRN Naomy N Lodics, CRNP   benztropine 1 mg Intramuscular Q6H PRN Naomy N Lodics, CRNP   benztropine 1 mg Oral Q6H PRN Naomy N Lodics, CRNP   cloNIDine 0 1 mg Oral HS Adria Cover III, DO   haloperidol 5 mg Oral Q6H PRN Naomy N Lodics, CRNP   haloperidol lactate 5 mg Intramuscular Q6H PRN Naomy N Lodics, CRNP   hydrOXYzine HCL 25 mg Oral Q6H PRN Naomy N Lodics, CRNP   ibuprofen 400 mg Oral Q8H PRN Naomy N Lodics, CRNP   ibuprofen 600 mg Oral Q8H PRN Naomy N Lodics, CRNP   LORazepam 2 mg Intramuscular Q6H PRN Naomy N Lodics, CRNP   LORazepam 1 mg Oral Q6H PRN Naomy N Lodics, CRNP   magnesium hydroxide 30 mL Oral Daily PRN Naomy N Lodics, CRNP   nicotine 1 patch Transdermal Daily Naomy N Lodics, CRNP   OLANZapine 10 mg Intramuscular Q3H PRN Peg Grime, CRNP risperiDONE 1 mg Oral Q3H PRN MONICA Trivedi   traMADol 50 mg Oral Q6H PRN MONICA Trivedi   venlafaxine 75 mg Oral Daily MONICA Trivedi       Risks / Benefits of Treatment:    Risks, benefits, and possible side effects of medications explained to patient and patient verbalizes understanding and agreement for treatment  Counseling / Coordination of Care:      Patient's progress reviewed with nursing staff  Medications, treatment progress and treatment plan reviewed with patient  Attestation signed by Jossue Terrazas MD at 10/7/2018  2:44 PM:  This patient seen chart reviewed I concurred with the above note finding as well as assessment and the treatment plan outlined in the above note    Ngozi Moreland, [address]  10/6/2018 12:15 PM  Attested  Progress Note - Oralia [age] y o  female MRN: [id number]   Unit/Bed#: [facility] 254-02 Encounter: [id number]    Behavior over the last 24 hours:   Dave Mcnulty was seen for an inpatient follow-up psychiatric visit this date  She relates her mood is better than before although she remained somewhat depressed  Affect is tearful  She requests her Effexor be increased  She denies any current suicidal or homicidal ideation as well as any symptoms of psychosis  Per nursing report, she is compliant with medications and meals  She denies any side effects  She was able to sleep through the night without difficulty      ROS: no complaints    Mental Status Evaluation:    Appearance:  casually dressed, dressed appropriately   Behavior:  pleasant, cooperative   Speech:  normal rate and volume   Mood:  depressed   Affect:  tearful, flat   Thought Process:  organized, logical, coherent   Associations: intact associations   Thought Content:  no overt delusions   Perceptual Disturbances: no auditory hallucinations, no visual hallucinations   Risk Potential: Suicidal ideation - None  Homicidal ideation - None  Potential for aggression - No Sensorium:  oriented to person, place, time/date and situation   Memory:  recent and remote memory grossly intact   Consciousness:  alert and awake   Attention: decreased concentration and decreased attention span   Insight:  fair   Judgment: fair   Gait/Station: normal gait/station and normal balance   Motor Activity: no abnormal movements     Vital signs in last 24 hours:    Temp:  [98 3 °F (36 8 °C)-98 8 °F (37 1 °C)] 98 3 °F (36 8 °C)  HR:  [77-83] 77  Resp:  [18] 18  BP: (118-123)/(67) 118/67    Laboratory results:  I have personally reviewed all pertinent laboratory/tests results  Progress Toward Goals: progressing    Assessment/Plan   Principal Problem:    Major depressive disorder, recurrent severe without psychotic features ([facility] [address] )  Active Problems:    Generalized anxiety disorder    PROVISIONAL PTSD (post-traumatic stress disorder)    Recommended Treatment:   1  Will increase Effexor to 75 mg daily  2    Will continue to monitor patient and adjust medications as needed  3    Will continue to encourage patient to participate in group and occupational therapy  4    Discharge disposition planning are ongoing  All current active medications have been reviewed    Encourage group therapy, milieu therapy and occupational therapy  Behavioral Health checks every 5 minutes      Current Facility-Administered Medications:  aluminum-magnesium hydroxide-simethicone 30 mL Oral Q4H PRN Naomy N Lodics, CRNP   benztropine 1 mg Intramuscular Q6H PRN Naomy N Lodics, CRNP   benztropine 1 mg Oral Q6H PRN Naomy N Lodics, CRNP   cloNIDine 0 1 mg Oral HS Raffy Copier III, DO   haloperidol 5 mg Oral Q6H PRN Naomy N Lodics, CRNP   haloperidol lactate 5 mg Intramuscular Q6H PRN Naomy N Lodics, CRNP   hydrOXYzine HCL 25 mg Oral Q6H PRN Naomy N Lodics, CRNP   ibuprofen 400 mg Oral Q8H PRN Naomy N Lodics, CRNP   ibuprofen 600 mg Oral Q8H PRN Naomy N Lodics, CRNP   LORazepam 2 mg Intramuscular Q6H PRN Naomy Pascal, CRNP   LORazepam 1 mg Oral Q6H PRN Naomy Pascal, CRNP   magnesium hydroxide 30 mL Oral Daily PRN Naomy Pascal, CRNP   nicotine 1 patch Transdermal Daily Naomy Pascal, CRNP   OLANZapine 10 mg Intramuscular Q3H PRN Naomy Pascal, CRNP   risperiDONE 1 mg Oral Q3H PRN Naomy Pascal, CRNP   traMADol 50 mg Oral Q6H PRN Naomy Pascal, CRNP   venlafaxine 37 5 mg Oral Daily Cleophus Alamin III, DO       Risks / Benefits of Treatment:    Risks, benefits, and possible side effects of medications explained to patient and patient verbalizes understanding and agreement for treatment  Counseling / Coordination of Care:      Patient's progress reviewed with nursing staff  Medications, treatment progress and treatment plan reviewed with patient  Attestation signed by Jen Gonzalez MD at 10/6/2018  1:30 PM:  This patient seen chart reviewed I also reviewed the above note and I concurred with the finding of id the patient continued to have some depression but she is organized in her thought processes and pleasant and cooperative    MONICA Moore  10/5/2018 11:06 AM  Attested  Progress Note - Oralia [age] y o  female MRN: [id number]   Unit/Bed#: [facility] 254-02 Encounter: [id number]    Behavior over the last 24 hours:  Having was seen for an inpatient follow-up psychiatric visit this date  She remains tearful and depressed but denies any suicidal or homicidal ideation at this time  She reports that her appetite is good but she did have difficulty sleeping last night  She declined her clonidine but after education agreed to try it tonight      ROS: no complaints    Mental Status Evaluation:    Appearance:  casually dressed, dressed appropriately   Behavior:  Slightly guarded but cooperative   Speech:  slow, soft   Mood:  depressed   Affect:  tearful, flat   Thought Process:  logical, coherent   Associations: intact associations   Thought Content: no overt delusions   Perceptual Disturbances: no auditory hallucinations, no visual hallucinations   Risk Potential: Suicidal ideation - None  Homicidal ideation - None  Potential for aggression - No   Sensorium:  oriented to person, place, time/date and situation   Memory:  recent and remote memory grossly intact   Consciousness:  alert and awake   Attention: decreased concentration and decreased attention span   Insight:  fair   Judgment: fair   Gait/Station: normal gait/station and normal balance   Motor Activity: no abnormal movements     Vital signs in last 24 hours:    Temp:  [97 8 °F (36 6 °C)-99 1 °F (37 3 °C)] 99 1 °F (37 3 °C)  HR:  [72-79] 72  Resp:  [16-18] 18  BP: (127-131)/(68-76) 131/68    Laboratory results:  I have personally reviewed all pertinent laboratory/tests results  Progress Toward Goals: progressing    Assessment/Plan   Principal Problem:    Major depressive disorder, recurrent severe without psychotic features ([facility] [address] )  Active Problems:    Generalized anxiety disorder    PROVISIONAL PTSD (post-traumatic stress disorder)    Recommended Treatment:   1  Will continue current psychiatric medications and monitor patient's response  2    Will continue to encourage patient to participate in group and occupational therapy  3   Will adjust medications as needed  4    Discharge disposition and planning are ongoing  All current active medications have been reviewed    Encourage group therapy, milieu therapy and occupational therapy  Behavioral Health checks every 5 minutes      Current Facility-Administered Medications:  aluminum-magnesium hydroxide-simethicone 30 mL Oral Q4H PRN Naomy Pascal, CRNP   benztropine 1 mg Intramuscular Q6H PRN Naomy Pascal, CRNP   benztropine 1 mg Oral Q6H PRN Naomy Pascal, CRNP   cloNIDine 0 1 mg Oral HS Brit Hunter III, DO   haloperidol 5 mg Oral Q6H PRN Naomy Pascal, CRNP   haloperidol lactate 5 mg Intramuscular Q6H PRN Alea Pascal, CRNP   hydrOXYzine HCL 25 mg Oral Q6H PRN Naomy SPENCE Lodics, CRNP   ibuprofen 400 mg Oral Q8H PRN Naomy Prettyics, CRNP   ibuprofen 600 mg Oral Q8H PRN Naomy Prettyics, CRNP   LORazepam 2 mg Intramuscular Q6H PRN Naomy SPENCE Lodics, CRNP   LORazepam 1 mg Oral Q6H PRN Naomy SPENCE Lodics, CRNP   magnesium hydroxide 30 mL Oral Daily PRN Naomy SPENCE Lodics, CRNP   nicotine 1 patch Transdermal Daily Naomy Pascal, CRNP   OLANZapine 10 mg Intramuscular Q3H PRN Naomy Prettyics, CRNP   risperiDONE 1 mg Oral Q3H PRN Naomy Prettyics, CRNP   traMADol 50 mg Oral Q6H PRN Naomy Pascal, CRNP   venlafaxine 37 5 mg Oral Daily Thoa Kuo III, DO       Risks / Benefits of Treatment:    Risks, benefits, and possible side effects of medications explained to patient and patient verbalizes understanding and agreement for treatment  Counseling / Coordination of Care:      Patient's progress discussed with staff in treatment team meeting  Medications, treatment progress and treatment plan reviewed with patient  Attestation signed by Thao Kuo III, DO at 10/5/2018  3:35 PM:  I have reviewed the Advanced Practitioner's note  Discussed with team and with Advanced Practitioner  Agree with assessment and plan      Thao Kuo III, DO  10/5/2018

## 2018-10-03 NOTE — SOCIAL WORK
CM s/w pt's  to discuss dcp  Pt's  in agreement and requested update when pt is accepted and transport is secure  CM to cb with further update

## 2018-10-03 NOTE — DISCHARGE SUMMARY
Discharge Summary - Yaz Cerda 38 y o  female MRN: [id number]    Unit/Bed#:  Encounter: [id number]    Admission Date:   Admission Orders     Ordered        10/02/18 0941  Inpatient Admission  Once         10/01/18 1744  Place in Observation (expected length of stay for this patient is less than two midnights)  Once               Admitting Diagnosis: Overdose [T50 901A]  Suicidal behavior with attempted self-injury ([address] ) Mackenzie Garcia    HPI:   Yaz Cerda is a 38year old female that has a history of major depressive disorder and generalized anxiety disorder that presented to [address] [place] [address] after intentionally ingesting one hundred, 50 mg milligram tablets  She denied any other ingestions at the time  She was tachycardic and hypotensive on arrival to the emergency room  She was given IVF and her blood pressure improved to normal limits  She was admitted to the internal medicine service  Procedures Performed:   Orders Placed This Encounter   Procedures    ED ECG Documentation Only       Summary of Hospital Course:   Over the course of her first 24 hours she became hypotension again requiring 5 L of crystalloid and albumin  She required low levels of O2 during the initial 24 hours  Her QTc remained normal   Her mental status significantly improved  She was seen by psychiatry and signed a 201  It was recommended that a 302 be initiated if she decided against the 201  Her mental status is back to baseline today  She is hemodynamically stable and off O2  She is medically cleared for inpatient psychiatric admission      Significant Findings, Care, Treatment and Services Provided:   No orders to display       Complications: none    Discharge Diagnosis: intentional overdose with trazadone     Resolved Problems  Date Reviewed: 10/3/2018          Resolved    Hypokalemia 10/3/2018     Resolved by  Abelardo Long PA-C    Hypotension 10/3/2018     Resolved by  Abelardo Long PA-C Condition at Discharge: good         Discharge instructions/Information to patient and family:   See after visit summary for information provided to patient and family  Provisions for Follow-Up Care:  See after visit summary for information related to follow-up care and any pertinent home health orders  PCP: Armin Vigil MD    Disposition: inpatient psych    Planned Readmission: No    Discharge Statement   I spent 25 minutes discharging the patient  This time was spent on the day of discharge  I had direct contact with the patient on the day of discharge  Additional documentation is required if more than 30 minutes were spent on discharge  Discharge Medications:  See after visit summary for reconciled discharge medications provided to patient and family

## 2018-10-03 NOTE — PLAN OF CARE
COPING     Pt/Family able to verbalize concerns and demonstrate effective coping strategies Progressing     Will report anxiety at manageable levels Progressing        Depression - IP adult     Effects of depression will be minimized Progressing        DISCHARGE PLANNING     Discharge to home or other facility with appropriate resources Progressing        INFECTION - ADULT     Absence or prevention of progression during hospitalization Progressing     Absence of fever/infection during neutropenic period Progressing        Knowledge Deficit     Patient/family/caregiver demonstrates understanding of disease process, treatment plan, medications, and discharge instructions Progressing        METABOLIC, FLUID AND ELECTROLYTES - ADULT     Electrolytes maintained within normal limits Progressing     Fluid balance maintained Progressing        PAIN - ADULT     Verbalizes/displays adequate comfort level or baseline comfort level Progressing        Potential for Falls     Patient will remain free of falls Progressing        Prexisting or High Potential for Compromised Skin Integrity     Skin integrity is maintained or improved Progressing        SAFETY ADULT     Patient will remain free of falls Progressing     Maintain or return to baseline ADL function Progressing     Maintain or return mobility status to optimal level Progressing        SELF HARM     Effect of psychiatric condition will be minimized and patient will be protected from self harm Progressing

## 2018-10-03 NOTE — PROGRESS NOTES
Daily Progress Note - Critical Care/ Stepdown   Rickey Chambers 38 y o  female MRN: [id number]  Unit/Bed#:  Encounter: [id number]    Assessment:   Principal Problem:    Intentional drug overdose ([facility] )  Active Problems:    Major depressive disorder, recurrent severe without psychotic features ([facility] )    Generalized anxiety disorder    Suicidal behavior with attempted self-injury ([facility] )  Resolved Problems:    Hypokalemia    Hypotension      Plan:    Neuro:   Intentional drug overdose  - reported taking at least 50 mg of 150 trazodone tablets  - initially was admitted to the floor, however patient became transiently responsive to fluids, so was transferred to step-down unit  - aggressively fluid resuscitated, now blood pressure within normal limits  - much more awake today  - psych evaluated patient, patient signed 12  Is a candidate for 302 of patient decides to attempt to leave  - continue one-to-one, finger foods    History of generalized anxiety disorder, major depressive disorder  - see above  - inpatient psych discharge    Regulate sleep/wake cycle    CV:   Tachycardia  - resolved    Hypotension  - resolved  - IV fluids stopped    Cardiac infusions: No issues  Rhythm: NSR  Follow rhythm on telemetry    Pulm:   No issues  Encourage deep breathing/coughing/IS/PulmToileting       GI:   No issues  Past bedside swallow evaluation  Nutrition/diet plan:  Regular diet finger foods  Stress ulcer prophylaxis: No prophylaxis needed    :   No issues  Voiding spontaneously      FEN:   No issues  Fluid/Diuretic plan: No intervention  Goal 24 hour fluid balance:  Even  Electrolytes repleted:  Pending  Goal: K >4 0, Mag >2 0, and Phos >3 0    ID:   No signs or symptoms of infection    Trend temps and WBC count    Heme:   No issues  CBC pending at time of this note   Trend hgb and plts  Transfuse as needed for goal hgb >7    Endo:   No history of diabetes    TSH within normal limits    Msk/Skin:  Mobility goal: Out of bed  PT consult: not applicable  OT consult: not applicable  Frequent turning and off-loading    Family:  Will update family with plan of care today  Lines:  Peripheral IVs    VTE Prophylaxis:  Pharmacologic Prophylaxis: Enoxaparin (Lovenox)  Mechanical Prophylaxis: sequential compression device    Disposition: Stable for discharge to psych facility once psych facility found    Code Status: Level 1 - Full Code    Counseling / Coordination of Care  Total time spent today 32 minutes  ______________________________________________________________________    CC: "I feel fine"    HPI/24hr events:   Patient underwent psych evaluation yesterday  Sign 201 does meet criteria for 302  Patient had ring removed with help from ER staff  Past bedside swallow evaluation and is much more awake      ______________________________________________________________________    Review of Systems   Constitutional: Positive for fatigue        ______________________________________________________________________    VITALS:    Vitals:    10/02/18 1600 10/02/18 1934 10/02/18 2000 10/03/18 0300   BP: 110/66 115/70 121/77 111/64   BP Location:  Left arm     Pulse: 58 60 59 70   Resp:  18 19   Temp:       TempSrc:       SpO2: 98% 97% 98% 91%   Weight:       Height:         Temp  Min: 97 5 °F (36 4 °C)  Max: 98 6 °F (37 °C)  IBW: 57 kg       Temp (24hrs), Av 5 °F (36 9 °C), Min:98 4 °F (36 9 °C), Max:98 5 °F (36 9 °C)    HR:  [58-73] 70  Resp:  [10-22] 19  BP: ()/(56-77) 111/64  SpO2:  [91 %-98 %] 91 %    Weights:   IBW: 57 kg    Body mass index is 30 08 kg/m²    Weight (last 2 days)     Date/Time   Weight    10/01/18 1949  82 (180 78)    10/01/18 1422  81 9 (180 56)              Hemodynamic Monitoring:  N/A       Non-Invasive/Invasive Ventilation Settings:  Respiratory    Lab Data (Last 4 hours)    None         O2/Vent Data (Last 4 hours)    None              No results found for: PHART, PGR7XGA, PO2ART, GNW6PCU, D4NNPSKM, BEART, SOURCE  SpO2: SpO2: 91 %, SpO2 Activity: SpO2 Activity: At Rest, SpO2 Device: O2 Device: Nasal cannula    ______________________________________________________________________    Physical Exam:   Physical Exam   Constitutional: She is oriented to person, place, and time  She appears well-developed and well-nourished  HENT:   Head: Normocephalic and atraumatic  Eyes: Pupils are equal, round, and reactive to light  EOM are normal    Neck: Normal range of motion  Neck supple  No JVD present  Cardiovascular: Normal rate and regular rhythm  No murmur heard  Pulmonary/Chest: Effort normal and breath sounds normal    Abdominal: Soft  Bowel sounds are normal  She exhibits no distension  Musculoskeletal: Normal range of motion  She exhibits no edema  Neurological: She is alert and oriented to person, place, and time  No cranial nerve deficit  GCS eye subscore is 4  GCS verbal subscore is 5  GCS motor subscore is 6  Skin: Skin is warm and dry  Capillary refill takes less than 2 seconds  Nursing note and vitals reviewed  ______________________________________________________________________    Intake and Outputs:  I/O       10/01 0701 - 10/02 0700 10/02 0701 - 10/03 0700    I V  (mL/kg)  3441 7 (42)    IV Piggyback 6100     Total Intake(mL/kg) 6100 (74 4) 3441 7 (42)    Urine (mL/kg/hr) 250 900 (0 5)    Total Output 250 900    Net +5850 +2541 7          Unmeasured Urine Occurrence 2 x 2 x            Intake/Output Summary (Last 24 hours) at 10/03/18 0459  Last data filed at 10/03/18 0131   Gross per 24 hour   Intake          3441 67 ml   Output             1400 ml   Net          2041 67 ml         Nutrition:        Diet Orders            Start     Ordered    10/02/18 2140  Diet Regular; Finger Foods  Diet effective now     Question Answer Comment   Diet Type Regular    Regular Finger Foods    RD to adjust diet per protocol?  Yes        10/02/18 2140 ______________________________________________________________________    Labs:     Results from last 7 days  Lab Units 10/02/18  0542 10/01/18  1448   WBC Thousand/uL 9 42 8 82   HEMOGLOBIN g/dL 11 6 15 1   HEMATOCRIT % 34 7* 45 0   PLATELETS Thousands/uL 153 208   NEUTROS PCT %  --  64   MONOS PCT %  --  7       Results from last 7 days  Lab Units 10/02/18  0543 10/01/18  1448   SODIUM mmol/L 143 140   POTASSIUM mmol/L 4 2 3 2*   CHLORIDE mmol/L 112* 104   CO2 mmol/L 23 23   BUN mg/dL 2* 8   CREATININE mg/dL 0 80 0 96   CALCIUM mg/dL 7 2* 8 3   ALK PHOS U/L 40* 65   ALT U/L 14 17   AST U/L 7 13       Results from last 7 days  Lab Units 10/01/18  1522   MAGNESIUM mg/dL 1 6     No results found for: PHOS     Results from last 7 days  Lab Units 10/01/18  1448   INR  1 07   PTT seconds 23*       0  Lab Value Date/Time   TROPONINI <0 02 10/01/2018 1448         ABG:No results found for: PHART, TMR1JIC, PO2ART, VEI8ZZT, I3IQHYJM, BEART, SOURCE    Micro:  No results found for: Dariana Torres SPUTUMCULTUR    Imaging:   I have personally reviewed pertinent films in PACS     EKG: Pending  ______________________________________________________________________    Allergies:    Allergies   Allergen Reactions    Oxycodone-Acetaminophen Hives and Itching     vomiting and dizziness    Pentosan Polysulfate Hives and Itching    Pollen Extract     Sulfa Antibiotics Hives, Vomiting, Itching and Rash       Medications:   Scheduled Meds:    Current Facility-Administered Medications:  enoxaparin 40 mg Subcutaneous Q24H Emmie Henry MD   nicotine 1 patch Transdermal Daily Mariela Hubbard MD   ondansetron 4 mg Intravenous Q6H PRN Mariela Hubbard MD   sodium chloride 1,000 mL Intravenous Q4H PRN Mariela Hubbard MD     Continuous Infusions:   PRN Meds:    ondansetron 4 mg Q6H PRN   sodium chloride 1,000 mL Q4H PRN      ______________________________________________________________________    Invasive lines and devices: Invasive Devices     Peripheral Intravenous Line            Peripheral IV 10/01/18 Left Antecubital 2 days    Peripheral IV 10/01/18 Right Antecubital 1 day                   SIGNATURE: Angel Upton PA-C  DATE: October 3, 2018    Portions of the record may have been created with voice recognition software  Occasional wrong word or "sound a like" substitutions may have occurred due to the inherent limitations of voice recognition software  Read the chart carefully and recognize, using context, where substitutions have occurred

## 2018-10-03 NOTE — SOCIAL WORK
CM s/w WISAM Alba who reported transport will be 1930  CM s/w [facility] to make her aware  CM s/w pt's  to make him aware of dcp and he is in agreement  CM notified nurse and he will make pt aware  Nurse to call report to [phone]

## 2018-10-03 NOTE — DISCHARGE INSTRUCTIONS
Anxiety   WHAT YOU SHOULD KNOW:   Anxiety is a condition that causes you to feel excessive worry, uneasiness, or fear  Family or work stress, smoking, caffeine, and alcohol can increase your risk for anxiety  Certain medicines or health conditions can also increase your risk  Anxiety may begin gradually, and can become a long-term condition if it is not managed or treated  AFTER YOU LEAVE:   Medicines:   · Medicines  can help you feel more calm and relaxed, and decrease your symptoms  · Take your medicine as directed  Contact your healthcare provider if you think your medicine is not helping or if you have side effects  Tell him if you are allergic to any medicine  Keep a list of the medicines, vitamins, and herbs you take  Include the amounts, and when and why you take them  Bring the list or the pill bottles to follow-up visits  Carry your medicine list with you in case of an emergency  Follow up with your healthcare provider within 2 weeks or as directed:  Write down your questions so you remember to ask them during your visits  Manage anxiety:   · Go to counseling as directed  Cognitive behavioral therapy can help you understand and change how you react to events that trigger your symptoms  · Find ways to manage your symptoms  Activities such as exercise, meditation, or listening to music can help you relax  · Practice deep breathing  Breathing can change how your body reacts to stress  Focus on taking slow, deep breaths several times a day, or during an anxiety attack  Breathe in through your nose, and out through your mouth  · Avoid caffeine  Caffeine can make your symptoms worse  Avoid foods or drinks that are meant to increase your energy level  · Limit or avoid alcohol  Ask your healthcare provider if alcohol is safe for you  You may not be able to drink alcohol if you take certain anxiety or depression medicines  Limit alcohol to 1 drink per day if you are a woman   Limit alcohol to 2 drinks per day if you are a man  A drink of alcohol is 12 ounces of beer, 5 ounces of wine, or 1½ ounces of liquor  Contact your healthcare provider if:   · Your symptoms get worse or do not get better with treatment  · You think your medicine may be causing side effects  · Your anxiety keeps you from doing your regular daily activities  · You have new symptoms since your last visit  · You have questions or concerns about your condition or care  Seek care immediately or call 911 if:   · You have chest pain, tightness, or heaviness that may spread to your shoulders, arms, jaw, neck, or back  · You feel like hurting yourself or someone else  · You feel dizzy, lightheaded, or faint  © 2014 [address] [employer] is for End User's use only and may not be sold, redistributed or otherwise used for commercial purposes  All illustrations and images included in CareNotes® are the copyrighted property of A D A M , Inc  or [employer]  The above information is an  only  It is not intended as medical advice for individual conditions or treatments  Talk to your doctor, nurse or pharmacist before following any medical regimen to see if it is safe and effective for you  Adult Overdose   WHAT YOU NEED TO KNOW:   An overdose occurs when you take more medicine than is safe to take  An overdose may be mild, or it may be a life-threatening emergency  You may feel drowsy, dizzy, or nauseated, depending on what medicine you took  No specific harm was found to your body as a result of your overdose  Your symptoms have decreased over the last 6 to 12 hours  DISCHARGE INSTRUCTIONS:   Call 911 if you or someone close to you has any of the following symptoms:   · Your face is very pale and clammy to the touch  · Your body is limp or you are unable to speak  · You cannot be awakened       · Your breathing is slower or faster than usual      · Your heart is beating slower than usual     · You feel confused or more tired than usual, or you are sweating more than normal     · Your speech is slurred  · Your fingernails or lips are blue or purple  Return to the emergency department if:   · You have severe nausea and vomiting  · You cannot have a bowel movement or urinate  · Your skin and the whites of your eyes turn yellow  Contact your healthcare provider if:   · You think your medicine is not working  · You have nausea, vomiting, diarrhea, or abdominal cramps  · You have questions or concerns about your medicine  Take your medicine as directed:  Contact your healthcare provider if you think your medicine is not helping or if you have side effects  Do not take more medicine that is prescribed  Keep your medicines in the original containers  Keep a list of the medicines, vitamins, and herbs you take  Include the amounts, and when and why you take them  Do not share your medicine with others  Prevent another overdose:   · Read labels carefully  Read the labels of all the medicines that you take  Never take more than the label says to take  If you have questions, ask your pharmacist or healthcare provider  · Do not drink alcohol  Alcohol increases your risk for another overdose  Alcohol can also hide important symptoms that you need to call your healthcare provider for  · Do not drive or operate machinery  until your healthcare provider says it is okay  These activities may be dangerous after an overdose  · Use caution if you take more than one medicine at a time  Mixing medicines or taking more than one medicine at a time can be dangerous  · Tell your family or friends what medicines you are taking  Talk with them about what to do if you have an overdose  Follow up with your healthcare provider as directed: You may need to see a counselor or psychiatrist  Write down your questions so you remember to ask them during your visits     © 2017 [address] [facility] Information is for End User's use only and may not be sold, redistributed or otherwise used for commercial purposes  All illustrations and images included in CareNotes® are the copyrighted property of A D A M , Inc  or Ab Turner  The above information is an  only  It is not intended as medical advice for individual conditions or treatments  Talk to your doctor, nurse or pharmacist before following any medical regimen to see if it is safe and effective for you  Adult Overdose   [facility]): [facility] Opioid Overdose Prevention Toolkit  [facility])  Mono Mccann MD  2014  Available from URL: http://store  [facility] gov/shin/content//CCJ22-2130/Overdose_Toolkit pdf  As accessed 2015-03-10  Praneeth CH : Opioids  In: apta.me [facility], [place] , [facility] Emergency Medicine Concepts and Clinical Practice, 8th ed  [address] , [place], [place], 2014  Chace Wells EW: Management of opioid analgesic overdose  N Engl J Med 2012; 367(2):146-155  Matty Encinas G : Antidepressant Poisoning  In: Indira Hernandez, Carter RM, [facility], et al, eds  [address] Emergency Medicine Consult, 4th ed  [address] [facility] [place], [place], 2011  Elisabet MB & Amaya AV : Opiate Poisoning  In: Ziggy RAMSAY, Sebastien SR, et al, eds  [address] Emergency Medicine Consult, 4th ed  [address] [facility] [place], [place], 2011  [facility] Against [facility]cription Drug Abuse: Drug Overdose  [facility] Abuse  [place], [place]  2014  Available from URL: http://ncapda org/index  php?option=com_content&view=article&id=79:drug-overdose&catid=33:students&Itemid=7  As accessed 2014-10-15  National Safety [place]: Unintentional Overdoses  [address]  [address]   Available from URL: AlexvedElida is  aspx  As accessed 2014-10-15  Jason Krueger Howland MA , et al: Principles of Managing the Acutely Poisoned or Overdosed Patient  In: Jason Krueger Howland MA, et al, eds  [facility]'s Toxicological Emergencies, 9th ed  [place], [place], 2011 © 2017 [address] [facility] Information is for End User's use only and may not be sold, redistributed or otherwise used for commercial purposes  All illustrations and images included in CareNotes® are the copyrighted property of A D A M , Inc  or Ab Turner  The above information is an  only  It is not intended as medical advice for individual conditions or treatments  Talk to your doctor, nurse or pharmacist before following any medical regimen to see if it is safe and effective for you

## 2018-10-03 NOTE — SOCIAL WORK
Per ICU AP pt is clear to dc for IP MH  Complex CM met with pt at bedside to discuss dcp and IP MH  CM confirmed pt does not have insurance and is MA Pending  Pt is agreeable to IP MH and signed 201  Pt requested CM update  prior to dc  Complex CM s/w [facility], crisis who reported no beds available at this time  CM s/w [facility] who reported bed is available and CM could fax for review  CM faxed and is awaiting determination  Pt aware she will have to stay in network as she has no insurance and is MA Pending

## 2018-10-04 PROBLEM — F43.10 PTSD (POST-TRAUMATIC STRESS DISORDER): Status: ACTIVE | Noted: 2018-10-04

## 2018-10-04 LAB
CHOLEST SERPL-MCNC: 181 MG/DL (ref 0–200)
HDLC SERPL-MCNC: 28 MG/DL (ref 40–60)
LDLC SERPL CALC-MCNC: 102 MG/DL (ref 75–193)
NONHDLC SERPL-MCNC: 153 MG/DL
RPR SER QL: NORMAL
TRIGL SERPL-MCNC: 256 MG/DL (ref 44–166)

## 2018-10-04 PROCEDURE — 99222 1ST HOSP IP/OBS MODERATE 55: CPT | Performed by: PSYCHIATRY & NEUROLOGY

## 2018-10-04 PROCEDURE — 86592 SYPHILIS TEST NON-TREP QUAL: CPT | Performed by: NURSE PRACTITIONER

## 2018-10-04 PROCEDURE — 80061 LIPID PANEL: CPT | Performed by: NURSE PRACTITIONER

## 2018-10-04 RX ORDER — NICOTINE 21 MG/24HR
1 PATCH, TRANSDERMAL 24 HOURS TRANSDERMAL DAILY
Status: DISCONTINUED | OUTPATIENT
Start: 2018-10-04 | End: 2018-10-10 | Stop reason: HOSPADM

## 2018-10-04 RX ORDER — CLONIDINE HYDROCHLORIDE 0.1 MG/1
0.1 TABLET ORAL
Status: DISCONTINUED | OUTPATIENT
Start: 2018-10-04 | End: 2018-10-08

## 2018-10-04 RX ORDER — VENLAFAXINE HYDROCHLORIDE 37.5 MG/1
37.5 CAPSULE, EXTENDED RELEASE ORAL DAILY
Status: DISCONTINUED | OUTPATIENT
Start: 2018-10-04 | End: 2018-10-06

## 2018-10-04 RX ADMIN — VENLAFAXINE HYDROCHLORIDE 37.5 MG: 37.5 CAPSULE, EXTENDED RELEASE ORAL at 10:08

## 2018-10-04 RX ADMIN — NICOTINE 1 PATCH: 21 PATCH, EXTENDED RELEASE TRANSDERMAL at 10:07

## 2018-10-04 NOTE — SOCIAL WORK
MANE contacts Dr Myesha Peterson office phone# [phone]; pt has scheduled appt on 11/15/18; office requests hospital to fax H &P and discharge summary to their office fax#[phone]; notes to be faxed  Office to call and provide new appt for patient

## 2018-10-04 NOTE — PLAN OF CARE
Dr. Hussein updated regarding patient's bradycardia, high 30s-low 40s. Labetalol decreased to 100 mg BID. Hold if HR < 50. Will continue to monitor.    Patient signed self in for inpt treatment

## 2018-10-04 NOTE — PROGRESS NOTES
Pt visible on unit  Quiet  Tearful during initial assessment  Pt remorseful about attempted overdose prior to admission  Pt states, "I am happy to be here" Good insight  Positive outlook on treatment and willing to actively participate plan of care  Pt reports 8/10 depression and 8/10 anxiety  Denies any thoughts of self harm and denies SI  Reviewed new prescribed medications and provided education on Effexor and clonidine  Pt verbalized understanding  Administered first dose of Effexor 37 5mg PO in AM  Pt reported difficulty sleeping last night but expressed that it was because it was a new environment  Pt aware that PRN medications are available if needed at HS  Pt states, "I am trying to stay away from any unnecessary medications" Hygiene is good  Compliant with meals  Safety precautions maintained Will continue to monitor and assess

## 2018-10-04 NOTE — PROGRESS NOTES
Patient stated her night was restless  Labs were drawn this AM  Will continue to monitor safety and behaviors every 7 minutes

## 2018-10-04 NOTE — CONSULTS
awake alert oriented cooperative  Skin warm dry intact   neck supple FROM  Breath sounds equal clear no adventitious sounds  Heart tones wnl no gallops rubs murmurs  Abdomen soft non tender bowel sounds present all quadrants  No c/o offered  Labs reviewed   Plan as per [facility] team

## 2018-10-04 NOTE — TREATMENT PLAN
TREATMENT PLAN REVIEW - [address] 38 y o  1975 female MRN: [id number]    [address] Room / Bed: Romi Lacy/[facility] [id number] Encounter: [id number]          Admit Date/Time:  10/3/2018  8:39 PM    Treatment Team: Attending Provider: Laura Robles DO; Patient Care Assistant: Yahir Cho; : Trenton Barber RN; Recreational Therapist: Eleanor Liao;  Registered Nurse: Dash Orellana RN    Diagnosis: Principal Problem:    Major depressive disorder, recurrent severe without psychotic features ([facility] )  Active Problems:    Generalized anxiety disorder    PROVISIONAL PTSD (post-traumatic stress disorder)    Patient Strengths: ability for insight, cooperative, family ties, patient is on a voluntary commitment     Patient Barriers: difficulty adapting    Short Term Goals: decrease in depressive symptoms, decrease in anxiety symptoms, improvement in insight, improvement in reasoning ability    Long Term Goals: improvement in depression, improvement in anxiety, free of suicidal thoughts, improvement in reasoning ability, improved insight, acceptance of need for psychiatric medications, acceptance of need for psychiatric treatment, acceptance of need for psychiatric follow up after discharge    Progress Towards Goals: starting psychiatric medications as prescribed    Recommended Treatment: medication management, patient medication education, group therapy, milieu therapy, continued Behavioral Health psychiatric evaluation/assessment process    Treatment Frequency: daily medication monitoring, group and milieu therapy daily, monitoring through interdisciplinary rounds, monitoring through weekly patient care conferences    Expected Discharge Date:  6-7 days    Discharge Plan: discharge to home, referral for outpatient medication management with a psychiatrist, referral for outpatient psychotherapy, referrals as indicated    Treatment Plan Created/Updated By: Sheridan Hazel III, DO
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
## 2018-10-04 NOTE — PROGRESS NOTES
Patient came to the unit on a 201 from [address] after intentional OD on a bottle of trazodone  Patient verbalized that she called 911  Patient states " I called 911 because I didn't really want to die I just wanted the anxiety to stop, day after day I criticize myself and put myself down so much that its to much to take " Patient crying and states " this is not fair to my family they are amazing and supportive and they don't deserve this shit " Patient has a therapist and has had depression issue since the age of 14  Patient verbalized doing very poorly on abilify in the past   Patient denies SI and HI currently  Patient is cooperative  Patient UDS neg  Patient admits to OD in the past on Ativan when father pasted away  Alert and oriented  Able to make needs known  No signs or symptoms of distress  SP 1 and Q 7 minute checks will be maintained for patient safety  Will continue to monitor
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
## 2018-10-05 PROCEDURE — 99231 SBSQ HOSP IP/OBS SF/LOW 25: CPT | Performed by: PSYCHIATRY & NEUROLOGY

## 2018-10-05 RX ADMIN — VENLAFAXINE HYDROCHLORIDE 37.5 MG: 37.5 CAPSULE, EXTENDED RELEASE ORAL at 08:42

## 2018-10-05 RX ADMIN — LORAZEPAM 1 MG: 1 TABLET ORAL at 12:23

## 2018-10-05 RX ADMIN — NICOTINE 1 PATCH: 21 PATCH, EXTENDED RELEASE TRANSDERMAL at 08:42

## 2018-10-05 RX ADMIN — LORAZEPAM 1 MG: 1 TABLET ORAL at 21:50

## 2018-10-05 NOTE — PROGRESS NOTES
Around lunch time patient approached RN and verbalized feeling, "numb, restless, paranoid, and jittery, increased sweating and hot cold feeling" pt sobbing difficulty gaining control  Verbalizes anxiety 9/10  Provided patient education on anxiety and discussed ways to decrease anxiety as alternative medications but encouraged pt to take PRN medication due to increasing symptoms throughout morning  Pt did agree to try PRN ativan after discussing at length and providing education  Pt also agreeable to take hot shower and try to relax  Will evaluate effectiveness of medication  Safety precautions maintained  Will continue to monitor and assess

## 2018-10-05 NOTE — NURSING NOTE
[id number]   Pt isolative to self majority of shift; Denies current SI; pleasantly and cooperative; made informed decision to declined HS clonidine after educated on medication; no acute behavioral issues noted

## 2018-10-05 NOTE — PROGRESS NOTES
During morning assessment pt depressed and anxious  Pt reports having a poor night sleep and verbalizes having difficulty "quieting her mind" and reports feeling scared  Pt became tearful during assessment and states "mornings have always been really hard for me" Pt verbalizes that her family is her best support in life and she is dami to have them  Pt continues to express remorse for OD prior to admission and having difficulty discussing at time of assessment  Pt declined PRN medication for anxiety  Encouraged group participation and unit activities  Safety precautions maintained  Will continue to monitor and assess

## 2018-10-05 NOTE — PROGRESS NOTES
Pt reports taking a nap after "finally calming down and relaxing" Pt verbalizes relief from previous complaints earlier this shift  Ativan medication was effective and patient expressed good insight  Affect flat  Mood depressed  No tearful episodes later half of shift  Denies SI,HI, or hallucinations  Safety precautions maintained  Will continue to monitor and assess,

## 2018-10-05 NOTE — PROGRESS NOTES
Progress Note - Behavioral Health     Nita Elizondo 38 y o  female MRN: [id number]   Unit/Bed#: [facility] 254-02 Encounter: [id number]    Behavior over the last 24 hours:  Having was seen for an inpatient follow-up psychiatric visit this date  She remains tearful and depressed but denies any suicidal or homicidal ideation at this time  She reports that her appetite is good but she did have difficulty sleeping last night  She declined her clonidine but after education agreed to try it tonight  ROS: no complaints    Mental Status Evaluation:    Appearance:  casually dressed, dressed appropriately   Behavior:  Slightly guarded but cooperative   Speech:  slow, soft   Mood:  depressed   Affect:  tearful, flat   Thought Process:  logical, coherent   Associations: intact associations   Thought Content:  no overt delusions   Perceptual Disturbances: no auditory hallucinations, no visual hallucinations   Risk Potential: Suicidal ideation - None  Homicidal ideation - None  Potential for aggression - No   Sensorium:  oriented to person, place, time/date and situation   Memory:  recent and remote memory grossly intact   Consciousness:  alert and awake   Attention: decreased concentration and decreased attention span   Insight:  fair   Judgment: fair   Gait/Station: normal gait/station and normal balance   Motor Activity: no abnormal movements     Vital signs in last 24 hours:    Temp:  [97 8 °F (36 6 °C)-99 1 °F (37 3 °C)] 99 1 °F (37 3 °C)  HR:  [72-79] 72  Resp:  [16-18] 18  BP: (127-131)/(68-76) 131/68    Laboratory results:  I have personally reviewed all pertinent laboratory/tests results  Progress Toward Goals: progressing    Assessment/Plan   Principal Problem:    Major depressive disorder, recurrent severe without psychotic features ([facility] )  Active Problems:    Generalized anxiety disorder    PROVISIONAL PTSD (post-traumatic stress disorder)    Recommended Treatment:   1    Will continue current psychiatric medications and monitor patient's response  2    Will continue to encourage patient to participate in group and occupational therapy  3   Will adjust medications as needed  4    Discharge disposition and planning are ongoing  All current active medications have been reviewed  Encourage group therapy, milieu therapy and occupational therapy  Behavioral Health checks every 5 minutes      Current Facility-Administered Medications:  aluminum-magnesium hydroxide-simethicone 30 mL Oral Q4H PRN Naomy N Lodics, CRNP   benztropine 1 mg Intramuscular Q6H PRN Naomy N Lodics, CRNP   benztropine 1 mg Oral Q6H PRN Naomy N Lodics, CRNP   cloNIDine 0 1 mg Oral HS Luis Carlos Lowers III, DO   haloperidol 5 mg Oral Q6H PRN Naomy N Lodics, CRNP   haloperidol lactate 5 mg Intramuscular Q6H PRN Naomy N Lodics, CRNP   hydrOXYzine HCL 25 mg Oral Q6H PRN Naomy N Lodics, CRNP   ibuprofen 400 mg Oral Q8H PRN Naomy N Lodics, CRNP   ibuprofen 600 mg Oral Q8H PRN Naomy N Lodics, CRNP   LORazepam 2 mg Intramuscular Q6H PRN Naomy N Lodics, CRNP   LORazepam 1 mg Oral Q6H PRN Naomy N Lodics, CRNP   magnesium hydroxide 30 mL Oral Daily PRN Naomy N Lodics, CRNP   nicotine 1 patch Transdermal Daily Naomy N Lodics, CRNP   OLANZapine 10 mg Intramuscular Q3H PRN Naomy N Lodics, CRNP   risperiDONE 1 mg Oral Q3H PRN Naomy N Lodics, CRNP   traMADol 50 mg Oral Q6H PRN Naomy N Lodics, CRNP   venlafaxine 37 5 mg Oral Daily Luis Carlos Lowers III, DO       Risks / Benefits of Treatment:    Risks, benefits, and possible side effects of medications explained to patient and patient verbalizes understanding and agreement for treatment  Counseling / Coordination of Care:      Patient's progress discussed with staff in treatment team meeting  Medications, treatment progress and treatment plan reviewed with patient

## 2018-10-05 NOTE — NURSING NOTE
[phone]  Pt found to be sleeping on most of authors rounds  No acute behavioral issues noted  Q 7 min checks maintained  Falling star protocol in place

## 2018-10-06 PROCEDURE — 99232 SBSQ HOSP IP/OBS MODERATE 35: CPT | Performed by: PSYCHIATRY & NEUROLOGY

## 2018-10-06 RX ORDER — VENLAFAXINE HYDROCHLORIDE 75 MG/1
75 CAPSULE, EXTENDED RELEASE ORAL DAILY
Status: DISCONTINUED | OUTPATIENT
Start: 2018-10-07 | End: 2018-10-10 | Stop reason: HOSPADM

## 2018-10-06 RX ADMIN — LORAZEPAM 1 MG: 1 TABLET ORAL at 20:47

## 2018-10-06 RX ADMIN — VENLAFAXINE HYDROCHLORIDE 37.5 MG: 37.5 CAPSULE, EXTENDED RELEASE ORAL at 08:28

## 2018-10-06 RX ADMIN — NICOTINE 1 PATCH: 21 PATCH, EXTENDED RELEASE TRANSDERMAL at 08:28

## 2018-10-06 NOTE — PROGRESS NOTES
Progress Note - Behavioral Health     Michelle Beckett 38 y o  female MRN: [id number]   Unit/Bed#: [facility] 254-02 Encounter: [id number]    Behavior over the last 24 hours:   Oscar El was seen for an inpatient follow-up psychiatric visit this date  She relates her mood is better than before although she remained somewhat depressed  Affect is tearful  She requests her Effexor be increased  She denies any current suicidal or homicidal ideation as well as any symptoms of psychosis  Per nursing report, she is compliant with medications and meals  She denies any side effects  She was able to sleep through the night without difficulty  ROS: no complaints    Mental Status Evaluation:    Appearance:  casually dressed, dressed appropriately   Behavior:  pleasant, cooperative   Speech:  normal rate and volume   Mood:  depressed   Affect:  tearful, flat   Thought Process:  organized, logical, coherent   Associations: intact associations   Thought Content:  no overt delusions   Perceptual Disturbances: no auditory hallucinations, no visual hallucinations   Risk Potential: Suicidal ideation - None  Homicidal ideation - None  Potential for aggression - No   Sensorium:  oriented to person, place, time/date and situation   Memory:  recent and remote memory grossly intact   Consciousness:  alert and awake   Attention: decreased concentration and decreased attention span   Insight:  fair   Judgment: fair   Gait/Station: normal gait/station and normal balance   Motor Activity: no abnormal movements     Vital signs in last 24 hours:    Temp:  [98 3 °F (36 8 °C)-98 8 °F (37 1 °C)] 98 3 °F (36 8 °C)  HR:  [77-83] 77  Resp:  [18] 18  BP: (118-123)/(67) 118/67    Laboratory results:  I have personally reviewed all pertinent laboratory/tests results      Progress Toward Goals: progressing    Assessment/Plan   Principal Problem:    Major depressive disorder, recurrent severe without psychotic features ([facility] )  Active Problems:    Generalized anxiety disorder    PROVISIONAL PTSD (post-traumatic stress disorder)    Recommended Treatment:   1  Will increase Effexor to 75 mg daily  2    Will continue to monitor patient and adjust medications as needed  3    Will continue to encourage patient to participate in group and occupational therapy  4    Discharge disposition planning are ongoing  All current active medications have been reviewed  Encourage group therapy, milieu therapy and occupational therapy  Behavioral Health checks every 5 minutes      Current Facility-Administered Medications:  aluminum-magnesium hydroxide-simethicone 30 mL Oral Q4H PRN Naomy N Lodics, CRNP   benztropine 1 mg Intramuscular Q6H PRN Naomy N Lodics, CRNP   benztropine 1 mg Oral Q6H PRN Naomy N Lodics, CRNP   cloNIDine 0 1 mg Oral HS Gage Furth III, DO   haloperidol 5 mg Oral Q6H PRN Naomy N Lodics, CRNP   haloperidol lactate 5 mg Intramuscular Q6H PRN Naomy N Lodics, CRNP   hydrOXYzine HCL 25 mg Oral Q6H PRN Naomy N Lodics, CRNP   ibuprofen 400 mg Oral Q8H PRN Naomy N Lodics, CRNP   ibuprofen 600 mg Oral Q8H PRN Naomy N Lodics, CRNP   LORazepam 2 mg Intramuscular Q6H PRN Naomy N Lodics, CRNP   LORazepam 1 mg Oral Q6H PRN Naomy N Lodics, CRNP   magnesium hydroxide 30 mL Oral Daily PRN Naomy N Lodics, CRNP   nicotine 1 patch Transdermal Daily Naomy N Lodics, CRNP   OLANZapine 10 mg Intramuscular Q3H PRN Naomy N Lodics, CRNP   risperiDONE 1 mg Oral Q3H PRN Naomy N Lodics, CRNP   traMADol 50 mg Oral Q6H PRN Naomy N Lodics, CRNP   venlafaxine 37 5 mg Oral Daily Gage Furth III, DO       Risks / Benefits of Treatment:    Risks, benefits, and possible side effects of medications explained to patient and patient verbalizes understanding and agreement for treatment  Counseling / Coordination of Care:      Patient's progress reviewed with nursing staff    Medications, treatment progress and treatment plan reviewed with patient

## 2018-10-06 NOTE — PROGRESS NOTES
Pt visible on unit, social with peers and interacting appropriately with staff  Pt is flat and depressed but brightens with interaction  Denies current SI, HI, A/T/V  Cooperative  Able to answer questions appropriately  Compliant with meals and meds  Pt asking about increased dose of Effexor; provider made aware  Monitoring continues

## 2018-10-06 NOTE — PLAN OF CARE
Anxiety     Anxiety is at manageable level Progressing        Depression     Treatment Goal: Demonstrate behavioral control of depressive symptoms, verbalize feelings of improved mood/affect, and adopt new coping skills prior to discharge Progressing     Verbalize thoughts and feelings Progressing     Refrain from harming self [address] Discharge to post-acute care or home with appropriate resources Progressing        Ineffective Coping     Cooperates with admission process Progressing     Identifies ineffective coping skills Progressing     Participates in unit activities Progressing        Ineffective Coping     Participates in unit activities Progressing        SELF HARM/SUICIDALITY     Will have no self-injury during hospital stay Progressing

## 2018-10-06 NOTE — PROGRESS NOTES
Patient observed sleeping during most Q 7 minute safety checks (second portion of shift)  No suicidal ideations, acting out or homicidal behaviors  No change in medical condition or complaints voiced  Fluids maintained at bedside to promote hydration

## 2018-10-06 NOTE — PROGRESS NOTES
Marleni refused Catapres at HS  Instead  She requested 1 mg Ativan  Effective at present  Still states she remains depressed  Maintained on Q 7 minute safety checks  No acting out, suicidal ideations, and/or homicidal behaviors  No changes in medical condition  Fluids maintained at bedside to promote hydration

## 2018-10-07 PROCEDURE — 99231 SBSQ HOSP IP/OBS SF/LOW 25: CPT | Performed by: PSYCHIATRY & NEUROLOGY

## 2018-10-07 RX ADMIN — LORAZEPAM 1 MG: 1 TABLET ORAL at 20:58

## 2018-10-07 RX ADMIN — VENLAFAXINE HYDROCHLORIDE 75 MG: 75 CAPSULE, EXTENDED RELEASE ORAL at 08:35

## 2018-10-07 RX ADMIN — NICOTINE 1 PATCH: 21 PATCH, EXTENDED RELEASE TRANSDERMAL at 08:35

## 2018-10-07 NOTE — PROGRESS NOTES
Pt is visible on the unit  Social with select peers  Appears flat, depressed at times, but brightens on approach  Pt refused Catapres at HS and requested PRN Ativan 1mg, which was given and appeared effective  Pt did discuss her medication changes and the increase in Effexor  Pt currently denies SI HI AH VH  Will continue to monitor

## 2018-10-07 NOTE — PROGRESS NOTES
Pt has been visible in milieu, social with peers and interacting appropriately with staff  Denies current SI, HI, A/T/V  Pt reports feeling less anxiety and depression than previous days  Pleasant and cooperative  Compliant with meals and meds  No acute issues or concerns  Monitroing continues

## 2018-10-07 NOTE — PROGRESS NOTES
Progress Note - Behavioral Health     Lauro Paul 38 y o  female MRN: [id number]   Unit/Bed#: [facility]-02 Encounter: [id number]    Behavior over the last 24 hours:  Milind Waite was seen for an inpatient follow-up psychiatric visit this date  She relates she is feeling better than yesterday and her depression is decreasing  She is tolerating the increase in Effexor without negative side effects except for slight lightheadedness  She denies any suicidal or homicidal ideation as well as any auditory or visual hallucinations  She is sleeping and eating appropriately and feels her medication is helping improve her mood  She does not want any medication changes at this time  ROS: no complaints    Mental Status Evaluation:    Appearance:  dressed appropriately   Behavior:  pleasant, cooperative   Speech:  normal rate and volume   Mood:  less anxious, less depressed   Affect:  mood-congruent   Thought Process:  organized, logical, coherent   Associations: intact associations   Thought Content:  normal   Perceptual Disturbances: no auditory hallucinations, no visual hallucinations   Risk Potential: Suicidal ideation - None  Homicidal ideation - None  Potential for aggression - No   Sensorium:  oriented to person, place, time/date and situation   Memory:  recent and remote memory grossly intact   Consciousness:  alert and awake   Attention: attention span and concentration are age appropriate   Insight:  fair   Judgment: fair   Gait/Station: normal gait/station and normal balance   Motor Activity: no abnormal movements     Vital signs in last 24 hours:    Temp:  [98 °F (36 7 °C)-98 6 °F (37 °C)] 98 6 °F (37 °C)  HR:  [72-93] 93  Resp:  [16-18] 18  BP: (119-124)/(56-70) 119/56    Laboratory results:  I have personally reviewed all pertinent laboratory/tests results      Progress Toward Goals: progressing    Assessment/Plan   Principal Problem:    Major depressive disorder, recurrent severe without psychotic features [facility])  Active Problems:    Generalized anxiety disorder    PROVISIONAL PTSD (post-traumatic stress disorder)    Recommended Treatment:   1  Will continue current psychiatric medications as prescribed  2    Will continue to monitor patient and adjust medications as needed  3    Discharge disposition and planning are ongoing  All current active medications have been reviewed  Encourage group therapy, milieu therapy and occupational therapy  [facility] checks every 5 minutes      Current Facility-Administered Medications:  aluminum-magnesium hydroxide-simethicone 30 mL Oral Q4H PRN Naomy N Lodics, CRNP   benztropine 1 mg Intramuscular Q6H PRN Naomy N Lodics, CRNP   benztropine 1 mg Oral Q6H PRN Naomy N Lodics, CRNP   cloNIDine 0 1 mg Oral HS Derenda Cargo III, DO   haloperidol 5 mg Oral Q6H PRN Naomy N Lodics, CRNP   haloperidol lactate 5 mg Intramuscular Q6H PRN Naomy N Lodics, CRNP   hydrOXYzine HCL 25 mg Oral Q6H PRN Naomy N Lodics, CRNP   ibuprofen 400 mg Oral Q8H PRN Naomy N Lodics, CRNP   ibuprofen 600 mg Oral Q8H PRN Naomy N Lodics, CRNP   LORazepam 2 mg Intramuscular Q6H PRN Naomy N Lodics, CRNP   LORazepam 1 mg Oral Q6H PRN Naomy N Lodics, CRNP   magnesium hydroxide 30 mL Oral Daily PRN Naomy N Lodics, CRNP   nicotine 1 patch Transdermal Daily Naomy N Lodics, CRNP   OLANZapine 10 mg Intramuscular Q3H PRN Naomy N Lodics, CRNP   risperiDONE 1 mg Oral Q3H PRN Naomy N Lodics, CRNP   traMADol 50 mg Oral Q6H PRN Naomy N Lodics, CRNP   venlafaxine 75 mg Oral Daily Naomy N Maria De Jesusics, CRNP       Risks / Benefits of Treatment:    Risks, benefits, and possible side effects of medications explained to patient and patient verbalizes understanding and agreement for treatment  Counseling / Coordination of Care:      Patient's progress reviewed with nursing staff  Medications, treatment progress and treatment plan reviewed with patient

## 2018-10-08 PROCEDURE — 99231 SBSQ HOSP IP/OBS SF/LOW 25: CPT | Performed by: NURSE PRACTITIONER

## 2018-10-08 RX ORDER — DOCUSATE SODIUM 100 MG/1
100 CAPSULE, LIQUID FILLED ORAL 2 TIMES DAILY PRN
Status: DISCONTINUED | OUTPATIENT
Start: 2018-10-08 | End: 2018-10-10 | Stop reason: HOSPADM

## 2018-10-08 RX ADMIN — LORAZEPAM 1 MG: 1 TABLET ORAL at 21:34

## 2018-10-08 RX ADMIN — VENLAFAXINE HYDROCHLORIDE 75 MG: 75 CAPSULE, EXTENDED RELEASE ORAL at 08:42

## 2018-10-08 RX ADMIN — NICOTINE 1 PATCH: 21 PATCH, EXTENDED RELEASE TRANSDERMAL at 08:45

## 2018-10-08 NOTE — PROGRESS NOTES
Progress Note - Behavioral Health     Tim Luna 38 y o  female MRN: [id number]   Unit/Bed#: [facility] 254-02 Encounter: [id number]    Behavior over the last 24 hours:   Bjorn Carmichael was seen for a routine follow-up inpatient psychiatric visit this date  She relates that the Effexor is helping her mood and she is no longer feeling as depressed as when she was admitted  She is participating in groups which she also feels is helping to improve her mood  She denies any suicidal or homicidal ideation as well as any auditory or visual hallucinations  Per nursing report  She is compliant with her medications and meals and is sleeping through the night  She does not wish to be on the clonidine  She feels she does not need it  ROS: no complaints    Mental Status Evaluation:    Appearance:  casually dressed, dressed appropriately   Behavior:  normal, pleasant, cooperative   Speech:  normal rate and volume   Mood:  normal   Affect:  normal range and intensity   Thought Process:  organized, logical, coherent   Associations: intact associations   Thought Content:  normal   Perceptual Disturbances: none   Risk Potential: Suicidal ideation - None  Homicidal ideation - None  Potential for aggression - No   Sensorium:  oriented to person, place, time/date and situation   Memory:  recent and remote memory grossly intact   Consciousness:  alert and awake   Attention: attention span and concentration are age appropriate   Insight:  good   Judgment: good   Gait/Station: normal gait/station and normal balance   Motor Activity: no abnormal movements     Vital signs in last 24 hours:    Temp:  [98 °F (36 7 °C)-98 2 °F (36 8 °C)] 98 2 °F (36 8 °C)  HR:  [109-110] 109  Resp:  [16] 16  BP: (107-126)/(73-78) 126/78    Laboratory results:  I have personally reviewed all pertinent laboratory/tests results      Progress Toward Goals: progressing    Assessment/Plan   Principal Problem:    Major depressive disorder, recurrent severe without psychotic features ([facility] )  Active Problems:    Generalized anxiety disorder    PROVISIONAL PTSD (post-traumatic stress disorder)    Recommended Treatment:   1  Clonidine DC per patient request   2    Will continue to monitor patient and adjust medications as needed  3    Discharge disposition and planning are ongoing  All current active medications have been reviewed  Encourage group therapy, milieu therapy and occupational therapy  [address] checks every 5 minutes      Current Facility-Administered Medications:  aluminum-magnesium hydroxide-simethicone 30 mL Oral Q4H PRN Naomy N Lodics, CRNP   benztropine 1 mg Intramuscular Q6H PRN Naomy N Lodics, CRNP   benztropine 1 mg Oral Q6H PRN Naomy N Lodics, CRNP   cloNIDine 0 1 mg Oral HS Long Junior III, DO   docusate sodium 100 mg Oral BID PRN Naomy N Lodics, CRNP   haloperidol 5 mg Oral Q6H PRN Naomy N Lodics, CRNP   haloperidol lactate 5 mg Intramuscular Q6H PRN Naomy N Lodics, CRNP   hydrOXYzine HCL 25 mg Oral Q6H PRN Naomy N Lodics, CRNP   ibuprofen 400 mg Oral Q8H PRN Naomy N Lodics, CRNP   ibuprofen 600 mg Oral Q8H PRN Naomy N Lodics, CRNP   LORazepam 2 mg Intramuscular Q6H PRN Naomy N Lodics, CRNP   LORazepam 1 mg Oral Q6H PRN Naomy N Lodics, CRNP   magnesium hydroxide 30 mL Oral Daily PRN Naomy N Lodics, CRNP   nicotine 1 patch Transdermal Daily Naomy N Lodics, CRNP   OLANZapine 10 mg Intramuscular Q3H PRN Naomy N Lodics, CRNP   risperiDONE 1 mg Oral Q3H PRN Naomy N Lodics, CRNP   traMADol 50 mg Oral Q6H PRN Naomy N Lodics, CRNP   venlafaxine 75 mg Oral Daily Naomy N Lodics, CRNP       Risks / Benefits of Treatment:    Risks, benefits, and possible side effects of medications explained to patient and patient verbalizes understanding and agreement for treatment  Counseling / Coordination of Care:      Patient's progress discussed with staff in treatment team meeting    Medications, treatment progress and treatment plan reviewed with patient

## 2018-10-08 NOTE — PROGRESS NOTES
Patient is visible in the milieu, quietly social with peers  Reports her anxiety is 8/10 and contributes it to the current environment  Denies SI  Refused Scheduled Catapres and requested PRN ativan which was received  Will continue to monitor

## 2018-10-08 NOTE — PROGRESS NOTES
Pt is pleasant and bright during our interaction  Pt denies having any anxiety and states that she feels good  Pt stated she had poor sleep last night and was "tossing and turning "  No c/o pain  Compliant with meds and meals  Denies SI/HI at this time  Pt is able to make her needs known  Will continue to monitor and assess

## 2018-10-08 NOTE — PROGRESS NOTES
Patient has been sleeping through out the night  No distress noted on visual assessment  Will continue to monitor

## 2018-10-08 NOTE — PLAN OF CARE
Anxiety     Anxiety is at manageable level Progressing        Depression     Treatment Goal: Demonstrate behavioral control of depressive symptoms, verbalize feelings of improved mood/affect, and adopt new coping skills prior to discharge Progressing     Verbalize thoughts and feelings Progressing     Refrain from harming self Progressing        DISCHARGE PLANNING - CARE MANAGEMENT     Discharge to post-acute care or home with appropriate resources Progressing        Ineffective Coping     Participates in unit activities Progressing        Nutrition/Hydration-ADULT     Nutrient/Hydration intake appropriate for improving, restoring or maintaining nutritional needs Progressing        SELF HARM/SUICIDALITY     Will have no self-injury during hospital stay Progressing

## 2018-10-09 PROCEDURE — 99231 SBSQ HOSP IP/OBS SF/LOW 25: CPT | Performed by: PSYCHIATRY & NEUROLOGY

## 2018-10-09 RX ADMIN — VENLAFAXINE HYDROCHLORIDE 75 MG: 75 CAPSULE, EXTENDED RELEASE ORAL at 08:47

## 2018-10-09 RX ADMIN — NICOTINE 1 PATCH: 21 PATCH, EXTENDED RELEASE TRANSDERMAL at 09:00

## 2018-10-09 RX ADMIN — LORAZEPAM 1 MG: 1 TABLET ORAL at 21:47

## 2018-10-09 NOTE — PROGRESS NOTES
Patient has been visible on the unit  Attended and participated in evening unit activities  Denies any s/i  Patient states her biggest problem is anxiety,  States she was having "extreme" anxiety at home and was put on Abilify which made her suicidal   Since coming into the hospital she is no longer having s/i although she still reports 8/10 anxiety which she says is worse at night  She believes the unit "envoronment" is what is making her so anxious  Received prn ativan for anxiety  Medication effective as patient went to sleep shortly after receiving

## 2018-10-09 NOTE — PROGRESS NOTES
Patient has been observed sleeping for the majority of the night  Non-labored breathing and no signs of distress noted  Q7 minute checks maintained for pt safety

## 2018-10-09 NOTE — SOCIAL WORK
SW spoke with patients  Cassy Pacheco; pt is for discharge tomorrow 10/10/18 at 2pm; Pts  will  patient at 2pm; he is making appt with Dr Yina Ewing for pt to be seen in the next day or so   to call with any questions or concerns

## 2018-10-09 NOTE — PROGRESS NOTES
Progress Note - Behavioral Health     [facility] Pat 38 y o  female MRN: [id number]   Unit/Bed#: [facility] 254-02 Encounter: [id number]    Behavior over the last 24 hours:  Kitty Underwood was seen for an inpatient follow-up psychiatric visit this date  She rates her current mood as good and states her depression level is a 2/10  Per nursing report, she is attending groups and interacting appropriately with staff and peers  She is compliant with her medications and meals  She denies any suicidal or homicidal ideation as well as any auditory or visual hallucinations  She is looking for to being discharged home  ROS: no complaints    Mental Status Evaluation:    Appearance:  casually dressed, dressed appropriately   Behavior:  pleasant, cooperative, calm   Speech:  normal rate and volume   Mood:  normal   Affect:  normal range and intensity   Thought Process:  organized, logical, coherent   Associations: intact associations   Thought Content:  normal   Perceptual Disturbances: none   Risk Potential: Suicidal ideation - None  Homicidal ideation - None  Potential for aggression - No   Sensorium:  oriented to person, place, time/date and situation   Memory:  recent and remote memory grossly intact   Consciousness:  alert and awake   Attention: Within normal limits   Insight:  good   Judgment: good   Gait/Station: normal gait/station and normal balance   Motor Activity: no abnormal movements     Vital signs in last 24 hours:    Temp:  [98 °F (36 7 °C)-98 2 °F (36 8 °C)] 98 °F (36 7 °C)  HR:  [] 78  Resp:  [16] 16  BP: (114-126)/(57-78) 114/57    Laboratory results:  I have personally reviewed all pertinent laboratory/tests results  Progress Toward Goals: progressing    Assessment/Plan   Principal Problem:    Major depressive disorder, recurrent severe without psychotic features ([facility] )  Active Problems:    Generalized anxiety disorder    PROVISIONAL PTSD (post-traumatic stress disorder)    Recommended Treatment:   1  Will continue current psychiatric medications as prescribed  2    Plan is for discharge tomorrow pending any change in patient's status  All current active medications have been reviewed  Encourage group therapy, milieu therapy and occupational therapy  [address] checks every 5 minutes      Current Facility-Administered Medications:  aluminum-magnesium hydroxide-simethicone 30 mL Oral Q4H PRN Naomy N Lodics, CRNP   benztropine 1 mg Intramuscular Q6H PRN Naomy N Lodics, CRNP   benztropine 1 mg Oral Q6H PRN Naomy N Lodics, CRNP   docusate sodium 100 mg Oral BID PRN Naomy N Lodics, CRNP   haloperidol 5 mg Oral Q6H PRN Naomy N Lodics, CRNP   haloperidol lactate 5 mg Intramuscular Q6H PRN Naomy N Lodics, CRNP   hydrOXYzine HCL 25 mg Oral Q6H PRN Naomy N Lodics, CRNP   ibuprofen 400 mg Oral Q8H PRN Naomy N Lodics, CRNP   ibuprofen 600 mg Oral Q8H PRN Naomy N Lodics, CRNP   LORazepam 2 mg Intramuscular Q6H PRN Naomy N Lodics, CRNP   LORazepam 1 mg Oral Q6H PRN Naomy N Lodics, CRNP   magnesium hydroxide 30 mL Oral Daily PRN Naomy N Lodics, CRNP   nicotine 1 patch Transdermal Daily Naomy N Maria De Jesusics, CRNP   OLANZapine 10 mg Intramuscular Q3H PRN Naomy N Lodics, CRNP   risperiDONE 1 mg Oral Q3H PRN Naoym N Lodics, CRNP   traMADol 50 mg Oral Q6H PRN Naomy N Lodics, CRNP   venlafaxine 75 mg Oral Daily Naomy Prettyics, CRNP       Risks / Benefits of Treatment:    Risks, benefits, and possible side effects of medications explained to patient and patient verbalizes understanding and agreement for treatment  Counseling / Coordination of Care:      Patient's progress discussed with staff in treatment team meeting  Medications, treatment progress and treatment plan reviewed with patient

## 2018-10-09 NOTE — SOCIAL WORK
SW spoke to Gustavo at Dr Earle Dick office; Gustavo informed SW that she still needs psych eval and discharge summary before she can schedule appointment for pt  MANE informed her that we don't do discharge summaries until day of discharge  Gustavo informed SW that she can fax letter with aftercare recommendation  Gustavo informed SW that they did leave appointment for 11/15/18 with Dr Murray Peck; MANE asked about sooner appointment and was informed that their is nothing available right now but that she will put pt on cancellation list and informed SW that pt's always cancel  SW faxed letter with recommendation and psychiatric evaluation

## 2018-10-09 NOTE — PROGRESS NOTES
Patient is visible on the unit although she complains that other patient behaviors cause her anxiety to increase  Pt verbalized that overall her depression and anxiety are more controled and is looking forward to going home  Patient verbalized not having any current side effects from medications  Patient denies SI and HI currently  Patient affect bright and cooperative  Positive for morning meal  Patient has plans to return to home to live with family  Alert and oriented  Able to make needs known  No signs or symptoms of distress  SP1 and Q 7 minute checks will be maintained for patient safety  Will continue to monitor

## 2018-10-10 VITALS
HEART RATE: 67 BPM | SYSTOLIC BLOOD PRESSURE: 108 MMHG | TEMPERATURE: 97.5 F | HEIGHT: 64 IN | WEIGHT: 178 LBS | BODY MASS INDEX: 30.39 KG/M2 | RESPIRATION RATE: 16 BRPM | DIASTOLIC BLOOD PRESSURE: 68 MMHG | OXYGEN SATURATION: 93 %

## 2018-10-10 PROCEDURE — 99239 HOSP IP/OBS DSCHRG MGMT >30: CPT | Performed by: PSYCHIATRY & NEUROLOGY

## 2018-10-10 RX ORDER — VENLAFAXINE HYDROCHLORIDE 75 MG/1
75 CAPSULE, EXTENDED RELEASE ORAL DAILY
Qty: 30 CAPSULE | Refills: 0 | Status: SHIPPED | OUTPATIENT
Start: 2018-10-11 | End: 2020-07-18

## 2018-10-10 RX ORDER — NICOTINE 21 MG/24HR
1 PATCH, TRANSDERMAL 24 HOURS TRANSDERMAL DAILY
Qty: 28 PATCH | Refills: 0 | Status: SHIPPED | OUTPATIENT
Start: 2018-10-11 | End: 2022-03-28 | Stop reason: ALTCHOICE

## 2018-10-10 RX ORDER — LORAZEPAM 1 MG/1
1 TABLET ORAL DAILY PRN
Qty: 7 TABLET | Refills: 0 | Status: SHIPPED | OUTPATIENT
Start: 2018-10-10 | End: 2022-03-28 | Stop reason: ALTCHOICE

## 2018-10-10 RX ADMIN — NICOTINE 1 PATCH: 21 PATCH, EXTENDED RELEASE TRANSDERMAL at 08:15

## 2018-10-10 RX ADMIN — VENLAFAXINE HYDROCHLORIDE 75 MG: 75 CAPSULE, EXTENDED RELEASE ORAL at 08:17

## 2018-10-10 NOTE — PROGRESS NOTES
State she is feeling much better and has no suicidal ideations  Ativan 1 mg given at HS for anxiety and to assist with sleep  Effective after 30 minutes  Maintained on Q 7 minute safety checks  No acting out, suicidal ideations, and/or homicidal behaviors  No changes in medical condition or complaints voiced  Fluids maintained at bedside to promote hydration

## 2018-10-10 NOTE — DISCHARGE INSTR - APPOINTMENTS
The treatment team recommends ongoing medication management and individual therapy  Please continue with Dr Dennis Rod at [address] Phone: [phone] Fax: [phone]) for ongoing medication management  You are able to see a therapist at [facility]; however, you declined to be set up with one as you feel you can not afford it  Your next medication management appointment with Dr Dennis Rod is scheduled for Thursday November 15, 2018 at 1:00PM; you will be placed on the cancellation list and they will call you if an appointment becomes available sooner  Your summary of care will be faxed to this provider  You indicated that you see a PCP at [facility]; however, you declined to have an appointment scheduled on your behalf or to have them notified of your hospitalization

## 2018-10-10 NOTE — PLAN OF CARE
Anxiety     Anxiety is at manageable level Completed        Depression     Treatment Goal: Demonstrate behavioral control of depressive symptoms, verbalize feelings of improved mood/affect, and adopt new coping skills prior to discharge Completed     Verbalize thoughts and feelings Completed        DISCHARGE PLANNING - CARE MANAGEMENT     Discharge to post-acute care or home with appropriate resources Completed        Ineffective Coping     Participates in unit activities Completed          Pt is for discharge today and denies concerns   Agrees that goals have been met

## 2018-10-10 NOTE — DISCHARGE SUMMARY
Discharge Summary - [address] [facility] 38 y o  female MRN: [id number]  Unit/Bed#: [facility] 543-80 Encounter: [id number]     Admission Date:   Admission Orders     Ordered        10/03/18 2057  DISCHARGE READMIT Admit Patient to [address] [place] [address] (use with Discharge Readmit Navigator in [facility] Discharge Readmit scenario including from any IP unit or different campus ED to [facility])  Nurse to release order when pt  arrives to [facility]  Once                   Discharge Date: 10/10/2018  1:56 PM    Attending Psychiatrist:  Rebbeca Nageotte  Reason for Admission/HPI:   History of Present Illness   Rd Jones is a 53-year-old female patient admitted to the psychiatric unit on a 201 voluntary commitment basis due to intentional overdose  Per initial intake performed by Dr Page Armstrong in the emergency department, had the related she took 50 trazodone tablets, immediately felt remorseful, and called 911  Has a psychiatric history of major depressive disorder and generalized anxiety disorder  She was into partial hospitalization programs  She has had 2 prior overdose attempts  She has been in outpatient psychiatric treatment  She has tried multiple psychiatric medications in the past   She was taking Cymbalta and trazodone at time of admission  Hospital Course:   Idania Delaney was admitted to the Adult Psychiatric unit after being medically cleared  Once on the unit, she was seen by medical doctor for physical exam   She has was on 7 minute behavioral health checks for safety  She was encouraged to attend group and occupational therapy  Psychiatric medications were initiated and titrated to appropriate doses  Before any medications were administered risk versus benefit was discussed  Idania Delaney grew to take medications as prescribed and participate in treatment  Initially after admission, she remained tearful and depressed but denied any suicidal or homicidal ideation    Once exposed to the therapeutic milieu of the unit and placed on Effexor which has been affective in the past, Marleni's symptoms began to improve dramatically  She began interacting with staff and peers and her mood greatly improved  She was no longer feeling hopeless and overwhelmed  Because she was feeling so much better, the Psychiatric Care Team felt it would be safe and appropriate to discharge her to care on an outpatient basis  On the day of discharge, had other denied any suicidal or homicidal ideation as well as any auditory or visual hallucinations  She will be receiving psychiatric care from Dr Kati Ramos  Follow-up appointment was scheduled by unit   She was discharged this date and return home with her supportive   Mental Status at time of Discharge:     Appearance:  casually dressed   Behavior:  normal   Speech:  normal pitch and normal volume   Mood:  normal   Affect:  normal   Thought Process:  normal   Thought Content:  normal   Perceptual Disturbances: None   Risk Potential: Patient denies any suicidal or homicidal ideation  Sensorium:  person, place, time/date and situation   Cognition:  grossly intact   Consciousness:  alert and awake    Attention: attention span and concentration were age appropriate   Insight:  fair   Judgment: fair   Gait/Station: normal gait/station and normal balance   Motor Activity: no abnormal movements       Discharge Diagnosis:   Principal Problem:    Major depressive disorder, recurrent severe without psychotic features ([facility] [address] )  Active Problems:    Generalized anxiety disorder    PROVISIONAL PTSD (post-traumatic stress disorder)      Resolved Problems  Date Reviewed: 10/4/2018    None              Discharge Medications:  See after visit summary for reconciled discharge medications provided to patient and family  Discharge instructions/Information to patient and family:   See after visit summary for information provided to patient and family        Provisions for Follow-Up Care:  See after visit summary for information related to follow-up care and any pertinent home health orders  Discharge Statement   I spent 31 minutes discharging the patient  This time was spent on the day of discharge  I had direct contact with the patient on the day of discharge  Additional documentation is required if more than 30 minutes were spent on discharge

## 2018-10-10 NOTE — PROGRESS NOTES
Presents bright and pleasant  Good eye contact  Hopeful for expected discharge today  Reports her  is very supportive at took the day off to pick her up and be there for her  She denies concerns related to d/c  Does question if ativan with be prescribed on d/c as she has been using it before bed  Reports she may not need it at home where the environment is calmer but would like the option of having it as a prn  Pt denies hx of drug/alcohol abuse  Pt reports intent to remain compliant with scheduled effexor and states she is able to fill the script  Denies any current or recent suicidal ideations  Pt would like the nicotine patch on discharge as she would like to quit smoking  Declined the quitline   Pt shows good insight into situation and d/c planning

## 2018-10-10 NOTE — NURSING NOTE
This writer reviewed AVS with patient  Pt was attentive and denied questions/concerns  Prescriptions included in discharge packet and packet was placed in belongings which were inventoried for discharge

## 2018-10-10 NOTE — DISCHARGE INSTRUCTIONS
Anxiety   WHAT YOU NEED TO KNOW:   What do I need to know about anxiety? Anxiety is a condition that causes you to feel extremely worried or nervous  The feelings are so strong that they can cause problems with your daily activities or sleep  Anxiety may be triggered by something you fear, or it may happen without a cause  Family or work stress, smoking, caffeine, and alcohol can increase your risk for anxiety  Certain medicines or health conditions can also increase your risk  Anxiety can become a long-term condition if it is not managed or treated  What other common signs and symptoms may occur with anxiety? · Fatigue or muscle tightness     · Shaking, restlessness, or irritability     · Problems focusing     · Trouble sleeping     · Feeling jumpy, easily startled, or dizzy     · Rapid heartbeat or shortness of breath  What do I need to tell my healthcare provider about my anxiety? Tell your healthcare provider when your symptoms began and what triggers them  Tell your provider if anxiety affects your daily activities  Your provider will also ask about your medical history and if you have family members with a similar condition  Tell your provider about your past and present alcohol, nicotine, or drug use  What can I do to manage anxiety? You may get medicines to help you feel calm and relaxed, and to decrease your symptoms  Medicines are usually given together with therapy or other treatments  The following can help you manage anxiety:  · Talk to someone about your anxiety  Your healthcare provider may suggest counseling  Cognitive behavioral therapy can help you understand and change how you react to events that trigger your symptoms  You might feel more comfortable talking with a friend or family member about your anxiety  Choose someone you know will be supportive and encouraging  · Find ways to relax  Activities such as exercise, meditation, or listening to music can help you relax   Spend time with friends, or do things you enjoy  · Practice deep breathing  Deep breathing can help you relax when you feel anxious  Focus on taking slow, deep breaths several times a day, or during an anxiety attack  Breathe in through your nose and out through your mouth  · Create a regular sleep routine  Regular sleep can help you feel calmer during the day  Go to sleep and wake up at the same times every day  Do not watch television or use the computer right before bed  Your room should be comfortable, dark, and quiet  · Eat a variety of healthy foods  Healthy foods include fruits, vegetables, low-fat dairy products, lean meats, fish, whole-grain breads, and cooked beans  Healthy foods can help you feel less anxious and have more energy  · Exercise regularly  Exercise can increase your energy level  Exercise may also lift your mood and help you sleep better  Your healthcare provider can help you create an exercise plan  · Do not smoke  Nicotine and other chemicals in cigarettes and cigars can increase anxiety  Ask your healthcare provider for information if you currently smoke and need help to quit  E-cigarettes or smokeless tobacco still contain nicotine  Talk to your healthcare provider before you use these products  · Do not have caffeine  Caffeine can make your symptoms worse  Do not have foods or drinks that are meant to increase your energy level  · Limit or do not drink alcohol  Ask your healthcare provider if alcohol is safe for you  You may not be able to drink alcohol if you take certain anxiety or depression medicines  Limit alcohol to 1 drink per day if you are a woman  Limit alcohol to 2 drinks per day if you are a man  A drink of alcohol is 12 ounces of beer, 5 ounces of wine, or 1½ ounces of liquor  · Do not use drugs  Drugs can make your anxiety worse  It can also make anxiety hard to manage  Talk to your healthcare provider if you use drugs and want help to quit    Call 911 if:   · You have chest pain, tightness, or heaviness that may spread to your shoulders, arms, jaw, neck, or back  · You feel like hurting yourself or someone else  When should I contact my healthcare provider? · Your symptoms get worse or do not get better with treatment  · Your anxiety keeps you from doing your regular daily activities  · You have new symptoms since your last visit  · You have questions or concerns about your condition or care  CARE AGREEMENT:   You have the right to help plan your care  Learn about your health condition and how it may be treated  Discuss treatment options with your caregivers to decide what care you want to receive  You always have the right to refuse treatment  The above information is an  only  It is not intended as medical advice for individual conditions or treatments  Talk to your doctor, nurse or pharmacist before following any medical regimen to see if it is safe and effective for you  © 2017 [employer] Information is for End User's use only and may not be sold, redistributed or otherwise used for commercial purposes  All illustrations and images included in CareNotes® are the copyrighted property of [employer]  or [employer]  Depression   WHAT YOU NEED TO KNOW:   What is depression? Depression is a medical condition that causes feelings of sadness or hopelessness that do not go away  Depression may cause you to lose interest in things you used to enjoy  These feelings may interfere with your daily life  What causes or increases my risk for depression? Depression may be caused by changes in brain chemicals that affect your mood   Your risk for depression may be higher if you have any of the following:  · Stressful events such as the death of a loved one, unemployment, childhood trauma, divorce, or domestic abuse    · A chronic medical condition such as diabetes, heart disease, or cancer    · Parents, siblings, or other family members with a history of depression    · Drug or alcohol abuse  What are the signs and symptoms of depression? · Appetite changes, or weight gain or loss    · Trouble going to sleep or staying asleep, or sleeping too much    · Fatigue or lack of energy    · Feeling restless, irritable, or withdrawn    · Feeling worthless, hopeless, discouraged, or guilty    · Trouble concentrating, remembering things, doing daily tasks, or making decisions    · Thoughts about hurting or killing yourself  How is depression diagnosed? Your healthcare provider will ask about your symptoms and how long you have had them  He or she will ask if you have any family members with depression  Tell your healthcare provider about any stressful events in your life  He or she may ask about any other health conditions or medicines you take  How is depression treated? · Therapy  may be used to treat your depression  A therapist will help you learn to cope with your thoughts and feelings  This can be done alone or in a group  It may also be done with family members or a significant other  · Antidepressant medicine  may be given to improve or balance your mood  You may need to take this medicine for several weeks before you begin to feel better  Tell your healthcare provider about any side effects or problems you have with your medicine  The type or amount of medicine may need to be changed  How can I manage depression? · Get regular physical activity  Try to exercise for 30 minutes, 3 to 5 days a week  Work with your healthcare provider to develop an exercise plan that you enjoy  Physical activity may improve your symptoms  · Get enough sleep  Create a routine to help you relax before bed  You can listen to music, read, or do yoga  Try to go to bed and wake up at the same time every day  Sleep is important for emotional health       · Eat a variety of healthy foods from all of the food groups  A healthy meal plan is low in fat, salt, and added sugar  Ask your healthcare provider for more information about a meal plan that is right for you  · Do not drink alcohol or use drugs  Alcohol and drugs can make your symptoms worse  Call 911 for any of the following:   · You think about harming yourself or someone else  When should I contact my healthcare provider? · Your symptoms do not improve  · You cannot make it to your next appointment  · You have new symptoms  · You have questions or concerns about your condition or care  CARE AGREEMENT:   You have the right to help plan your care  Learn about your health condition and how it may be treated  Discuss treatment options with your caregivers to decide what care you want to receive  You always have the right to refuse treatment  The above information is an  only  It is not intended as medical advice for individual conditions or treatments  Talk to your doctor, nurse or pharmacist before following any medical regimen to see if it is safe and effective for you  © 2017 [employer] Information is for End User's use only and may not be sold, redistributed or otherwise used for commercial purposes  All illustrations and images included in CareNotes® are the copyrighted property of [employer]  or [employer]  Stress   WHAT YOU NEED TO KNOW:   What is stress? Stress is a feeling of tension or strain related to the events and pressures of everyday life  Learn to cope and control your stress to help you function in a healthy way  Stress can be caused by many different things, including any of the following:  · Loss of a loved one or a job    · Life events, such as having a baby, buying a house, or getting a divorce    · Medical conditions, such as an acute or long-term illness or a new diagnosis  What are the signs and symptoms of too much stress?   The signs and symptoms of stress are different from person to person  · Emotional:      ¨ Crying    ¨ Anxiety or nervousness    ¨ Easily upset    ¨ Edgy, angry, or impatient    ¨ Feeling overwhelmed    · Physical:      ¨ Headaches    ¨ Tiredness    ¨ Feeling restless    ¨ Sleep problems    ¨ Heartburn    · Mental:      ¨ Trouble thinking clearly or making decisions    ¨ Memory loss or forgetfulness    ¨ Constant worry    · Social:      ¨ Substance abuse    ¨ Overeating    ¨ Isolation or withdrawal from others    ¨ Decreased desire for sexual intimacy    ¨ Feeling bitter, resentful, or impatient with others  How can I manage my stress? Learn what causes you stress  Not all stress can be avoided  Instead, change how you cope with stress by doing any of the following:  · Learn relaxation techniques, such as yoga, meditation, or listening to music  Take at least 30 minutes a day to do something you enjoy  This may include taking a bath or reading a book  · Do deep breathing exercises during times of increased stress  Sit up straight and take a slow, deep breath in through your nose  Then breathe out slowly through your mouth  Take twice as long to breathe out as you do when you breathe in  Repeat this a few times until you feel calmer or more focused  · Set realistic goals for yourself  Make a list of tasks and prioritize them  Focus on one task at a time  · Talk to someone about things that upset you  Talk to a trusted friend, family member, or support group  Try to stop yourself when you think negative, angry, or discouraging thoughts  · Take time to exercise  Start slowly, such as walking 1 to 2 blocks each day  Stretch and relax your muscles often  Ask about the best exercise plan for you  · Eat a variety of healthy foods  Healthy foods include fruits, vegetables, whole-grain breads, low-fat dairy products, beans, lean meats, and fish    Call 911 for any of the following:   · You feel like hurting yourself or someone else     · You feel you are overwhelmed and can no longer handle things by yourself  When should I contact my healthcare provider? · You have trouble coping with your stress  · Your symptoms cause problems in your relationships  · You feel depressed  · You have trouble controlling your anger  · You have started to use alcohol, illegal drugs, or prescription medicines, or you increase your current use  · You have questions or concerns about your condition or care  CARE AGREEMENT:   You have the right to help plan your care  Learn about your health condition and how it may be treated  Discuss treatment options with your caregivers to decide what care you want to receive  You always have the right to refuse treatment  The above information is an  only  It is not intended as medical advice for individual conditions or treatments  Talk to your doctor, nurse or pharmacist before following any medical regimen to see if it is safe and effective for you  © 2017 [employer] Information is for End User's use only and may not be sold, redistributed or otherwise used for commercial purposes  All illustrations and images included in CareNotes® are the copyrighted property of [employer]  [employer]  Lorazepam (By mouth)   Lorazepam ([id number]-[id number]-alexys)  Treats anxiety  Brand Name(s): Ativan, LORazepam Intensol   There may be other brand names for this medicine  When This Medicine Should Not Be Used: This medicine is not right for everyone  Do not use it if you had an allergic reaction to lorazepam or similar medicines, or you are pregnant or breastfeeding, or you have acute narrow-angle glaucoma  How to Use This Medicine:   Liquid, Tablet  · Take your medicine as directed  Your dose may need to be changed several times to find what works best for you    · Oral liquid:   ¨ Measure the oral liquid medicine with a marked measuring spoon, oral syringe, or medicine cup  ¨ Mix the medicine with water, juice, soda, applesauce, or pudding  Drink or eat the mixture right away  Do not store it for later use  · This medicine should come with a Medication Guide  Ask your pharmacist for a copy if you do not have one  · Missed dose: Take a dose as soon as you remember  If you are more than 1 hour late, skip the missed dose and wait until it is time for your next dose  Do not use extra medicine to make up for a missed dose  ·   ¨ Oral liquid: Refrigerate the oral liquid  Throw away an opened bottle after 90 days  ¨ Tablets: Store the medicine in a closed container at room temperature, away from heat, moisture, and direct light  Drugs and Foods to Avoid:   Ask your doctor or pharmacist before using any other medicine, including over-the-counter medicines, vitamins, and herbal products  · Some medicines can affect how lorazepam works  Tell your doctor if you are using any of the following:   ¨ Aminophylline, clozapine, probenecid, theophylline, valproate  ¨ Medicine to treat depression or mental health problems  ¨ Medicine to treat seizures  · Do not drink alcohol while you are using this medicine  · Tell your doctor if you use anything else that makes you sleepy  Some examples are allergy medicine, narcotic pain medicine, and alcohol  Warnings While Using This Medicine:   · It is not safe to take this medicine during pregnancy  It could harm an unborn baby  Tell your doctor right away if you become pregnant  · Tell your doctor if you have kidney disease, liver disease, lung or breathing problems (such as COPD, sleep apnea), or a history of drug or alcohol abuse, depression, or seizures  · This medicine can be habit-forming  Do not use more than your prescribed dose  Call your doctor if you think your medicine is not working  · Do not stop using this medicine suddenly   Your doctor will need to slowly decrease your dose before you stop it completely  · This medicine may make you drowsy  Do not drive or do anything else that could be dangerous until you know how this medicine affects you  · Your doctor will do lab tests at regular visits to check on the effects of this medicine  Keep all appointments  · Keep all medicine out of the reach of children  Never share your medicine with anyone  Possible Side Effects While Using This Medicine:   Call your doctor right away if you notice any of these side effects:  · Allergic reaction: Itching or hives, swelling in your face or hands, swelling or tingling in your mouth or throat, chest tightness, trouble breathing  · Confusion, unusual mood or behavior, thoughts of hurting yourself  · Seizures  · Severe drowsiness or weakness, slow heartbeat, trouble breathing  · Worsening of depression  If you notice these less serious side effects, talk with your doctor:   · Dizziness, clumsiness  If you notice other side effects that you think are caused by this medicine, tell your doctor  Call your doctor for medical advice about side effects  You may report side effects to FDA at 7-312-FDA-5932  © 2017 [employer]  Information is for End User's use only and may not be sold, redistributed or otherwise used for commercial purposes  The above information is an  only  It is not intended as medical advice for individual conditions or treatments  Talk to your doctor, nurse or pharmacist before following any medical regimen to see if it is safe and effective for you  Lorazepam (By mouth)   Lorazepam ([id number]-alexys)  Treats anxiety  Brand Name(s): Ativan, LORazepam Intensol   There may be other brand names for this medicine  When This Medicine Should Not Be Used: This medicine is not right for everyone  Do not use it if you had an allergic reaction to lorazepam or similar medicines, or you are pregnant or breastfeeding, or you have acute narrow-angle glaucoma    How to Use This Medicine:   Liquid, Tablet  · Take your medicine as directed  Your dose may need to be changed several times to find what works best for you  · Oral liquid:   ¨ Measure the oral liquid medicine with a marked measuring spoon, oral syringe, or medicine cup  ¨ Mix the medicine with water, juice, soda, applesauce, or pudding  Drink or eat the mixture right away  Do not store it for later use  · This medicine should come with a Medication Guide  Ask your pharmacist for a copy if you do not have one  · Missed dose: Take a dose as soon as you remember  If you are more than 1 hour late, skip the missed dose and wait until it is time for your next dose  Do not use extra medicine to make up for a missed dose  ·   ¨ Oral liquid: Refrigerate the oral liquid  Throw away an opened bottle after 90 days  ¨ Tablets: Store the medicine in a closed container at room temperature, away from heat, moisture, and direct light  Drugs and Foods to Avoid:   Ask your doctor or pharmacist before using any other medicine, including over-the-counter medicines, vitamins, and herbal products  · Some medicines can affect how lorazepam works  Tell your doctor if you are using any of the following:   ¨ Aminophylline, clozapine, probenecid, theophylline, valproate  ¨ Medicine to treat depression or mental health problems  ¨ Medicine to treat seizures  · Do not drink alcohol while you are using this medicine  · Tell your doctor if you use anything else that makes you sleepy  Some examples are allergy medicine, narcotic pain medicine, and alcohol  Warnings While Using This Medicine:   · It is not safe to take this medicine during pregnancy  It could harm an unborn baby  Tell your doctor right away if you become pregnant  · Tell your doctor if you have kidney disease, liver disease, lung or breathing problems (such as COPD, sleep apnea), or a history of drug or alcohol abuse, depression, or seizures  · This medicine can be habit-forming   Do not use more than your prescribed dose  Call your doctor if you think your medicine is not working  · Do not stop using this medicine suddenly  Your doctor will need to slowly decrease your dose before you stop it completely  · This medicine may make you drowsy  Do not drive or do anything else that could be dangerous until you know how this medicine affects you  · Your doctor will do lab tests at regular visits to check on the effects of this medicine  Keep all appointments  · Keep all medicine out of the reach of children  Never share your medicine with anyone  Possible Side Effects While Using This Medicine:   Call your doctor right away if you notice any of these side effects:  · Allergic reaction: Itching or hives, swelling in your face or hands, swelling or tingling in your mouth or throat, chest tightness, trouble breathing  · Confusion, unusual mood or behavior, thoughts of hurting yourself  · Seizures  · Severe drowsiness or weakness, slow heartbeat, trouble breathing  · Worsening of depression  If you notice these less serious side effects, talk with your doctor:   · Dizziness, clumsiness  If you notice other side effects that you think are caused by this medicine, tell your doctor  Call your doctor for medical advice about side effects  You may report side effects to FDA at 4-312-FDA-5652  © 2017 [employer]  Information is for End User's use only and may not be sold, redistributed or otherwise used for commercial purposes  The above information is an  only  It is not intended as medical advice for individual conditions or treatments  Talk to your doctor, nurse or pharmacist before following any medical regimen to see if it is safe and effective for you  Venlafaxine (By mouth)   Venlafaxine ([id number])  Treats depression, generalized anxiety disorder, panic disorder, and social anxiety disorder     Brand Name(s): Effexor XR   There may be other brand names for this medicine  When This Medicine Should Not Be Used: This medicine is not right for everyone  Do not use it if you had an allergic reaction to venlafaxine or desvenlafaxine succinate  How to Use This Medicine:   Long Acting Capsule, Tablet, Long Acting Tablet  · Take your medicine as directed  Your dose may need to be changed several times to find what works best for you  · It is best to take the extended-release capsule at the same time each day (either in the morning or evening)  · It is best to take this medicine with food or milk  · Swallow the extended-release capsule whole  Do not crush, break, or chew it  Do not place the capsule in a liquid  · If you cannot swallow the extended-release capsule, you may open it and pour the medicine into a small amount of soft food such as pudding, yogurt, or applesauce  Stir this mixture well and swallow it without chewing  · This medicine should come with a Medication Guide  Ask your pharmacist for a copy if you do not have one  · Missed dose: Take a dose as soon as you remember  If it is almost time for your next dose, wait until then and take a regular dose  Do not take extra medicine to make up for a missed dose  · Store the medicine in a closed container at room temperature, away from heat, moisture, and direct light  Drugs and Foods to Avoid:   Ask your doctor or pharmacist before using any other medicine, including over-the-counter medicines, vitamins, and herbal products  · Do not use this medicine if you have used an MAO inhibitor within the past 14 days  Do not take an MAO inhibitor for at least 7 days after you stop this medicine  · Some medicines can affect how venlafaxine works   Tell your doctor if you are using any of the following:   ¨ Buspirone, cimetidine, fentanyl, ketoconazole, lithium, metoprolol, mirtazapine, Chinmay's wort, tramadol, or tryptophan supplements  ¨ Amphetamines  ¨ Blood thinner (including warfarin)  ¨ Diuretic (water pill)  ¨ Medicine for migraine headaches  ¨ Medicine to lose weight (including phentermine)  ¨ NSAID pain or arthritis medicine (including aspirin, celecoxib, diclofenac, ibuprofen, naproxen)  ¨ Tricyclic antidepressant  · Do not drink alcohol while you are using this medicine  · Tell your doctor if you use anything else that makes you sleepy  Some examples are allergy medicine, narcotic pain medicine, and alcohol  Warnings While Using This Medicine:   · Tell your doctor if you are pregnant or breastfeeding, or if you have kidney disease, liver disease, glaucoma, heart disease, high blood pressure, or thyroid problems  Tell your doctor if you have a history of ector, seizures, heart attack, or stroke  · This medicine can increase thoughts of suicide  Tell your doctor right away if you start to feel depressed and have thoughts about hurting yourself  · This medicine may cause the following problems:   ¨ Serotonin syndrome (when used with certain medicines)  ¨ Increased cholesterol levels  ¨ Increased blood pressure  ¨ Increased risk of bleeding problems  ¨ Low sodium levels  ¨ Interstitial lung disease and eosinophilic pneumonia  · This medicine may make you dizzy or drowsy  Do not drive or do anything that could be dangerous until you know how this medicine affects you  · Do not stop using this medicine suddenly  Your doctor will need to slowly decrease your dose before you stop it completely  · Tell any doctor or dentist who treats you that you are using this medicine  This medicine may affect certain medical test results  · Your doctor will do lab tests at regular visits to check on the effects of this medicine  Keep all appointments  · Keep all medicine out of the reach of children  Never share your medicine with anyone    Possible Side Effects While Using This Medicine:   Call your doctor right away if you notice any of these side effects:  · Allergic reaction: Itching or hives, swelling in your face or hands, swelling or tingling in your mouth or throat, chest tightness, trouble breathing  · Anxiety, restlessness, fever, sweating, muscle spasms, nausea, vomiting, diarrhea, seeing or hearing things that are not there  · Blistering, peeling, red skin rash  · Chest pain, cough, trouble breathing  · Confusion, weakness, and muscle twitching  · Eye pain, vision changes, seeing halos around lights  · Fast or pounding heartbeat  · Feeling more excited or energetic than usual  · Headache, trouble concentrating, memory problems, unsteadiness  · Seizures  · Unusual behavior, thoughts of hurting yourself or others, trouble sleeping, nervousness, unusual dreams  · Unusual bleeding or bruising  If you notice these less serious side effects, talk with your doctor:   · Dry mouth  · Mild nausea, constipation, vomiting, loss of appetite, weight loss  · Sexual problems  · Sleepiness or unusual drowsiness, dizziness  If you notice other side effects that you think are caused by this medicine, tell your doctor  Call your doctor for medical advice about side effects  You may report side effects to FDA at 6-339-FDA-1787  © 2017 [employer]  Information is for End User's use only and may not be sold, redistributed or otherwise used for commercial purposes  The above information is an  only  It is not intended as medical advice for individual conditions or treatments  Talk to your doctor, nurse or pharmacist before following any medical regimen to see if it is safe and effective for you  Trazodone (By mouth)   Trazodone (TRAZ-oh-done)  Treats depression  Brand Name(s): Olepratikro   There may be other brand names for this medicine  When This Medicine Should Not Be Used: This medicine is not right for everyone  Do not use it if you had an allergic reaction to trazodone  How to Use This Medicine:   Tablet, Long Acting Tablet  · Take your medicine as directed   Your dose may need to be changed several times to find what works best for you  · Regular tablet: Take it with or shortly after a meal or light snack  · Extended-release tablet: Take it at the same time each day, preferably at bedtime, without food  · The tablet can be swallowed whole, or you may break the tablet in half along the score line  Do not break the tablet unless your doctor tells you to  Do not crush or chew the tablet  · This medicine should come with a Medication Guide  Ask your pharmacist for a copy if you do not have one  · Missed dose: Take a dose as soon as you remember  If it is almost time for your next dose, wait until then and take a regular dose  Do not take extra medicine to make up for a missed dose  · Store the medicine in a closed container at room temperature, away from heat, moisture, and direct light  Drugs and Foods to Avoid:   Ask your doctor or pharmacist before using any other medicine, including over-the-counter medicines, vitamins, and herbal products  · Do not use trazodone if you currently take an MAO inhibitor (MAOI) or have used an MAOI in the past 14 days  · Tell your doctor if you also use any of the following:  ¨ Carbamazepine, digoxin, phenytoin, indinavir, ritonavir, buspirone, fentanyl, lithium, tryptophan, Chinmay's wort, tramadol  ¨ Medicine to treat a fungal infection (such as itraconazole, ketoconazole), a diuretic (water pill), blood pressure medicine, an NSAID pain or arthritis medicine (such as aspirin, celecoxib, diclofenac, ibuprofen, naproxen), a blood thinner (such as warfarin), other medicine for depression, or triptan medicine to treat migraine headaches  · Do not drink alcohol while you are using this medicine  · Tell your doctor if you use anything else that makes you sleepy  Some examples are allergy medicine, narcotic pain medicine, and alcohol    Warnings While Using This Medicine:   · Tell your doctor if you are pregnant or breastfeeding, or if you have kidney disease, liver disease, bleeding problems, glaucoma, heart disease, heart rhythm problems, or low blood pressure  Tell your doctor if you recently had a heart attack  · For some children, teenagers, and young adults, this medicine may increase mental or emotional problems  This may lead to thoughts of suicide and violence  Talk with your doctor right away if you have any thoughts or behavior changes that concern you  Tell your doctor if you or anyone in your family has a history of bipolar disorder or suicide attempts  · This medicine may cause the following problems:  ¨ Serotonin syndrome (more likely when used with certain other medicines)  ¨ Heart rhythm problems (QT prolongation)  ¨ Low sodium levels  ¨ Higher risk of bleeding  · Do not stop using this medicine suddenly  Your doctor will need to slowly decrease your dose before you stop it completely  · This medicine may make you dizzy or drowsy  Do not drive or do anything that could be dangerous until you know how this medicine affects you  Stand or sit up slowly if you are dizzy  · Tell any doctor or dentist who treats you that you are using this medicine  You may need to stop using this medicine several days before you have surgery or medical tests  · Your doctor will check your progress and the effects of this medicine at regular visits  Keep all appointments  · Keep all medicine out of the reach of children  Never share your medicine with anyone    Possible Side Effects While Using This Medicine:   Call your doctor right away if you notice any of these side effects:  · Allergic reaction: Itching or hives, swelling in your face or hands, swelling or tingling in your mouth or throat, chest tightness, trouble breathing  · Anxiety, restlessness, fever, sweating, muscle spasms, nausea, vomiting, diarrhea, seeing or hearing things that are not there  · Confusion, weakness, muscle twitching  · Fast, pounding, or uneven heartbeat  · Lightheadedness, dizziness, fainting  · Painful, prolonged erection of your penis  · Sudden increase in energy, feeling irritable, trouble sleeping  · Thoughts of hurting yourself or others, unusual behavior  · Unusual bleeding or bruising  If you notice these less serious side effects, talk with your doctor:   · Constipation, mild nausea  · Dry mouth  · Eye pain, vision changes, seeing halos around lights  · Headache  · Sleepiness or unusual drowsiness  If you notice other side effects that you think are caused by this medicine, tell your doctor  Call your doctor for medical advice about side effects  You may report side effects to FDA at 0-708-FDA-1583  © 2017 [employer] Information is for End User's use only and may not be sold, redistributed or otherwise used for commercial purposes  The above information is an  only  It is not intended as medical advice for individual conditions or treatments  Talk to your doctor, nurse or pharmacist before following any medical regimen to see if it is safe and effective for you
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
## 2020-01-30 ENCOUNTER — TELEPHONE (OUTPATIENT)
Dept: HEMATOLOGY ONCOLOGY | Facility: CLINIC | Age: 45
End: 2020-01-30

## 2020-01-30 NOTE — TELEPHONE ENCOUNTER
New Patient Encounter    New Patient Intake Form   Patient Details:  Brittni Joshi  1975  [id number]    Background Information:  [address] starts by opening a telephone encounter and gathering the following information   Who is calling to schedule? If not self, relationship to patient? self   Referring Provider Gena Mcclellan   What is the diagnosis? Elevated H&H   Is this diagnosis confirmed Yes   Is there a confirmed diagnosis from a biopsy/tissue reviewed by pathology? Is there any prior history of Cancer? No   If yes, please explain    When was the diagnosis? 1/2020   Is patient aware of diagnosis? Yes   Reason for visit? NP DX   Have you had any testing done? If so: when, where? Yes, [facility]   Are records in EPIC? Yes, care everywhere   Was the patient told to bring a disk? no   Scheduling Information:   Preferred [place]:  [facility]     Requesting Specific Provider? no   Are there any dates/time the patient cannot be seen? no      Miscellaneous: pt checked website for [facility], we are in network    I was unable to [place] from [place], pt no longer has that  After completing the above information, please route to Financial Counselor and the appropriate Nurse Navigator for review

## 2020-03-15 ENCOUNTER — APPOINTMENT (EMERGENCY)
Dept: ULTRASOUND IMAGING | Facility: HOSPITAL | Age: 45
End: 2020-03-15
Payer: COMMERCIAL

## 2020-03-15 ENCOUNTER — APPOINTMENT (EMERGENCY)
Dept: CT IMAGING | Facility: HOSPITAL | Age: 45
End: 2020-03-15
Payer: COMMERCIAL

## 2020-03-15 ENCOUNTER — HOSPITAL ENCOUNTER (EMERGENCY)
Facility: HOSPITAL | Age: 45
Discharge: HOME | End: 2020-03-15
Attending: EMERGENCY MEDICINE | Admitting: EMERGENCY MEDICINE
Payer: COMMERCIAL

## 2020-03-15 VITALS
HEIGHT: 64 IN | BODY MASS INDEX: 27.36 KG/M2 | HEART RATE: 82 BPM | WEIGHT: 160.27 LBS | DIASTOLIC BLOOD PRESSURE: 84 MMHG | SYSTOLIC BLOOD PRESSURE: 115 MMHG | RESPIRATION RATE: 18 BRPM | OXYGEN SATURATION: 98 %

## 2020-03-15 DIAGNOSIS — S86.119A GASTROCNEMIUS STRAIN: Primary | ICD-10-CM

## 2020-03-15 DIAGNOSIS — R20.0 NUMBNESS AND TINGLING: ICD-10-CM

## 2020-03-15 DIAGNOSIS — R20.2 NUMBNESS AND TINGLING: ICD-10-CM

## 2020-03-15 LAB
ALBUMIN SERPL BCP-MCNC: 3.5 G/DL (ref 3.5–5)
ALP SERPL-CCNC: 68 U/L (ref 46–116)
ALT SERPL W P-5'-P-CCNC: 20 U/L (ref 12–78)
ANION GAP SERPL CALCULATED.3IONS-SCNC: 7 MMOL/L (ref 4–13)
AST SERPL W P-5'-P-CCNC: 16 U/L (ref 5–45)
BASOPHILS # BLD AUTO: 0.09 THOUSANDS/ΜL (ref 0–0.1)
BASOPHILS NFR BLD AUTO: 1 % (ref 0–1)
BILIRUB DIRECT SERPL-MCNC: 0.1 MG/DL (ref 0–0.2)
BILIRUB SERPL-MCNC: 0.4 MG/DL (ref 0.2–1)
BUN SERPL-MCNC: 10 MG/DL (ref 5–25)
CALCIUM SERPL-MCNC: 9 MG/DL (ref 8.3–10.1)
CHLORIDE SERPL-SCNC: 104 MMOL/L (ref 100–108)
CO2 SERPL-SCNC: 27 MMOL/L (ref 21–32)
CREAT SERPL-MCNC: 0.73 MG/DL (ref 0.6–1.3)
EOSINOPHIL # BLD AUTO: 0.14 THOUSAND/ΜL (ref 0–0.61)
EOSINOPHIL NFR BLD AUTO: 2 % (ref 0–6)
ERYTHROCYTE [DISTWIDTH] IN BLOOD BY AUTOMATED COUNT: 14.3 % (ref 11.6–15.1)
GFR SERPL CREATININE-BSD FRML MDRD: 99 ML/MIN/1.73SQ M
GLUCOSE SERPL-MCNC: 99 MG/DL (ref 65–140)
HCT VFR BLD AUTO: 45.9 % (ref 34.8–46.1)
HGB BLD-MCNC: 15 G/DL (ref 11.5–15.4)
IMM GRANULOCYTES # BLD AUTO: 0.07 THOUSAND/UL (ref 0–0.2)
IMM GRANULOCYTES NFR BLD AUTO: 1 % (ref 0–2)
LYMPHOCYTES # BLD AUTO: 2.75 THOUSANDS/ΜL (ref 0.6–4.47)
LYMPHOCYTES NFR BLD AUTO: 30 % (ref 14–44)
MCH RBC QN AUTO: 30.5 PG (ref 26.8–34.3)
MCHC RBC AUTO-ENTMCNC: 32.7 G/DL (ref 31.4–37.4)
MCV RBC AUTO: 94 FL (ref 82–98)
MONOCYTES # BLD AUTO: 0.64 THOUSAND/ΜL (ref 0.17–1.22)
MONOCYTES NFR BLD AUTO: 7 % (ref 4–12)
NEUTROPHILS # BLD AUTO: 5.54 THOUSANDS/ΜL (ref 1.85–7.62)
NEUTS SEG NFR BLD AUTO: 59 % (ref 43–75)
NRBC BLD AUTO-RTO: 0 /100 WBCS
PLATELET # BLD AUTO: 241 THOUSANDS/UL (ref 149–390)
PMV BLD AUTO: 10.3 FL (ref 8.9–12.7)
POTASSIUM SERPL-SCNC: 4.3 MMOL/L (ref 3.5–5.3)
PROT SERPL-MCNC: 7 G/DL (ref 6.4–8.2)
RBC # BLD AUTO: 4.91 MILLION/UL (ref 3.81–5.12)
SODIUM SERPL-SCNC: 138 MMOL/L (ref 136–145)
WBC # BLD AUTO: 9.23 THOUSAND/UL (ref 4.31–10.16)

## 2020-03-15 PROCEDURE — 80048 BASIC METABOLIC PNL TOTAL CA: CPT | Performed by: EMERGENCY MEDICINE

## 2020-03-15 PROCEDURE — 96374 THER/PROPH/DIAG INJ IV PUSH: CPT

## 2020-03-15 PROCEDURE — 99284 EMERGENCY DEPT VISIT MOD MDM: CPT

## 2020-03-15 PROCEDURE — 93971 EXTREMITY STUDY: CPT

## 2020-03-15 PROCEDURE — 80076 HEPATIC FUNCTION PANEL: CPT | Performed by: EMERGENCY MEDICINE

## 2020-03-15 PROCEDURE — 85025 COMPLETE CBC W/AUTO DIFF WBC: CPT | Performed by: EMERGENCY MEDICINE

## 2020-03-15 PROCEDURE — 99285 EMERGENCY DEPT VISIT HI MDM: CPT | Performed by: EMERGENCY MEDICINE

## 2020-03-15 PROCEDURE — 36415 COLL VENOUS BLD VENIPUNCTURE: CPT | Performed by: EMERGENCY MEDICINE

## 2020-03-15 PROCEDURE — 93971 EXTREMITY STUDY: CPT | Performed by: SURGERY

## 2020-03-15 PROCEDURE — 73701 CT LOWER EXTREMITY W/DYE: CPT

## 2020-03-15 RX ORDER — KETOROLAC TROMETHAMINE 30 MG/ML
15 INJECTION, SOLUTION INTRAMUSCULAR; INTRAVENOUS ONCE
Status: COMPLETED | OUTPATIENT
Start: 2020-03-15 | End: 2020-03-15

## 2020-03-15 RX ORDER — IBUPROFEN 800 MG/1
800 TABLET ORAL 2 TIMES DAILY
Qty: 20 TABLET | Refills: 0 | Status: SHIPPED | OUTPATIENT
Start: 2020-03-15 | End: 2022-04-14

## 2020-03-15 RX ADMIN — KETOROLAC TROMETHAMINE 15 MG: 30 INJECTION, SOLUTION INTRAMUSCULAR at 12:01

## 2020-03-15 RX ADMIN — IOHEXOL 100 ML: 350 INJECTION, SOLUTION INTRAVENOUS at 12:44

## 2020-03-15 NOTE — ED NOTES
Instructed patient to place gown on and take bottoms off for ultrasound       Jenn Boyer RN  03/15/20 7563

## 2020-03-15 NOTE — ED PROVIDER NOTES
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 and she felt pain in the her calf  Patient states she can palpate a "ball" in  the middle of her calf  Patient denies any recent travel, birth control pills, or recent surgeries  Patient also states that she just started with  Tingling of her left side as well, which goes up her leg and into the left side of her face & lips  Patient denies any headache, or any history of TIAs or any history of hypertension  Patient did not take any Motrin or Advil for pain because she doesn't like to take anything with her Effexor  Patient denies any fever or chills  Prior to Admission Medications   Prescriptions Last Dose Informant Patient Reported? Taking? LORazepam (ATIVAN) 1 mg tablet   No No   Sig: Take 1 tablet (1 mg total) by mouth daily as needed for anxiety (moderate anxiety) for up to 7 days   nicotine (NICODERM CQ) 21 mg/24 hr TD 24 hr patch   No No   Sig: Place 1 patch on the skin daily   traZODone (DESYREL) 150 mg tablet   Yes No   Sig: Take 100 mg by mouth daily at bedtime     venlafaxine (EFFEXOR-XR) 75 mg 24 hr capsule   No No   Sig: Take 1 capsule (75 mg total) by mouth daily for 30 days      Facility-Administered Medications: None       Past Medical History:   Diagnosis Date    Anxiety     Depression     Suicide attempt ([facility] [address] )        History reviewed  No pertinent surgical history  Family History   Problem Relation Age of Onset    Depression Mother     Bipolar disorder Father     Schizoaffective Disorder  Sister      I have reviewed and agree with the history as documented      E-Cigarette/Vaping E-Cigarette/Vaping Substances     Social History     Tobacco Use    Smoking status: Current Every Day Smoker     Packs/day: 0 50     Types: Cigarettes    Smokeless tobacco: Never Used   Substance Use Topics    Alcohol use: No    Drug use: No       Review of Systems   Constitutional: Negative for chills and fever  HENT: Negative for congestion and rhinorrhea  Eyes: Negative for discharge and visual disturbance  Respiratory: Negative for shortness of breath and wheezing  Cardiovascular: Negative for chest pain and palpitations  Gastrointestinal: Negative for abdominal pain and vomiting  Endocrine: Negative for polydipsia and polyuria  Genitourinary: Negative for dysuria and hematuria  Musculoskeletal: Positive for arthralgias and myalgias  Negative for gait problem and neck stiffness  Skin: Negative for rash and wound  Neurological: Negative for dizziness and headaches  Psychiatric/Behavioral: Negative for confusion and suicidal ideas  Physical Exam  Physical Exam   Constitutional: She is oriented to person, place, and time  She appears well-developed and well-nourished  61-year-old white female with a chief complaint of left calf pain  Patient is sitting on the stretcher in no acute distress  HENT:   Head: Normocephalic and atraumatic  Mouth/Throat: Oropharynx is clear and moist    Eyes: Pupils are equal, round, and reactive to light  EOM are normal    Neck: Normal range of motion  Neck supple  Cardiovascular: Normal rate, regular rhythm and normal heart sounds  Pulmonary/Chest: Effort normal and breath sounds normal  No stridor  No respiratory distress  She has no wheezes  She has no rales  Abdominal: Soft  Bowel sounds are normal  There is no tenderness  There is no rebound and no guarding  Musculoskeletal: Normal range of motion  Left calf: There is tenderness to the left calf on palpation to the mid gastroc area    There is an area of fullness in the medial aspect of the gastrocnemius muscle with tenderness  Neurological: She is alert and oriented to person, place, and time  No cranial nerve deficit  Coordination normal    There is no appreciable numbness on testing both sides  Pt  does c/o some numbness /tingling L side of face/arm/leg  Skin: Skin is warm and dry  Psychiatric: She has a normal mood and affect  Nursing note and vitals reviewed        Vital Signs  ED Triage Vitals [03/15/20 0907]   Temp Pulse Respirations Blood Pressure SpO2   -- 104 16 133/82 97 %      Temp src Heart Rate Source Patient Position - Orthostatic VS BP Location FiO2 (%)   -- Monitor -- -- --      Pain Score       8           Vitals:    03/15/20 1230 03/15/20 1300 03/15/20 1315 03/15/20 1340   BP: 129/80 118/77  115/84   Pulse: 76 80 77 82         Visual Acuity      ED Medications  Medications   ketorolac (TORADOL) injection 15 mg (15 mg Intravenous Given 3/15/20 1201)   iohexol (OMNIPAQUE) 350 MG/ML injection (MULTI-DOSE) 100 mL (100 mL Intravenous Given 3/15/20 1244)       Diagnostic Studies  Results Reviewed     Procedure Component Value Units Date/Time    Hepatic function panel [[id number]]  (Normal) Collected:  03/15/20 1201    Lab Status:  Final result Specimen:  Blood from Arm, Right Updated:  03/15/20 1225     Total Bilirubin 0 40 mg/dL      Bilirubin, Direct 0 10 mg/dL      Alkaline Phosphatase 68 U/L      AST 16 U/L      ALT 20 U/L      Total Protein 7 0 g/dL      Albumin 3 5 g/dL     Basic metabolic panel [[id number]] Collected:  03/15/20 1201    Lab Status:  Final result Specimen:  Blood from Arm, Right Updated:  03/15/20 1225     Sodium 138 mmol/L      Potassium 4 3 mmol/L      Chloride 104 mmol/L      CO2 27 mmol/L      ANION GAP 7 mmol/L      BUN 10 mg/dL      Creatinine 0 73 mg/dL      Glucose 99 mg/dL      Calcium 9 0 mg/dL      eGFR 99 ml/min/1 73sq m     Narrative:       [place] guidelines for Chronic Kidney Disease (CKD):     Stage 1 with normal or high GFR (GFR > 90 mL/min/1 73 square meters)    Stage 2 Mild CKD (GFR = 60-89 mL/min/1 73 square meters)    Stage 3A Moderate CKD (GFR = 45-59 mL/min/1 73 square meters)    Stage 3B Moderate CKD (GFR = 30-44 mL/min/1 73 square meters)    Stage 4 Severe CKD (GFR = 15-29 mL/min/1 73 square meters)    Stage 5 End Stage CKD (GFR <15 mL/min/1 73 square meters)  Note: GFR calculation is accurate only with a steady state creatinine    CBC and differential [[id number]] Collected:  03/15/20 1201    Lab Status:  Final result Specimen:  Blood from Arm, Right Updated:  03/15/20 1208     WBC 9 23 Thousand/uL      RBC 4 91 Million/uL      Hemoglobin 15 0 g/dL      Hematocrit 45 9 %      MCV 94 fL      MCH 30 5 pg      MCHC 32 7 g/dL      RDW 14 3 %      MPV 10 3 fL      Platelets 765 Thousands/uL      nRBC 0 /100 WBCs      Neutrophils Relative 59 %      Immat GRANS % 1 %      Lymphocytes Relative 30 %      Monocytes Relative 7 %      Eosinophils Relative 2 %      Basophils Relative 1 %      Neutrophils Absolute 5 54 Thousands/µL      Immature Grans Absolute 0 07 Thousand/uL      Lymphocytes Absolute 2 75 Thousands/µL      Monocytes Absolute 0 64 Thousand/µL      Eosinophils Absolute 0 14 Thousand/µL      Basophils Absolute 0 09 Thousands/µL                  CT lower extremity w contrast left   Final Result by Flaco Winters MD (03/15 7690)   Focal region of decreased attenuation within the medial gastrocnemius with a striated appearance most suspicious for a grade 1 per grade 2 muscle strain  No collection to indicate infection  No evidence of deep fascial edema  Workstation performed: [id number]         VAS lower limb venous duplex study, unilateral/limited   Final Result by Kevin Simms MD (03/15 3358)                 Procedures  Procedures         ED Course     9:50 AM;  Spoke with Geneva Plata in [address] [facility] - will come in to scan pt  Intramuscular complex collection measuring 13 8 x 2 8 x 4 23   This  is likely an intramuscular hematoma, but can not rule out lesion  CT results:    IMPRESSION:  Focal region of decreased attenuation within the medial gastrocnemius with a striated appearance most suspicious for a grade 1 per grade 2 muscle strain  No collection to indicate infection  No evidence of deep fascial edema       More proximally within the left gastrocnemius there appears to be an intramuscular cyst measuring approximately 1 1 x 1 0 x 6 7 cm  This is likely an unrelated finding  I reviewed the CT scan results with the patient and told her I would give her a copy of the results as well as her blood work  I also offered patient a work note for couple days that she could rest her leg  The man that was with patient stated that she will not rest and that she will be going to work patient agreed  She stated that she did not need a work note & I told her I would write it for her anyway  However the printers were down in the emergency department at the time and I asked her to give me 5-10 minutes to  print her a copy from a  different printer  When I  went back to take her her discharge papers and copy, patient was gone out of the room  Patient was instructed to follow-up with orthopedics                  MDM     Differential diagnosis includes:  1  Left calf pain  2  Left lower extremity pain  3  Left gastrocnemius pain  4  Musculoskeletal strain   5   R/o DVT      Disposition  Final diagnoses:   Gastrocnemius strain - Intramuscular gastro cyst   Numbness and tingling     Time reflects when diagnosis was documented in both MDM as applicable and the Disposition within this note     Time User Action Codes Description Comment    3/15/2020  1:56 PM Yillio Add [S86 119A] Gastrocnemius strain     3/15/2020  1:56 PM Yillio Modify [U57 988P] Gastrocnemius strain     3/15/2020  6:14 PM Yillio Add [K86 2,  K86 3] Cyst and pseudocyst of pancreas     3/15/2020  6:14 PM Yillio Remove [J98 2,  K86 3] Cyst and pseudocyst of pancreas     3/15/2020  6:16 PM Yasmani Carrillo Add [X10 69] Cyst near tailbone     3/15/2020  6:16 PM Yasmani Carrillo Remove [L05 91] Cyst near tailbone     3/15/2020  6:16 PM Yasmani Carrillo Modify [K76 691E] Gastrocnemius strain Intramuscular gastro cyst    3/15/2020  6:17 PM Yasmani Carrillo Add [R20 0,  R20 2] Numbness and tingling       ED Disposition     ED Disposition Condition Date/Time Comment    Discharge Good Sun Mar 15, 2020  2:05 PM Hansen  discharge to home/self care  Follow-up Information     Follow up With Specialties Details Why [address], MD Orthopedic Surgery   [address] [place] [address]  [address]  [place] [address]  [phone]            Discharge Medication List as of 3/15/2020  2:16 PM      START taking these medications    Details   ibuprofen (MOTRIN) 800 mg tablet Take 1 tablet (800 mg total) by mouth 2 (two) times a day for 10 days, Starting Sun 3/15/2020, Until Wed 3/25/2020, Print         CONTINUE these medications which have NOT CHANGED    Details   LORazepam (ATIVAN) 1 mg tablet Take 1 tablet (1 mg total) by mouth daily as needed for anxiety (moderate anxiety) for up to 7 days, Starting Wed 10/10/2018, Until Wed 10/17/2018, Print      nicotine (NICODERM CQ) 21 mg/24 hr TD 24 hr patch Place 1 patch on the skin daily, Starting u 10/11/2018, Print      traZODone (DESYREL) 150 mg tablet Take 100 mg by mouth daily at bedtime  , Historical Med      venlafaxine (EFFEXOR-XR) 75 mg 24 hr capsule Take 1 capsule (75 mg total) by mouth daily for 30 days, Starting Thu 10/11/2018, Until Sat 11/10/2018, Print           No discharge procedures on file      PDMP Review     None          ED Provider  Electronically Signed by           Tate Dietrich,   03/15/20 6246

## 2020-07-18 ENCOUNTER — OFFICE VISIT (OUTPATIENT)
Dept: OBGYN CLINIC | Facility: CLINIC | Age: 45
End: 2020-07-18
Payer: COMMERCIAL

## 2020-07-18 VITALS
BODY MASS INDEX: 27.64 KG/M2 | HEART RATE: 94 BPM | TEMPERATURE: 98.5 F | WEIGHT: 161 LBS | DIASTOLIC BLOOD PRESSURE: 88 MMHG | SYSTOLIC BLOOD PRESSURE: 127 MMHG

## 2020-07-18 DIAGNOSIS — S86.812A STRAIN OF CALF MUSCLE, LEFT, INITIAL ENCOUNTER: Primary | ICD-10-CM

## 2020-07-18 PROCEDURE — 99203 OFFICE O/P NEW LOW 30 MIN: CPT | Performed by: FAMILY MEDICINE

## 2020-07-18 PROCEDURE — 20610 DRAIN/INJ JOINT/BURSA W/O US: CPT | Performed by: FAMILY MEDICINE

## 2020-07-18 RX ORDER — LIDOCAINE HYDROCHLORIDE 10 MG/ML
5 INJECTION, SOLUTION INFILTRATION; PERINEURAL
Status: COMPLETED | OUTPATIENT
Start: 2020-07-18 | End: 2020-07-18

## 2020-07-18 RX ORDER — NAPROXEN 500 MG/1
500 TABLET ORAL 2 TIMES DAILY WITH MEALS
Qty: 30 TABLET | Refills: 0 | Status: SHIPPED | OUTPATIENT
Start: 2020-07-18 | End: 2022-03-28 | Stop reason: ALTCHOICE

## 2020-07-18 RX ADMIN — LIDOCAINE HYDROCHLORIDE 5 ML: 10 INJECTION, SOLUTION INFILTRATION; PERINEURAL at 12:40

## 2020-07-18 NOTE — PROGRESS NOTES
Assessment/Plan:  Assessment/Plan   Diagnoses and all orders for this visit:    Strain of calf muscle, left, initial encounter  -     Cam Boot  -     naproxen (NAPROSYN) 500 mg tablet; Take 1 tablet (500 mg total) by mouth 2 (two) times a day with meals  -     Ambulatory referral to Physical Therapy; Future  -     Large joint arthrocentesis: L knee        49-year-old active female with left calf pain of more than 4 months duration  Discussed with patient physical exam, imaging studies, impression and plan  CT scan of left lower extremities noted for intramuscular cyst of the medial gastrocnemius  Physical exam is unremarkable for bony or soft tissue tenderness of the knee  She has normal range of motion and strength of the knee  She has tenderness at the proximal medial and proximal lateral aspect of the calf  She has intact range of motion and strength of the ankle  There is reproduction of pain at the proximal calf with forced dorsiflexion of the ankle  Clinical impression that she is symptomatic from strain of the calf resulting in intramuscular cyst   I discussed treatment in form of aspiration, anti-inflammatory, supplements, protection, and physical therapy to which she agreed  I aspirated 5 cc of viscous serosanguineous fluid from the left proximal calf without complication  I provided with Cam boot to wear for activities of prolonged standing and ambulation  She is to take naproxen 500 mg twice daily with food consistently for 2 weeks and do not time to taking improvement relieve, tumeric 500 mg twice daily, tart cherry 1000 mg daily, and start physical therapy as soon as possible and do home exercises as directed  She will follow up in 4 weeks at which point she will be re-evaluated  Subjective:   Patient ID: Michelle Beckett is a 53 y o  female    Chief Complaint   Patient presents with    Left Knee - Pain       49-year-old active female presents for evaluation of left leg pain of 4 months duration  She denies any particular trauma or inciting event  Pain described as localized to the posterior aspect of the calf, sudden onset, achy and pressure-like, radiating distally along the posterior aspect of lower leg, associated swelling, and worse with bearing weight  She presented to the emergency room vascular study was unremarkable for DVT  CT scan of lower extremity was noted for intramuscular cyst of the medial gastrocnemius, and grade 1-2 strain of the gastrocnemius  Since that time she has been elevating, icing, and trying to stretch  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 and this also exacerbates her pain  Leg Pain   This is a new problem  The current episode started more than 1 month ago  The problem occurs intermittently  The problem has been unchanged  Associated symptoms include numbness  Pertinent negatives include no abdominal pain, arthralgias, chest pain, chills, fever, joint swelling, rash, sore throat or weakness  The symptoms are aggravated by standing and walking  She has tried rest, NSAIDs and ice for the symptoms  The treatment provided mild relief  The following portions of the patient's history were reviewed and updated as appropriate: She  has a past medical history of Anxiety, Depression, and Suicide attempt ([facility] [address] )  She  has no past surgical history on file  Her family history includes Bipolar disorder in her father; Depression in her mother; Schizoaffective Disorder  in her sister  She  reports that she has been smoking cigarettes  She has been smoking about 0 50 packs per day  She has never used smokeless tobacco  She reports that she does not drink alcohol or use drugs  She is allergic to oxycodone-acetaminophen; pentosan polysulfate; pollen extract; and sulfa antibiotics       Review of Systems   Constitutional: Negative for chills and fever  HENT: Negative for sore throat  Eyes: Negative for visual disturbance  Respiratory: Negative for shortness of breath  Cardiovascular: Negative for chest pain  Gastrointestinal: Negative for abdominal pain  Genitourinary: Negative for flank pain  Musculoskeletal: Negative for arthralgias and joint swelling  Skin: Negative for rash and wound  Neurological: Positive for numbness  Negative for weakness  Hematological: Does not bruise/bleed easily  Psychiatric/Behavioral: Negative for self-injury  Objective:  Vitals:    07/18/20 1141   BP: 127/88   BP Location: Left arm   Patient Position: Sitting   Cuff Size: Standard   Pulse: 94   Temp: 98 5 °F (36 9 °C)   Weight: 73 kg (161 lb)     Right Ankle Exam     Other   Sensation: normal       Left Ankle Exam     Tenderness   The patient is experiencing no tenderness  Swelling: none    Muscle Strength   Dorsiflexion:  5/5   Plantar flexion:  5/5     Other   Sensation: normal      Left Knee Exam     Muscle Strength   The patient has normal left knee strength  Tenderness   Left knee tenderness location: Proximal calf  Range of Motion   The patient has normal left knee ROM  Tests   Varus: negative Valgus: negative    Other   Swelling: none    Comments:  Negative patellar inhibition and grind      Right Hip Exam     Muscle Strength   Flexion: 5/5     Tests   VETO: negative    Comments:  Negative FADDIR      Left Hip Exam     Muscle Strength   Flexion: 5/5     Tests   VETO: negative    Comments:  Negative FADDIR          Strength/Myotome Testing     Left Ankle/Foot   Dorsiflexion: 5  Plantar flexion: 5      Physical Exam   Constitutional: She is oriented to person, place, and time  She appears well-developed  No distress  HENT:   Head: Normocephalic  Eyes: Conjunctivae are normal    Neck: No tracheal deviation present  Cardiovascular: Normal rate  Pulmonary/Chest: Effort normal  No respiratory distress  Abdominal: She exhibits no distension     Neurological: She is alert and oriented to person, place, and time    Skin: Skin is warm and dry  Psychiatric: She has a normal mood and affect  Her behavior is normal    Nursing note and vitals reviewed  I have personally reviewed pertinent films in PACS and my interpretation is Intramuscular cyst of the proximal calf        Large joint arthrocentesis: L knee  Date/Time: 7/18/2020 12:40 PM  Consent given by: patient  Site marked: site marked  Timeout: Immediately prior to procedure a time out was called to verify the correct patient, procedure, equipment, support staff and site/side marked as required   Supporting Documentation  Indications: pain and joint swelling   Procedure Details  Location: knee - L knee (Left calf)  Preparation: Patient was prepped and draped in the usual sterile fashion  Needle size: 18 G  Ultrasound guidance: no  Approach: medial  Medications administered: 5 mL lidocaine 1 %    Aspirate amount: 5 mL  Aspirate: serous and blood-tinged    Patient tolerance: patient tolerated the procedure well with no immediate complications  Dressing:  Sterile dressing applied

## 2020-07-18 NOTE — LETTER
July 18, 2020     Patient: Chelsi Gibson   YOB: 1975   Date of Visit: 7/18/2020       To Whom it May Concern:    Chelsi Gibson is under my professional care  She was seen in my office on 7/18/2020  She may work with restrictions:  -Wear CAM boot at all times  -Allow to sit for 30 minutes every two hours    She will be re-evaluated in 4 weeks  If you have any questions or concerns, please don't hesitate to call           Sincerely,          [employer], DO        CC: No Recipients

## 2020-07-18 NOTE — LETTER
July 18, 2020     Marni Padron MD  [address] [place] [id number] [facility]  [facility] [address]    Patient: Rosanne Wong   YOB: 1975   Date of Visit: 7/18/2020       Dear Dr Tomás Eng: Thank you for referring Rosanne Wong to me for evaluation  Below are my notes for this consultation  If you have questions, please do not hesitate to call me  I look forward to following your patient along with you  Sincerely,        [employer], DO        CC: No Recipients  [employer], DO  7/18/2020  1:29 PM  Sign at close encounter  Assessment/Plan:  Assessment/Plan   Diagnoses and all orders for this visit:    Strain of calf muscle, left, initial encounter  -     Cam Boot  -     naproxen (NAPROSYN) 500 mg tablet; Take 1 tablet (500 mg total) by mouth 2 (two) times a day with meals  -     Ambulatory referral to Physical Therapy; Future  -     Large joint arthrocentesis: L knee        49-year-old active female with left calf pain of more than 4 months duration  Discussed with patient physical exam, imaging studies, impression and plan  CT scan of left lower extremities noted for intramuscular cyst of the medial gastrocnemius  Physical exam is unremarkable for bony or soft tissue tenderness of the knee  She has normal range of motion and strength of the knee  She has tenderness at the proximal medial and proximal lateral aspect of the calf  She has intact range of motion and strength of the ankle  There is reproduction of pain at the proximal calf with forced dorsiflexion of the ankle  Clinical impression that she is symptomatic from strain of the calf resulting in intramuscular cyst   I discussed treatment in form of aspiration, anti-inflammatory, supplements, protection, and physical therapy to which she agreed  I aspirated 5 cc of viscous serosanguineous fluid from the left proximal calf without complication  I provided with Cam boot to wear for activities of prolonged standing and ambulation    She is to take naproxen 500 mg twice daily with food consistently for 2 weeks and do not time to taking improvement relieve, tumeric 500 mg twice daily, tart cherry 1000 mg daily, and start physical therapy as soon as possible and do home exercises as directed  She will follow up in 4 weeks at which point she will be re-evaluated  Subjective:   Patient ID: Frida Mckeon is a 53 y o  female  Chief Complaint   Patient presents with    Left Knee - Pain       78-year-old active female presents for evaluation of left leg pain of 4 months duration  She denies any particular trauma or inciting event  Pain described as localized to the posterior aspect of the calf, sudden onset, achy and pressure-like, radiating distally along the posterior aspect of lower leg, associated swelling, and worse with bearing weight  She presented to the emergency room vascular study was unremarkable for DVT  CT scan of lower extremity was noted for intramuscular cyst of the medial gastrocnemius, and grade 1-2 strain of the gastrocnemius  Since that time she has been elevating, icing, and trying to stretch  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 and this also exacerbates her pain  Leg Pain   This is a new problem  The current episode started more than 1 month ago  The problem occurs intermittently  The problem has been unchanged  Associated symptoms include numbness  Pertinent negatives include no abdominal pain, arthralgias, chest pain, chills, fever, joint swelling, rash, sore throat or weakness  The symptoms are aggravated by standing and walking  She has tried rest, NSAIDs and ice for the symptoms  The treatment provided mild relief  The following portions of the patient's history were reviewed and updated as appropriate: She  has a past medical history of Anxiety, Depression, and Suicide attempt ([facility] [address] )  She  has no past surgical history on file    Her family history includes Bipolar disorder in her father; Depression in her mother; Schizoaffective Disorder  in her sister  She  reports that she has been smoking cigarettes  She has been smoking about 0 50 packs per day  She has never used smokeless tobacco  She reports that she does not drink alcohol or use drugs  She is allergic to oxycodone-acetaminophen; pentosan polysulfate; pollen extract; and sulfa antibiotics       Review of Systems   Constitutional: Negative for chills and fever  HENT: Negative for sore throat  Eyes: Negative for visual disturbance  Respiratory: Negative for shortness of breath  Cardiovascular: Negative for chest pain  Gastrointestinal: Negative for abdominal pain  Genitourinary: Negative for flank pain  Musculoskeletal: Negative for arthralgias and joint swelling  Skin: Negative for rash and wound  Neurological: Positive for numbness  Negative for weakness  Hematological: Does not bruise/bleed easily  Psychiatric/Behavioral: Negative for self-injury  Objective:  Vitals:    07/18/20 1141   BP: 127/88   BP Location: Left arm   Patient Position: Sitting   Cuff Size: Standard   Pulse: 94   Temp: 98 5 °F (36 9 °C)   Weight: 73 kg (161 lb)     Right Ankle Exam     Other   Sensation: normal       Left Ankle Exam     Tenderness   The patient is experiencing no tenderness  Swelling: none    Muscle Strength   Dorsiflexion:  5/5   Plantar flexion:  5/5     Other   Sensation: normal      Left Knee Exam     Muscle Strength   The patient has normal left knee strength  Tenderness   Left knee tenderness location: Proximal calf  Range of Motion   The patient has normal left knee ROM      Tests   Varus: negative Valgus: negative    Other   Swelling: none    Comments:  Negative patellar inhibition and grind      Right Hip Exam     Muscle Strength   Flexion: 5/5     Tests   VETO: negative    Comments:  Negative FADDIR      Left Hip Exam     Muscle Strength   Flexion: 5/5     Tests   VETO: negative    Comments:  Negative FADDIR          Strength/Myotome Testing     Left Ankle/Foot   Dorsiflexion: 5  Plantar flexion: 5      Physical Exam   Constitutional: She is oriented to person, place, and time  She appears well-developed  No distress  HENT:   Head: Normocephalic  Eyes: Conjunctivae are normal    Neck: No tracheal deviation present  Cardiovascular: Normal rate  Pulmonary/Chest: Effort normal  No respiratory distress  Abdominal: She exhibits no distension  Neurological: She is alert and oriented to person, place, and time  Skin: Skin is warm and dry  Psychiatric: She has a normal mood and affect  Her behavior is normal    Nursing note and vitals reviewed  I have personally reviewed pertinent films in PACS and my interpretation is Intramuscular cyst of the proximal calf        Large joint arthrocentesis: L knee  Date/Time: 7/18/2020 12:40 PM  Consent given by: patient  Site marked: site marked  Timeout: Immediately prior to procedure a time out was called to verify the correct patient, procedure, equipment, support staff and site/side marked as required   Supporting Documentation  Indications: pain and joint swelling   Procedure Details  Location: knee - L knee (Left calf)  Preparation: Patient was prepped and draped in the usual sterile fashion  Needle size: 18 G  Ultrasound guidance: no  Approach: medial  Medications administered: 5 mL lidocaine 1 %    Aspirate amount: 5 mL  Aspirate: serous and blood-tinged    Patient tolerance: patient tolerated the procedure well with no immediate complications  Dressing:  Sterile dressing applied

## 2020-09-02 ENCOUNTER — OFFICE VISIT (OUTPATIENT)
Dept: OBGYN CLINIC | Facility: CLINIC | Age: 45
End: 2020-09-02

## 2020-09-02 VITALS
DIASTOLIC BLOOD PRESSURE: 88 MMHG | HEART RATE: 97 BPM | TEMPERATURE: 96.6 F | SYSTOLIC BLOOD PRESSURE: 126 MMHG | WEIGHT: 160 LBS | HEIGHT: 64 IN | BODY MASS INDEX: 27.31 KG/M2

## 2020-09-02 DIAGNOSIS — M79.662 PAIN OF LEFT CALF: Primary | ICD-10-CM

## 2020-09-02 PROCEDURE — 99213 OFFICE O/P EST LOW 20 MIN: CPT | Performed by: FAMILY MEDICINE

## 2020-09-02 NOTE — PROGRESS NOTES
Assessment/Plan:  Assessment/Plan   Diagnoses and all orders for this visit:    Pain of left calf  -     MRI tibia fibula left wo contrast; Future        49-year-old active female with left calf pain of more than five months duration  Discussed with patient physical exam, impression and plan  Physical noted for tenderness at the proximal and medial aspects of the gastrocnemius  She demonstrates intact range of motion and strength with plantar flexion and dorsiflexion  There is mild reduction of pain at the proximal calf with forced dorsiflexion and resisted plantar flexion  She is intact neurovascularly in the left lower extremity  She is now more than 5 months since onset of symptoms and has not improved despite conservative management a form of prescribed naproxen 100 mg twice daily, home stretching, and more than 3 weeks of protection in Cam boot  At this time I will refer her for MRI of the left lower extremity to evaluate for occult osseous and soft tissue abnormality, as more invasive management may be warranted  She will follow up with me after getting MRI done  Subjective:   Patient ID: Chelsi Gibson is a 53 y o  female  Chief Complaint   Patient presents with    Left Lower Leg - Follow-up, Pain, Swelling         49-year-old active female following up for left calf pain of more than 5 months duration  She was last seen by me nearly 6 weeks ago at which point she underwent aspiration of fluid collection in the left proximal calf  She was placed in Cam boot, referred to formal physical therapy, and prescribed naproxen 500 mg twice daily  After that visit she wore Cam boot for 3 weeks and then start to ambulate regularly  For about one week she was out pain or discomfort    Several days ago she started experience pain described as generalized to the proximal calf, aching and throbbing, nonradiating, worse with bearing weight and ambulating, associated with swelling, associated with numbness, and improved with rest      Leg Pain   This is a new problem  The current episode started more than 1 month ago  The problem occurs intermittently  The problem has been waxing and waning  Associated symptoms include arthralgias and numbness  Pertinent negatives include no joint swelling or weakness  The symptoms are aggravated by standing and walking  She has tried rest, NSAIDs and position changes (Aspiration, corticosteroid injection) for the symptoms  The treatment provided mild relief  Review of Systems   Musculoskeletal: Positive for arthralgias  Negative for joint swelling  Neurological: Positive for numbness  Negative for weakness  Objective:  Vitals:    09/02/20 1020   BP: 126/88   Pulse: 97   Temp: (!) 96 6 °F (35 9 °C)   Weight: 72 6 kg (160 lb)   Height: 5' 4" (1 626 m)     Left Ankle Exam     Tenderness   The patient is experiencing no tenderness  Swelling: none    Range of Motion   The patient has normal left ankle ROM  Muscle Strength   The patient has normal left ankle strength  Left Knee Exam     Muscle Strength   The patient has normal left knee strength  Strength/Myotome Testing     Left Ankle/Foot   Normal strength      Physical Exam  Vitals signs and nursing note reviewed  Constitutional:       General: She is not in acute distress  Appearance: She is well-developed  HENT:      Head: Normocephalic  Eyes:      Conjunctiva/sclera: Conjunctivae normal    Neck:      Trachea: No tracheal deviation  Cardiovascular:      Rate and Rhythm: Normal rate  Pulmonary:      Effort: Pulmonary effort is normal  No respiratory distress  Abdominal:      General: There is no distension  Musculoskeletal:      Comments: Left lower leg  -tenderness of proximal and mid gastrocnemius   Skin:     General: Skin is warm and dry  Neurological:      Mental Status: She is alert and oriented to person, place, and time     Psychiatric:         Behavior: Behavior normal

## 2020-09-02 NOTE — LETTER
September 2, 2020     Patient: Miky Koroma   YOB: 1975   Date of Visit: 9/2/2020       To Whom it May Concern:    Miky Koroma is under my professional care  She was seen in my office on 9/2/2020  If you have any questions or concerns, please don't hesitate to call           Sincerely,          [employer], DO        CC: No Recipients

## 2020-09-09 ENCOUNTER — HOSPITAL ENCOUNTER (OUTPATIENT)
Dept: RADIOLOGY | Facility: IMAGING CENTER | Age: 45
Discharge: HOME | End: 2020-09-09
Payer: COMMERCIAL

## 2020-09-09 DIAGNOSIS — M79.662 PAIN OF LEFT CALF: ICD-10-CM

## 2020-09-09 PROCEDURE — G1004 CDSM NDSC: HCPCS

## 2020-09-09 PROCEDURE — 73718 MRI LOWER EXTREMITY W/O DYE: CPT

## 2020-09-10 ENCOUNTER — OFFICE VISIT (OUTPATIENT)
Dept: OBGYN CLINIC | Facility: CLINIC | Age: 45
End: 2020-09-10

## 2020-09-10 VITALS
DIASTOLIC BLOOD PRESSURE: 76 MMHG | BODY MASS INDEX: 27.31 KG/M2 | TEMPERATURE: 98.5 F | SYSTOLIC BLOOD PRESSURE: 109 MMHG | WEIGHT: 160 LBS | HEIGHT: 64 IN | HEART RATE: 102 BPM

## 2020-09-10 DIAGNOSIS — S86.112D STRAIN OF GASTROCNEMIUS MUSCLE OF LEFT LOWER EXTREMITY, SUBSEQUENT ENCOUNTER: Primary | ICD-10-CM

## 2020-09-10 PROCEDURE — 99213 OFFICE O/P EST LOW 20 MIN: CPT | Performed by: FAMILY MEDICINE

## 2020-09-10 NOTE — PROGRESS NOTES
Assessment/Plan:  Assessment/Plan   Diagnoses and all orders for this visit:    Strain of gastrocnemius muscle of left lower extremity, subsequent encounter  -     Ambulatory referral to Sports Medicine; Future    Other orders  -     Cancel: Ambulatory referral to Physical Therapy; Future        57-year-old active female with left calf pain of more than 5 months duration  Discussed with patient MRI results, impression and plan  MRI of the left lower extremity noted for grade 1 strain medial head of the left gastrocnemius with of long intramuscular cyst measuring 0 7 x 0 6 x 5 6 cm in proximal head of medial gastrocnemius  I recommend she proceed with physical therapy and home exercises as soon as possible  She was also given option of repeat aspiration of the cyst   I will refer to Sports Medicine Dr Sathish Meyer for evaluation and potential ultrasound-guided aspiration  Subjective:   Patient ID: Gisel Brothers is a 53 y o  female  Chief Complaint   Patient presents with    Left Lower Leg - Follow-up       57-year-old active female follow-up for left calf pain of more than 5 months duration  She was last seen by me 1 week ago at which point she was referred for MRI of the left lower extremity  Since her last visit she has had minimal discomfort  She reports having pain described as generalized to the calf, achy and sore, mild intensity, nonradiating, worse with prolonged activity, and improved with resting  Leg Pain   This is a new problem  The current episode started more than 1 month ago  The problem occurs intermittently  The problem has been waxing and waning  Associated symptoms include arthralgias and numbness  Pertinent negatives include no joint swelling or weakness  The symptoms are aggravated by standing and walking  She has tried rest, ice and NSAIDs for the symptoms  The treatment provided mild relief  Review of Systems   Musculoskeletal: Positive for arthralgias   Negative for joint swelling  Neurological: Positive for numbness  Negative for weakness  Objective:  Vitals:    09/10/20 1501   BP: 109/76   Pulse: 102   Temp: 98 5 °F (36 9 °C)   Weight: 72 6 kg (160 lb)   Height: 5' 4" (1 626 m)     Right Ankle Exam     Muscle Strength   Dorsiflexion:  5/5  Plantar flexion:  5/5      Left Ankle Exam     Muscle Strength   Dorsiflexion:  5/5   Plantar flexion:  5/5           Active Range of Motion   Left Ankle/Foot   Dorsiflexion (kf): 10 degrees with pain  Plantar flexion: WFL    Right Ankle/Foot   Dorsiflexion (kf): WFL  Plantar flexion: [facility]    Strength/Myotome Testing     Left Ankle/Foot   Dorsiflexion: 5  Plantar flexion: 5    Right Ankle/Foot   Dorsiflexion: 5  Plantar flexion: 5      Physical Exam  Vitals signs and nursing note reviewed  Constitutional:       General: She is not in acute distress  Appearance: She is well-developed  HENT:      Head: Normocephalic  Eyes:      Conjunctiva/sclera: Conjunctivae normal    Neck:      Trachea: No tracheal deviation  Cardiovascular:      Rate and Rhythm: Normal rate  Pulmonary:      Effort: Pulmonary effort is normal  No respiratory distress  Abdominal:      General: There is no distension  Skin:     General: Skin is warm and dry  Neurological:      Mental Status: She is alert and oriented to person, place, and time     Psychiatric:         Behavior: Behavior normal          I have personally reviewed pertinent films in PACS and my interpretation is Intramuscular cyst

## 2020-10-02 ENCOUNTER — OFFICE VISIT (OUTPATIENT)
Dept: OBGYN CLINIC | Facility: OTHER | Age: 45
End: 2020-10-02

## 2020-10-02 VITALS
HEART RATE: 101 BPM | HEIGHT: 64 IN | DIASTOLIC BLOOD PRESSURE: 88 MMHG | BODY MASS INDEX: 27.31 KG/M2 | TEMPERATURE: 98.4 F | SYSTOLIC BLOOD PRESSURE: 133 MMHG | WEIGHT: 160 LBS

## 2020-10-02 DIAGNOSIS — M79.89 CALF SWELLING: Primary | ICD-10-CM

## 2020-10-02 DIAGNOSIS — S80.12XA HEMATOMA OF LEFT LOWER EXTREMITY, INITIAL ENCOUNTER: ICD-10-CM

## 2020-10-02 PROCEDURE — 76942 ECHO GUIDE FOR BIOPSY: CPT | Performed by: ORTHOPAEDIC SURGERY

## 2020-10-02 PROCEDURE — 99215 OFFICE O/P EST HI 40 MIN: CPT | Performed by: ORTHOPAEDIC SURGERY

## 2021-01-14 ENCOUNTER — OFFICE VISIT (OUTPATIENT)
Dept: OBGYN CLINIC | Facility: CLINIC | Age: 46
End: 2021-01-14

## 2021-01-14 VITALS
BODY MASS INDEX: 27.31 KG/M2 | SYSTOLIC BLOOD PRESSURE: 115 MMHG | HEART RATE: 93 BPM | WEIGHT: 160 LBS | DIASTOLIC BLOOD PRESSURE: 81 MMHG | HEIGHT: 64 IN

## 2021-01-14 DIAGNOSIS — S86.812D STRAIN OF CALF MUSCLE, LEFT, SUBSEQUENT ENCOUNTER: Primary | ICD-10-CM

## 2021-01-14 PROCEDURE — 99213 OFFICE O/P EST LOW 20 MIN: CPT | Performed by: FAMILY MEDICINE

## 2021-01-14 NOTE — PROGRESS NOTES
Assessment/Plan:  Assessment/Plan   Diagnoses and all orders for this visit:    Strain of calf muscle, left, subsequent encounter        47-year-old active female with left calf pain of more than 9 months duration  Discussed with patient physical exam, impression and plan  Physical noted for tenderness proximal and midsubstance aspect of medial gastrocnemius  Clinical impression is that she is still symptomatic from strain of the calf  I discussed with patient that she is likely still recovering from her injury and she will benefit from a period of resting  I recommend she use crutches to limit weight-bearing on left lower extremity for a period of 4-5 weeks  If still symptomatic afterward she may consult with orthopedic surgeon  Subjective:   Patient ID: Minor Sides is a 50 y o  female  Chief Complaint   Patient presents with    Left Lower Leg - Pain, Swelling, Follow-up       47-year-old female following up for left calf pain of more than 9 months duration  She was last seen by me 3 months ago at which point she was referred for ultrasound-guided aspiration of cyst in the left lower leg  She underwent attempt of aspiration under ultrasound on 10/02/2020 with small amount of aspirate removed  She was advised on formal physical therapy  She reports that afterward her symptoms have improved and was feeling well  She states that 2 days ago while walking up stairs she felt pain at the posterior aspect of left lower leg  Pain described as sudden onset, sharp, localized the left proximal calf, radiating distally, worse with standing, worse with direct pressure, and improved with rest     Leg Pain  This is a recurrent problem  The current episode started in the past 7 days  The problem occurs daily  The problem has been waxing and waning  Associated symptoms include arthralgias  Pertinent negatives include no joint swelling, numbness or weakness  The symptoms are aggravated by standing and walking   She has tried rest and NSAIDs for the symptoms  The treatment provided mild relief  Review of Systems   Musculoskeletal: Positive for arthralgias  Negative for joint swelling  Neurological: Negative for weakness and numbness  Objective:  Vitals:    01/14/21 1031   BP: 115/81   Pulse: 93   Weight: 72 6 kg (160 lb)   Height: 5' 4" (1 626 m)     Left Ankle Exam     Tenderness   The patient is experiencing no tenderness  Swelling: none    Range of Motion   The patient has normal left ankle ROM  Muscle Strength   Dorsiflexion:  5/5   Plantar flexion:  5/5       Left Knee Exam     Muscle Strength   The patient has normal left knee strength  Range of Motion   The patient has normal left knee ROM  Strength/Myotome Testing     Left Ankle/Foot   Dorsiflexion: 5  Plantar flexion: 5      Physical Exam  Vitals signs and nursing note reviewed  Constitutional:       General: She is not in acute distress  Appearance: She is well-developed  HENT:      Head: Normocephalic  Right Ear: External ear normal       Left Ear: External ear normal    Eyes:      Conjunctiva/sclera: Conjunctivae normal    Neck:      Trachea: No tracheal deviation  Cardiovascular:      Rate and Rhythm: Normal rate  Pulmonary:      Effort: Pulmonary effort is normal  No respiratory distress  Abdominal:      General: There is no distension  Musculoskeletal:         General: Tenderness (Left gastrocnemius) present  Skin:     General: Skin is warm and dry  Neurological:      Mental Status: She is alert and oriented to person, place, and time     Psychiatric:         Behavior: Behavior normal

## 2021-05-27 ENCOUNTER — OFFICE VISIT (OUTPATIENT)
Dept: OBGYN CLINIC | Facility: CLINIC | Age: 46
End: 2021-05-27

## 2021-05-27 VITALS
BODY MASS INDEX: 27.83 KG/M2 | SYSTOLIC BLOOD PRESSURE: 129 MMHG | WEIGHT: 163 LBS | HEART RATE: 87 BPM | HEIGHT: 64 IN | DIASTOLIC BLOOD PRESSURE: 87 MMHG

## 2021-05-27 DIAGNOSIS — G56.01 CARPAL TUNNEL SYNDROME ON RIGHT: Primary | ICD-10-CM

## 2021-05-27 DIAGNOSIS — G56.02 CARPAL TUNNEL SYNDROME ON LEFT: ICD-10-CM

## 2021-05-27 PROCEDURE — 99213 OFFICE O/P EST LOW 20 MIN: CPT | Performed by: FAMILY MEDICINE

## 2021-05-27 PROCEDURE — 20526 THER INJECTION CARP TUNNEL: CPT | Performed by: FAMILY MEDICINE

## 2021-05-27 RX ORDER — LIDOCAINE HYDROCHLORIDE 10 MG/ML
1 INJECTION, SOLUTION INFILTRATION; PERINEURAL
Status: COMPLETED | OUTPATIENT
Start: 2021-05-27 | End: 2021-05-27

## 2021-05-27 RX ORDER — TRIAMCINOLONE ACETONIDE 40 MG/ML
20 INJECTION, SUSPENSION INTRA-ARTICULAR; INTRAMUSCULAR
Status: COMPLETED | OUTPATIENT
Start: 2021-05-27 | End: 2021-05-27

## 2021-05-27 RX ORDER — GABAPENTIN 300 MG/1
300 CAPSULE ORAL
Qty: 30 CAPSULE | Refills: 1 | Status: SHIPPED | OUTPATIENT
Start: 2021-05-27 | End: 2021-11-20 | Stop reason: SDUPTHER

## 2021-05-27 RX ORDER — LIDOCAINE HYDROCHLORIDE 10 MG/ML
0.5 INJECTION, SOLUTION INFILTRATION; PERINEURAL
Status: COMPLETED | OUTPATIENT
Start: 2021-05-27 | End: 2021-05-27

## 2021-05-27 RX ADMIN — TRIAMCINOLONE ACETONIDE 20 MG: 40 INJECTION, SUSPENSION INTRA-ARTICULAR; INTRAMUSCULAR at 10:19

## 2021-05-27 RX ADMIN — LIDOCAINE HYDROCHLORIDE 0.5 ML: 10 INJECTION, SOLUTION INFILTRATION; PERINEURAL at 10:19

## 2021-05-27 RX ADMIN — LIDOCAINE HYDROCHLORIDE 1 ML: 10 INJECTION, SOLUTION INFILTRATION; PERINEURAL at 10:19

## 2021-05-27 NOTE — PROGRESS NOTES
Assessment/Plan:  Assessment/Plan   Diagnoses and all orders for this visit:    Carpal tunnel syndrome on right  -     gabapentin (NEURONTIN) 300 mg capsule; Take 1 capsule (300 mg total) by mouth daily at bedtime  -     Hand/upper extremity injection: R carpal tunnel    Carpal tunnel syndrome on left  -     gabapentin (NEURONTIN) 300 mg capsule; Take 1 capsule (300 mg total) by mouth daily at bedtime        50year-old right-dominant female with numbness and pain both hands more than 1 year duration  Discussed with patient physical exam, impression and plan  She has intact strength both hands  She has decreased sensation light touch dermatomes C6-C7 both upper extremities  There is no worsening symptoms Phalen's over the median nerve Tinel's  Clinical impression that she is symptomatic from carpal tunnel syndrome  She has been symptomatic despite cock-up splint on and symptoms have become more persistent offered patient corticoid injection to which she agreed  I administered mixture of 0 5 cc 1% lidocaine and 0 5 cc Kenalog to the right wrist carpal tunnel with ultrasound assistance without complication  She will return in 3 weeks at which point she will be re-evaluated and we will potentially inject left wrist   In the interim she is to start taking gabapentin 300 mg at night  She may continue with cock-up wrist splinting at nighttime  May also do home exercises via YouTube video instruction on carpal tunnel  Subjective:   Patient ID: Gisel Brothers is a 50 y o  female  Chief Complaint   Patient presents with    Left Wrist - Pain    Nausea       49-year-old right-hand-dominant female presents for evaluation of and numbness in both hands more than 1 year duration  She denies any particular trauma, but reports having started a new job but 1 year ago in which she did a lot more activity with her hands    She started having pain described as generalized to the hands, achy and throbbing, associated numbness, worse with activity, and improved resting  She started wearing cock-up wrist splints while she was physically active  She reports that over the past year numbness and tingling have progressed become more consistent in the distribution of the 1st 4 digits of both hands  She denies weakness  She reports that symptoms sometimes wake her from sleep  Hand Pain  This is a chronic problem  The current episode started more than 1 year ago  The problem occurs constantly  The problem has been gradually worsening  Associated symptoms include arthralgias and numbness  Pertinent negatives include no joint swelling or weakness  Exacerbated by: Physical activity  She has tried rest, immobilization and position changes for the symptoms  The treatment provided mild relief  Review of Systems   Musculoskeletal: Positive for arthralgias  Negative for joint swelling  Neurological: Positive for numbness  Negative for weakness  Objective:  Vitals:    05/27/21 0935   BP: 129/87   Pulse: 87   Weight: 73 9 kg (163 lb)   Height: 5' 4" (1 626 m)     Right Hand Exam     Muscle Strength   The patient has normal right wrist strength  Tests   Phalens Sign: negative  Tinel's sign (median nerve): negative  Finkelstein's test: negative    Other   Sensation: decreased  Pulse: present      Left Hand Exam     Muscle Strength   The patient has normal left wrist strength  Tests   Phalens Sign: negative  Tinel's sign (median nerve): negative  Finkelstein's test: negative    Other   Sensation: decreased  Pulse: present      Right Elbow Exam     Tests   Tinel's sign (cubital tunnel): negative      Left Elbow Exam     Tests   Tinel's sign (cubital tunnel): negative          Observations     Left Wrist/Hand   Negative for deformity  Right Wrist/Hand   Negative for deformity       Additional Observation Details  Right  -mild thenar eminence atrophy    Tenderness     Left Wrist/Hand   No tenderness in the first dorsal compartment, second dorsal compartment, fifth dorsal compartment, sixth dorsal compartment, carpometacarpal joint, scaphoid and lunate  Right Wrist/Hand   No tenderness in the first dorsal compartment, second dorsal compartment, fifth dorsal compartment, sixth dorsal compartment, carpometacarpal joint, scaphoid and lunate  Active Range of Motion     Left Wrist   Normal active range of motion    Right Wrist   Normal active range of motion    Additional Active Range of Motion Details  Normal range of motion of fingers of both hands    Strength/Myotome Testing     Left Wrist/Hand   Normal wrist strength     (2nd hand position)     Trial 1: 5    Right Wrist/Hand   Normal wrist strength     (2nd hand position)     Trial 1: 5    Additional Strength Details  Right  -mild thenar eminence atrophy    Tests     Left Wrist/Hand   Negative AIN OK sign, Finkelstein's, Froment's sign, Phalen's sign, Tinel's sign (medial nerve) and Tinel's sign (radial tunnel)  Right Wrist/Hand   Negative AIN OK sign, Finkelstein's, Froment's sign, Phalen's sign, Tinel's sign (medial nerve) and Tinel's sign (radial tunnel)  Physical Exam  Vitals signs and nursing note reviewed  Constitutional:       General: She is not in acute distress  Appearance: She is well-developed  HENT:      Head: Normocephalic  Right Ear: External ear normal       Left Ear: External ear normal    Eyes:      Conjunctiva/sclera: Conjunctivae normal    Neck:      Trachea: No tracheal deviation  Cardiovascular:      Rate and Rhythm: Normal rate  Pulmonary:      Effort: Pulmonary effort is normal  No respiratory distress  Abdominal:      General: There is no distension  Musculoskeletal:         General: No tenderness  Right hand: She exhibits no deformity  Left hand: She exhibits no deformity  Skin:     General: Skin is warm and dry     Neurological:      Mental Status: She is alert and oriented to person, place, and time  Psychiatric:         Behavior: Behavior normal            Hand/upper extremity injection: R carpal tunnel  [place] Protocol:  Consent: Verbal consent obtained  Risks and benefits: risks, benefits and alternatives were discussed  Consent given by: patient  Time out: Immediately prior to procedure a "time out" was called to verify the correct patient, procedure, equipment, support staff and site/side marked as required  Timeout called at: 5/27/2021 10:00 AM   Patient understanding: patient states understanding of the procedure being performed  Patient consent: the patient's understanding of the procedure matches consent given  Procedure consent: procedure consent matches procedure scheduled  Relevant documents: relevant documents present and verified  Test results: test results available and properly labeled  Site marked: the operative site was marked  Radiology Images displayed and confirmed   If images not available, report reviewed: imaging studies available  Required items: required blood products, implants, devices, and special equipment available  Patient identity confirmed: verbally with patient    Supporting Documentation  Indications: pain   Procedure Details  Condition:carpal tunnel syndrome Site: R carpal tunnel   Preparation: Patient was prepped and draped in the usual sterile fashion  Needle size: 27 G  Ultrasound guidance: no  Approach: ulnar  Medications administered: 0 5 mL lidocaine 1 %; 1 mL lidocaine 1 %; 20 mg triamcinolone acetonide 40 mg/mL    Patient tolerance: patient tolerated the procedure well with no immediate complications  Dressing:  Sterile dressing applied

## 2021-06-01 ENCOUNTER — TELEPHONE (OUTPATIENT)
Dept: OBGYN CLINIC | Facility: CLINIC | Age: 46
End: 2021-06-01

## 2021-06-17 ENCOUNTER — OFFICE VISIT (OUTPATIENT)
Dept: OBGYN CLINIC | Facility: CLINIC | Age: 46
End: 2021-06-17

## 2021-06-17 VITALS
HEIGHT: 64 IN | SYSTOLIC BLOOD PRESSURE: 124 MMHG | HEART RATE: 90 BPM | WEIGHT: 161 LBS | BODY MASS INDEX: 27.49 KG/M2 | DIASTOLIC BLOOD PRESSURE: 81 MMHG

## 2021-06-17 DIAGNOSIS — G56.02 CARPAL TUNNEL SYNDROME ON LEFT: Primary | ICD-10-CM

## 2021-06-17 DIAGNOSIS — G56.01 CARPAL TUNNEL SYNDROME ON RIGHT: ICD-10-CM

## 2021-06-17 PROCEDURE — 99213 OFFICE O/P EST LOW 20 MIN: CPT | Performed by: FAMILY MEDICINE

## 2021-06-17 PROCEDURE — 20526 THER INJECTION CARP TUNNEL: CPT | Performed by: FAMILY MEDICINE

## 2021-06-17 RX ORDER — LIDOCAINE HYDROCHLORIDE 10 MG/ML
1 INJECTION, SOLUTION INFILTRATION; PERINEURAL
Status: COMPLETED | OUTPATIENT
Start: 2021-06-17 | End: 2021-06-17

## 2021-06-17 RX ORDER — TRIAMCINOLONE ACETONIDE 40 MG/ML
20 INJECTION, SUSPENSION INTRA-ARTICULAR; INTRAMUSCULAR
Status: COMPLETED | OUTPATIENT
Start: 2021-06-17 | End: 2021-06-17

## 2021-06-17 RX ADMIN — TRIAMCINOLONE ACETONIDE 20 MG: 40 INJECTION, SUSPENSION INTRA-ARTICULAR; INTRAMUSCULAR at 16:09

## 2021-06-17 RX ADMIN — LIDOCAINE HYDROCHLORIDE 1 ML: 10 INJECTION, SOLUTION INFILTRATION; PERINEURAL at 16:09

## 2021-06-17 NOTE — PROGRESS NOTES
Assessment/Plan:  Assessment/Plan   Diagnoses and all orders for this visit:    Carpal tunnel syndrome on left  -     Hand/upper extremity injection: L carpal tunnel    Carpal tunnel syndrome on right        57-year-old right-dominant female with numbness and pain in both hands more than 1 year duration  Discussed with patient physical exam, impression and plan  Right hand noted for intact sensation and strength  Impression is that she has significant improved regard to carpal tunnel symptoms on the right upper extremity  I offered patient corticosteroid injection to the left carpal tunnel to which she agreed  I administered mixture of 0 5 cc 1% lidocaine, and 0 5 cc Kenalog to left carpal tunnel without complication  I provided her with instructions to wean from gabapentin  She is to continue with home exercise program and splinting at night  She will follow up as needed  Subjective:   Patient ID: Eduard Barfield is a 50 y o  female  Chief Complaint   Patient presents with    Right Wrist - Follow-up    Left Wrist - Follow-up       57-year-old right-hand-dominant female following up for numbness and pain in both hands more than 1 year duration  She was last seen by me 3 weeks ago which point clinical impression was carpal tunnel  She was given right carpal tunnel corticoid injection and prescribed gabapentin 300 mg to be taken at nighttime  She was advised on continue with cock-up wrist splinting and do home exercises for carpal tunnel  She reports significant improvement in the right upper extremity  About 2 days after injection numbness and tingling in the right hand abated  Since starting gabapentin numbness and tingling intensity in the left upper extremity decreased however she does feel drowsiness  She has been having pain described as generalized to the left hand, achy and throbbing, associated with numbness, worse with activity, and improved with resting      Hand Pain  This is a chronic problem  The current episode started more than 1 year ago  The problem occurs daily  The problem has been gradually improving  Associated symptoms include numbness  Pertinent negatives include no arthralgias, joint swelling or weakness  Exacerbated by: Hand use  She has tried rest and immobilization (Gabapentin, splinting) for the symptoms  The treatment provided mild relief  Review of Systems   Musculoskeletal: Negative for arthralgias and joint swelling  Neurological: Positive for numbness  Negative for weakness  Objective:  Vitals:    06/17/21 1431   BP: 124/81   Pulse: 90   Weight: 73 kg (161 lb)   Height: 5' 4" (1 626 m)     Right Hand Exam     Tenderness   The patient is experiencing no tenderness  Muscle Strength   The patient has normal right wrist strength  Other   Sensation: normal      Left Hand Exam     Muscle Strength   The patient has normal left wrist strength  Other   Sensation: normal          Strength/Myotome Testing     Left Wrist/Hand   Normal wrist strength    Right Wrist/Hand   Normal wrist strength      Physical Exam  Vitals and nursing note reviewed  Constitutional:       General: She is not in acute distress  Appearance: She is well-developed  HENT:      Head: Normocephalic  Right Ear: External ear normal       Left Ear: External ear normal    Eyes:      Conjunctiva/sclera: Conjunctivae normal    Neck:      Trachea: No tracheal deviation  Cardiovascular:      Rate and Rhythm: Normal rate  Pulmonary:      Effort: Pulmonary effort is normal  No respiratory distress  Abdominal:      General: There is no distension  Skin:     General: Skin is warm and dry  Neurological:      Mental Status: She is alert and oriented to person, place, and time  Psychiatric:         Behavior: Behavior normal            Hand/upper extremity injection: L carpal tunnel  [place] Protocol:  Consent: Verbal consent obtained    Risks and benefits: risks, benefits and alternatives were discussed  Consent given by: patient  Time out: Immediately prior to procedure a "time out" was called to verify the correct patient, procedure, equipment, support staff and site/side marked as required  Timeout called at: 6/17/2021 2:40 PM   Patient understanding: patient states understanding of the procedure being performed  Patient consent: the patient's understanding of the procedure matches consent given  Procedure consent: procedure consent matches procedure scheduled  Relevant documents: relevant documents present and verified  Test results: test results available and properly labeled  Site marked: the operative site was marked  Radiology Images displayed and confirmed   If images not available, report reviewed: imaging studies available  Required items: required blood products, implants, devices, and special equipment available  Patient identity confirmed: verbally with patient    Supporting Documentation  Indications: diagnostic   Procedure Details  Condition:carpal tunnel syndrome Site: L carpal tunnel   Preparation: Patient was prepped and draped in the usual sterile fashion  Needle size: 27 G  Ultrasound guidance: no  Approach: radial  Medications administered: 1 mL lidocaine 1 %; 20 mg triamcinolone acetonide 40 mg/mL    Patient tolerance: patient tolerated the procedure well with no immediate complications  Dressing:  Sterile dressing applied

## 2021-08-16 ENCOUNTER — HOSPITAL ENCOUNTER (EMERGENCY)
Facility: HOSPITAL | Age: 46
Discharge: HOME | End: 2021-08-16
Attending: EMERGENCY MEDICINE | Admitting: EMERGENCY MEDICINE
Payer: OTHER MISCELLANEOUS

## 2021-08-16 VITALS
OXYGEN SATURATION: 95 % | RESPIRATION RATE: 18 BRPM | DIASTOLIC BLOOD PRESSURE: 82 MMHG | SYSTOLIC BLOOD PRESSURE: 150 MMHG | HEART RATE: 97 BPM

## 2021-08-16 DIAGNOSIS — T78.3XXA ALLERGIC ANGIOEDEMA, INITIAL ENCOUNTER: Primary | ICD-10-CM

## 2021-08-16 PROCEDURE — 99284 EMERGENCY DEPT VISIT MOD MDM: CPT

## 2021-08-16 PROCEDURE — 99281 EMR DPT VST MAYX REQ PHY/QHP: CPT | Performed by: EMERGENCY MEDICINE

## 2021-08-16 RX ORDER — EPINEPHRINE 0.3 MG/.3ML
0.3 INJECTION SUBCUTANEOUS ONCE
Qty: 0.6 ML | Refills: 0 | Status: SHIPPED | OUTPATIENT
Start: 2021-08-16 | End: 2022-04-14

## 2021-08-16 RX ORDER — EPINEPHRINE 1 MG/ML
1 INJECTION, SOLUTION, CONCENTRATE INTRAVENOUS ONCE
Status: COMPLETED | OUTPATIENT
Start: 2021-08-16 | End: 2021-08-16

## 2021-08-16 RX ORDER — EPINEPHRINE 0.1 MG/ML
1 SYRINGE (ML) INJECTION ONCE
Status: DISCONTINUED | OUTPATIENT
Start: 2021-08-16 | End: 2021-08-16 | Stop reason: HOSPADM

## 2021-08-16 RX ORDER — PREDNISONE 20 MG/1
50 TABLET ORAL DAILY
Qty: 13 TABLET | Refills: 0 | Status: SHIPPED | OUTPATIENT
Start: 2021-08-16 | End: 2021-08-21

## 2021-08-16 RX ORDER — EPINEPHRINE 0.3 MG/.3ML
0.3 INJECTION SUBCUTANEOUS ONCE
Qty: 0.6 ML | Refills: 0 | Status: SHIPPED | OUTPATIENT
Start: 2021-08-16 | End: 2022-03-28 | Stop reason: ALTCHOICE

## 2021-08-16 NOTE — ED PROVIDER NOTES
History  Chief Complaint   Patient presents with    Bee Sting     Pt got stung by a bee three times; twice in the face and once in the thigh  Pt has swollen top lip and left side of face  Given 0 3 of epi, 50mg IV benadryl, and 125mg of solumedrol by EMS and urgent care      Patient is a 69-year-old female past medical history of anxiety depression presenting with lipid facial swelling following bee sting  Patient states 1 hour ago she was using a leaf blower in the [place] when she ran over a patch of bees and states that she was stung to her lip, cheek, thigh  She states that she immediately began having swelling and burning pain to the lip which worsened until EMS arrived and gave her EpiPen, Benadryl, Solu-Medrol  She states that she feels that the swelling is not better or worsened since that time and still notes continued burning pain to her lip but denies any swelling to her throat, changes to her voice, shortness of breath, nausea or vomiting  Has had allergic reaction hornets in the past but never to bees  Denies any other known allergens and does not take any medications  Prior to Admission Medications   Prescriptions Last Dose Informant Patient Reported? Taking?    LORazepam (ATIVAN) 1 mg tablet   No No   Sig: Take 1 tablet (1 mg total) by mouth daily as needed for anxiety (moderate anxiety) for up to 7 days   gabapentin (NEURONTIN) 300 mg capsule  Self No No   Sig: Take 1 capsule (300 mg total) by mouth daily at bedtime   ibuprofen (MOTRIN) 800 mg tablet   No No   Sig: Take 1 tablet (800 mg total) by mouth 2 (two) times a day for 10 days   naproxen (NAPROSYN) 500 mg tablet  Self No No   Sig: Take 1 tablet (500 mg total) by mouth 2 (two) times a day with meals   Patient not taking: Reported on 5/27/2021   nicotine (NICODERM CQ) 21 mg/24 hr TD 24 hr patch  Self No No   Sig: Place 1 patch on the skin daily   traZODone (DESYREL) 150 mg tablet  Self Yes No   Sig: Take 100 mg by mouth daily at bedtime Patient not taking: Reported on 6/17/2021   venlafaxine (EFFEXOR-XR) 75 mg 24 hr capsule   No No   Sig: Take 1 capsule (75 mg total) by mouth daily for 30 days      Facility-Administered Medications: None       Past Medical History:   Diagnosis Date    Anxiety     Depression     Suicide attempt ([facility] [address] )        History reviewed  No pertinent surgical history  Family History   Problem Relation Age of Onset    Depression Mother     Bipolar disorder Father     Schizoaffective Disorder  Sister      I have reviewed and agree with the history as documented  E-Cigarette/Vaping     E-Cigarette/Vaping Substances     Social History     Tobacco Use    Smoking status: Current Every Day Smoker     Packs/day: 0 50     Types: Cigarettes    Smokeless tobacco: Never Used   Substance Use Topics    Alcohol use: No    Drug use: No       Review of Systems   All other systems reviewed and are negative  Physical Exam  Physical Exam  Vitals reviewed  Constitutional:       General: She is not in acute distress  Appearance: Normal appearance  She is not ill-appearing  Comments: Speaking in full sentences, phonating normally   HENT:      Mouth/Throat:      Mouth: Mucous membranes are moist       Comments: Angioedema to the top lip however no edema to the bottom lip, no edema in the oropharynx  Eyes:      Conjunctiva/sclera: Conjunctivae normal    Cardiovascular:      Rate and Rhythm: Normal rate and regular rhythm  Heart sounds: Normal heart sounds  Pulmonary:      Effort: Pulmonary effort is normal  No respiratory distress  Breath sounds: Normal breath sounds  No stridor  Abdominal:      General: Abdomen is flat  Palpations: Abdomen is soft  Tenderness: There is no abdominal tenderness  Musculoskeletal:         General: No swelling  Normal range of motion  Cervical back: Neck supple  Skin:     General: Skin is warm and dry     Neurological:      General: No focal deficit present  Mental Status: She is alert  Psychiatric:         Mood and Affect: Mood normal          Vital Signs  ED Triage Vitals [08/16/21 1115]   Temp Pulse Respirations Blood Pressure SpO2   -- 97 18 150/82 95 %      Temp src Heart Rate Source Patient Position - Orthostatic VS BP Location FiO2 (%)   -- Monitor -- Right arm --      Pain Score       --           Vitals:    08/16/21 1115   BP: 150/82   Pulse: 97         Visual Acuity      ED Medications  Medications   diphenhydrAMINE (FOR EMS ONLY) (BENADRYL) injection 50 mg (0 mg Does not apply Given to EMS 8/16/21 1124)   EPINEPHrine PF (FOR EMS ONLY) (ADRENALIN) 1 mg/mL injection 1 mg (0 mg Does not apply Given to EMS 8/16/21 1125)       Diagnostic Studies  Results Reviewed     None                 No orders to display              Procedures  Procedures         ED Course  ED Course as of Aug 16 1559   Mon Aug 16, 2021   1329 Patient requesting discharge, have discussed with her longer observation  Relative to her angioedema which does not appear markedly improved however does not appear worsened however  patient states that she is feeling better would like to be discharged  Have discussed with family need for continued monitoring at home and return for any worsening symptoms and will discharge with EpiPen prescription  MDM  Number of Diagnoses or Management Options  Diagnosis management comments: Patient is a 67-year-old female past medical history of anxiety depression presenting with allergic angioedema  Patient is well-appearing bedside stable vitals and in no acute distress  She is speaking in full sentences with no signs respiratory distress, phonating normally, with no stridor, no edema within the oropharynx however angioedema to the top lip, none to the bottom lip  She has no other significant physical exam findings    Patient has already received appropriate treatment for anaphylaxis, will continue to monitor and if no change in 4 hours will discharge with EpiPen prescription, outpatient steroids, continued Benadryl  Disposition  Final diagnoses: Allergic angioedema, initial encounter     Time reflects when diagnosis was documented in both MDM as applicable and the Disposition within this note     Time User Action Codes Description Comment    8/16/2021  1:30 PM Raudel Gallardo Add Sammye Boas  3XXA] Allergic angioedema, initial encounter       ED Disposition     ED Disposition Condition Date/Time Comment    Discharge Stable Mon Aug 16, 2021  1:30 PM Jose Burnham discharge to home/self care  Follow-up Information     Follow up With Specialties Details Why Contact Info Additional Information    Cristiane Cheney MD  In 1 week  [address] [facility] [address] [place] [address] Emergency Department Emergency Medicine  If symptoms worsen [address] Emergency Department, [address] [place] [address], [id number]          Discharge Medication List as of 8/16/2021  1:31 PM      START taking these medications    Details   !! EPINEPHrine (EPIPEN) 0 3 mg/0 3 mL SOAJ Inject 0 3 mL (0 3 mg total) into a muscle once for 1 dose, Starting Mon 8/16/2021, Normal      !! EPINEPHrine (EPIPEN) 0 3 mg/0 3 mL SOAJ Inject 0 3 mL (0 3 mg total) into a muscle once for 1 dose, Starting Mon 8/16/2021, Normal      predniSONE 20 mg tablet Take 2 5 tablets (50 mg total) by mouth daily for 5 days, Starting Mon 8/16/2021, Until Sat 8/21/2021, Normal       !! - Potential duplicate medications found  Please discuss with provider        CONTINUE these medications which have NOT CHANGED    Details   gabapentin (NEURONTIN) 300 mg capsule Take 1 capsule (300 mg total) by mouth daily at bedtime, Starting Thu 5/27/2021, Normal      ibuprofen (MOTRIN) 800 mg tablet Take 1 tablet (800 mg total) by mouth 2 (two) times a day for 10 days, Starting Sun 3/15/2020, Until Wed 3/25/2020, Print      LORazepam (ATIVAN) 1 mg tablet Take 1 tablet (1 mg total) by mouth daily as needed for anxiety (moderate anxiety) for up to 7 days, Starting Wed 10/10/2018, Until Wed 10/17/2018, Print      naproxen (NAPROSYN) 500 mg tablet Take 1 tablet (500 mg total) by mouth 2 (two) times a day with meals, Starting Sat 7/18/2020, Normal      nicotine (NICODERM CQ) 21 mg/24 hr TD 24 hr patch Place 1 patch on the skin daily, Starting Thu 10/11/2018, Print      traZODone (DESYREL) 150 mg tablet Take 100 mg by mouth daily at bedtime  , Historical Med      venlafaxine (EFFEXOR-XR) 75 mg 24 hr capsule Take 1 capsule (75 mg total) by mouth daily for 30 days, Starting Thu 10/11/2018, Until Sat 7/18/2020, Print           No discharge procedures on file      PDMP Review     None          ED Provider  Electronically Signed by           Doyce Kussmaul, DO  08/16/21 8254

## 2021-08-16 NOTE — ED NOTES
Pt discharged by provider  Ambulated self well, steady gait out of department and accompanied by family       Cris Irby RN  08/16/21 5996

## 2021-11-20 ENCOUNTER — OFFICE VISIT (OUTPATIENT)
Dept: OBGYN CLINIC | Facility: CLINIC | Age: 46
End: 2021-11-20
Payer: COMMERCIAL

## 2021-11-20 VITALS
HEIGHT: 64 IN | SYSTOLIC BLOOD PRESSURE: 121 MMHG | DIASTOLIC BLOOD PRESSURE: 84 MMHG | WEIGHT: 161 LBS | BODY MASS INDEX: 27.49 KG/M2 | HEART RATE: 99 BPM

## 2021-11-20 DIAGNOSIS — R20.2 NUMBNESS AND TINGLING IN BOTH HANDS: ICD-10-CM

## 2021-11-20 DIAGNOSIS — G56.03 CARPAL TUNNEL SYNDROME, BILATERAL: Primary | ICD-10-CM

## 2021-11-20 DIAGNOSIS — R20.0 NUMBNESS AND TINGLING IN BOTH HANDS: ICD-10-CM

## 2021-11-20 PROCEDURE — 99213 OFFICE O/P EST LOW 20 MIN: CPT | Performed by: FAMILY MEDICINE

## 2021-11-20 RX ORDER — GABAPENTIN 300 MG/1
300 CAPSULE ORAL
Qty: 30 CAPSULE | Refills: 1 | Status: SHIPPED | OUTPATIENT
Start: 2021-11-20 | End: 2022-03-28 | Stop reason: ALTCHOICE

## 2022-01-17 ENCOUNTER — PROCEDURE VISIT (OUTPATIENT)
Dept: NEUROLOGY | Facility: CLINIC | Age: 47
End: 2022-01-17
Payer: COMMERCIAL

## 2022-01-17 DIAGNOSIS — R20.0 NUMBNESS AND TINGLING IN BOTH HANDS: ICD-10-CM

## 2022-01-17 DIAGNOSIS — R20.2 NUMBNESS AND TINGLING IN BOTH HANDS: ICD-10-CM

## 2022-01-17 PROCEDURE — 95886 MUSC TEST DONE W/N TEST COMP: CPT | Performed by: PHYSICAL MEDICINE & REHABILITATION

## 2022-01-17 PROCEDURE — 95911 NRV CNDJ TEST 9-10 STUDIES: CPT | Performed by: PHYSICAL MEDICINE & REHABILITATION

## 2022-01-17 NOTE — PROGRESS NOTES
EMG 2 Limb Upper Extremity     Date/Time 1/17/2022 1:37 PM     Performed by  Timo Galeas MD     Authorized by Getachew Aguilar DO                [facility] AT [address]  [phone]    Electromyography & Nerve Conduction Studies Report          Full Name: Emerson Kerns Gender: Female  MRN: [id number] YOB: 1975      Visit Date: 1/17/2022 12:58 PM  Age: 48 Years  Examining Physician: Dr Rand Goodrich  Referring Physician: Dr Phoebe Elkins History: 48 year right handed female presents with tinling ,numbness and burning pain in both wrists radiating to the elbow, right more than the left  Denies any neck pain  Sensory Nerve Conduction Study       Nerve / Sites Rec  Site Onset Lat Peak Lat  Amp Segments Distance Velocity Temp      ms ms µV  cm m/s °C   R Median - Dig II (Antidromic)  Wrist Index 3 3 4 8 28 9 Wrist - Index 14 42 31 9      Ref  ?3 4  ? 20 0 Ref  ?50    L Median - Dig II (Antidromic)  Wrist Index 3 3 4 7 45 6 Wrist - Index 14 42 23 8      Ref  ?3 4  ? 20 0 Ref  ?50    L Ulnar - Dig V (Antidromic)      Wrist Dig V 2 1 3 0 47 0 Wrist - Dig V 11 53 23 3      Ref  ?3 1 ? 17 0 Ref  ?50    L Ulnar - Dig V (Antidromic)  Wrist Dig V 2 0 3 2 20 0 Wrist - Dig V 12 61 31 9      Ref  ?2 9  ? 17 0 Ref  ?50    R Ulnar - Dig V (Antidromic)  Wrist Dig V 2 2 3 3 29 2 Wrist - Dig V 12 54 31 9      Ref  ?2 9  ? 17 0 Ref  ?50    R Radial - Superficial (Antidromic)      Forearm Wrist 1 7 2 3 23 5 Forearm - Wrist 10 60 31 7      Ref  ?2 9 ? 15 0 Ref  ?50    L Radial - Superficial (Antidromic)      Forearm Wrist 1 7 2 3 26 7 Forearm - Wrist 10 58 25      Ref  ?2 9 ? 15 0 Ref  ?50        Motor Nerve Conduction Study       Nerve / Sites Muscle Latency Ref  Amplitude Ref  Segments Distance Lat Diff Velocity Ref   Temp      ms ms mV mV  cm ms m/s m/s °C   R Median - APB      Wrist APB 4 4 ?4 4 9 5 ?4 0 Wrist - APB 7    31 5      Elbow APB 7 4  9 6  Elbow - Wrist 20 2 98 67 ?49 31 5   L Median - APB      Wrist APB 3 9 ?4 4 8 1 ?4 0 Wrist - APB 7    31 7      Elbow APB 8 7  8 6  Elbow - Wrist 20 4 73 42 ?49 31 7   R Ulnar - ADM      Wrist ADM 2 3 ?3 3 12 9 ?6 0 Wrist - ADM 7    31 9      B  Elbow ADM 5 5  11 3  B  Elbow - Wrist 19 3 23 59 ?49 31 7      A  Elbow ADM 6 8  12 7  A  Elbow - B  Elbow 10 1 25 80 ?49 31 7   L Ulnar - ADM      Wrist ADM 2 6 ?3 3 10 0 ?6 0 Wrist - ADM 7    31 6      B  Elbow ADM 5 7  9 2  B  Elbow - Wrist 19 3 10 61 ?49 31 5      A  Elbow ADM 7 5  9 3  A  Elbow - B  Elbow 10 1 75 57 ?49 31 5       F Waves       Nerve F Latency Ref  ms ms   R Median - APB 24 9 ?31 0   R Ulnar - ADM 25 4 ?32 0   L Median - APB 29 2 ?31 0   L Ulnar - ADM 26 0 ?32 0       EMG Summary Table     Spontaneous MUAP Recruitment   Muscle Nerve Roots IA Fib PSW Fasc H F  Dur  Amp PPP Config Pattern   L  First dorsal interosseous Ulnar C8-T1 NL None None None None NL NL None NL NL   R  First dorsal interosseous Ulnar C8-T1 NL None None None None NL NL None NL NL   L  Deltoid Axillary C5-C6 NL None None None None NL NL None NL NL   L  Biceps brachii Musculocut  C5-C6 NL None None None None NL NL None NL NL   L  Triceps brachii Radial C6-C8 NL None None None None NL NL None NL NL   L  Pronator teres Median C6-C7 NL None None None None NL NL None NL NL   L  Abductor pollicis brevis Median U5-M0 NL None None None None NL NL None NL NL   L  Cervical paraspinals (low)  - NL None None None None NL NL None NL NL   R  Deltoid Axillary C5-C6 NL None None None None NL NL None NL NL   R  Biceps brachii Musculocut  C5-C6 NL None None None None NL NL None NL NL   R  Triceps brachii Radial C6-C8 NL None None None None Gr  Incr  NL None NL Reduced   R  Pronator teres Median C6-C7 NL None None None None Sl  Incr  NL None NL Reduced   R  Abductor pollicis brevis Median S9-V7 NL None None None None NL NL None NL NL   R   Cervical paraspinals (low) - NL None None None None NL NL None NL NL                                           Summary          The right median and bilateral ulnar motor conduction velocities and compound muscle action potentials were normal with normal distal latencies across the wrists  The  left median motor terminal latency was normal with a normal compound motor action potential amplitude and a slow conduction velocity across the wrist     The left and right median sensory peak latency was normal with normal sensory action potential amplitude and a slow conduction velocity across the wrist   The bilateral ulnar sensory action potential was normal   The bilateral superficial radial sensory action potential was normal     The left and right median and ulnar F wave latencies were within normal limits  Concentric needle EMG was performed on various proximal and distal muscles of the bilateral upper extremities including deltoid, biceps, triceps, pronator teres, abductor pollicis brevis, FDI and low cervical paraspinals  There was no evidence of active denervation any of the muscles tested  Mild decreased recruitment of giant motor units was noted in the right triceps and pronator teres  Early recruited motor units appear normal with recruitment patterns being full or full for effort in the remaining muscles tested  INTERPRETATION:  There is electrophysiologic evidence of a:    1  Mild to moderate median nerve compression neuropathy at the wrist on the left with demyelinative changes, consistent with a diagnosis of carpal tunnel syndrome  2  Mild chronic C6-7 radiculopathy on the right as evidenced by the decreased recruitment and chronic denervation changes in the triceps and pronator teres  Clinical and imaging correlation of the cervical spine is suggested                  Impression:

## 2022-01-18 ENCOUNTER — OFFICE VISIT (OUTPATIENT)
Dept: OBGYN CLINIC | Facility: CLINIC | Age: 47
End: 2022-01-18
Payer: COMMERCIAL

## 2022-01-18 VITALS
HEART RATE: 114 BPM | SYSTOLIC BLOOD PRESSURE: 124 MMHG | HEIGHT: 65 IN | BODY MASS INDEX: 27.63 KG/M2 | DIASTOLIC BLOOD PRESSURE: 89 MMHG | WEIGHT: 165.8 LBS

## 2022-01-18 DIAGNOSIS — G56.03 CARPAL TUNNEL SYNDROME, BILATERAL: Primary | ICD-10-CM

## 2022-01-18 PROCEDURE — 20526 THER INJECTION CARP TUNNEL: CPT | Performed by: ORTHOPAEDIC SURGERY

## 2022-01-18 PROCEDURE — 99214 OFFICE O/P EST MOD 30 MIN: CPT | Performed by: ORTHOPAEDIC SURGERY

## 2022-01-18 RX ORDER — LIDOCAINE HYDROCHLORIDE 10 MG/ML
0.5 INJECTION, SOLUTION INFILTRATION; PERINEURAL
Status: COMPLETED | OUTPATIENT
Start: 2022-01-18 | End: 2022-01-18

## 2022-01-18 RX ORDER — TRIAMCINOLONE ACETONIDE 40 MG/ML
20 INJECTION, SUSPENSION INTRA-ARTICULAR; INTRAMUSCULAR
Status: COMPLETED | OUTPATIENT
Start: 2022-01-18 | End: 2022-01-18

## 2022-01-18 RX ADMIN — TRIAMCINOLONE ACETONIDE 20 MG: 40 INJECTION, SUSPENSION INTRA-ARTICULAR; INTRAMUSCULAR at 08:30

## 2022-01-18 RX ADMIN — LIDOCAINE HYDROCHLORIDE 0.5 ML: 10 INJECTION, SOLUTION INFILTRATION; PERINEURAL at 08:30

## 2022-01-18 NOTE — PROGRESS NOTES
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, she would like to proceed with repeat injections today  These were provided into bilateral carpal tunnels and patient tolerated the procedures well  In addition, patient should continue with her nocturnal splints  Follow up in 3 months for reevaluation  Subjective:   Ban Villavicencio is a 48 y o  female who presents to the office today at the request of Dr Fidelia Perdomo for evaluation and treatment of bilateral hand paresthesias  Patient states that the right side has been bothering her for 1 year and the left approximately 8-9 months  She states that she has numbness/burning in thumb through ring fingers bilaterally  She describes the pain being worse on the right but the left is starting to feel weaker  She has + flick sign and symptoms while driving  She describes nocturnal symptoms  States she wears braces every night  While these helped initially, they are not as effective now  Patient has received right carpal tunnel injx in May and left carpal tunnel injx in June  She states both of these helped and provided approximately 4 months relief  Patient states the injections improved her symptoms fully  She denies neck pain  Review of Systems   Musculoskeletal:        As noted in HPI  All other systems reviewed and are negative  Past Medical History:   Diagnosis Date    Anxiety     Depression     Suicide attempt [facility])        History reviewed  No pertinent surgical history      Family History   Problem Relation Age of Onset    Depression Mother    Sandra Real Bipolar disorder Father     Schizoaffective Disorder  Sister        Social History     Occupational History    Not on file   Tobacco Use    Smoking status: Current Every Day Smoker     Packs/day: 0 50     Types: Cigarettes    Smokeless tobacco: Never Used   Substance and Sexual Activity    Alcohol use: No    Drug use: No    Sexual activity: Yes     Partners: Male     Birth control/protection: Spermicide         Current Outpatient Medications:     EPINEPHrine (EPIPEN) 0 3 mg/0 3 mL SOAJ, Inject 0 3 mL (0 3 mg total) into a muscle once for 1 dose, Disp: 0 6 mL, Rfl: 0    EPINEPHrine (EPIPEN) 0 3 mg/0 3 mL SOAJ, Inject 0 3 mL (0 3 mg total) into a muscle once for 1 dose, Disp: 0 6 mL, Rfl: 0    gabapentin (NEURONTIN) 300 mg capsule, Take 1 capsule (300 mg total) by mouth daily at bedtime (Patient not taking: Reported on 1/18/2022 ), Disp: 30 capsule, Rfl: 1    ibuprofen (MOTRIN) 800 mg tablet, Take 1 tablet (800 mg total) by mouth 2 (two) times a day for 10 days, Disp: 20 tablet, Rfl: 0    LORazepam (ATIVAN) 1 mg tablet, Take 1 tablet (1 mg total) by mouth daily as needed for anxiety (moderate anxiety) for up to 7 days, Disp: 7 tablet, Rfl: 0    naproxen (NAPROSYN) 500 mg tablet, Take 1 tablet (500 mg total) by mouth 2 (two) times a day with meals (Patient not taking: Reported on 5/27/2021), Disp: 30 tablet, Rfl: 0    nicotine (NICODERM CQ) 21 mg/24 hr TD 24 hr patch, Place 1 patch on the skin daily (Patient not taking: Reported on 11/20/2021 ), Disp: 28 patch, Rfl: 0    traZODone (DESYREL) 150 mg tablet, Take 100 mg by mouth daily at bedtime   (Patient not taking: Reported on 6/17/2021), Disp: , Rfl:     venlafaxine (EFFEXOR-XR) 75 mg 24 hr capsule, Take 1 capsule (75 mg total) by mouth daily for 30 days, Disp: 30 capsule, Rfl: 0    Allergies   Allergen Reactions    Oxycodone-Acetaminophen Hives and Itching     vomiting and dizziness    Pentosan Polysulfate Hives and Itching    Pollen Extract     Sulfa Antibiotics Hives, Vomiting, Itching and Rash       Objective:  Vitals:    01/18/22 0800   BP: 124/89   Pulse: (!) 114       Ortho Exam   Left Upper Extremity:  + Tinel's carpal tunnel   + Durkan's compression test   + Tinel's cubital tunnel  No atrophy  4+/5 opposition    Right Upper Extremity:  - Tinel's carpal tunnel  + Durkan's compression test  + Tinel's cubital tunnel  No atrophy  5/5 opposition    Cervical Spine:  Well-maintained cervical flexion/extension without radicular symptoms  Negative Spurling's bilaterally    Physical Exam  Vitals reviewed  Constitutional:       Appearance: Normal appearance  She is well-developed  HENT:      Head: Normocephalic and atraumatic  Eyes:      Extraocular Movements: Extraocular movements intact  Conjunctiva/sclera: Conjunctivae normal    Neck:      Trachea: No tracheal deviation  Cardiovascular:      Pulses: Normal pulses  Pulmonary:      Effort: Pulmonary effort is normal    Abdominal:      Tenderness: There is no guarding  Skin:     General: Skin is warm and dry  Neurological:      Mental Status: She is alert and oriented to person, place, and time  Psychiatric:         Behavior: Behavior normal          Thought Content: Thought content normal          Judgment: Judgment normal          Studies Reviewed:  I have personally reviewed the patient's EMG from 01/17/22 and my interpretation is as follows:  EMG shows signs of mild carpal tunnel  In addition there is some evidence of mild C6-7 radiculopathy on the right with decreased recruitment/denervation changes in the triceps and pronator teres  Procedures Performed:  Hand/upper extremity injection: R carpal tunnel  [place] Protocol:  Consent: Verbal consent obtained    Risks and benefits: risks, benefits and alternatives were discussed  Consent given by: patient  Time out: Immediately prior to procedure a "time out" was called to verify the correct patient, procedure, equipment, support staff and site/side marked as required  Patient understanding: patient states understanding of the procedure being performed  Patient identity confirmed: verbally with patient    Supporting Documentation  Indications: therapeutic   Procedure Details  Condition:carpal tunnel syndrome Site: R carpal tunnel   Needle size: 27 G  Ultrasound guidance: no  Approach: volar  Medications administered: 0 5 mL lidocaine 1 %; 20 mg triamcinolone acetonide 40 mg/mL    Patient tolerance: patient tolerated the procedure well with no immediate complications  Dressing:  Sterile dressing applied     Hand/upper extremity injection: L carpal tunnel  [place] Protocol:  Consent: Verbal consent obtained  Risks and benefits: risks, benefits and alternatives were discussed  Consent given by: patient  Time out: Immediately prior to procedure a "time out" was called to verify the correct patient, procedure, equipment, support staff and site/side marked as required    Patient understanding: patient states understanding of the procedure being performed  Patient identity confirmed: verbally with patient    Supporting Documentation  Indications: therapeutic   Procedure Details  Condition:carpal tunnel syndrome Site: L carpal tunnel   Needle size: 27 G  Ultrasound guidance: no  Approach: volar  Medications administered: 0 5 mL lidocaine 1 %; 20 mg triamcinolone acetonide 40 mg/mL    Patient tolerance: patient tolerated the procedure well with no immediate complications  Dressing:  Sterile dressing applied

## 2022-03-22 ENCOUNTER — OFFICE VISIT (OUTPATIENT)
Dept: OBGYN CLINIC | Facility: CLINIC | Age: 47
End: 2022-03-22
Payer: COMMERCIAL

## 2022-03-22 ENCOUNTER — APPOINTMENT (OUTPATIENT)
Dept: LAB | Facility: CLINIC | Age: 47
End: 2022-03-22
Payer: COMMERCIAL

## 2022-03-22 ENCOUNTER — APPOINTMENT (OUTPATIENT)
Dept: RADIOLOGY | Facility: CLINIC | Age: 47
End: 2022-03-22
Payer: COMMERCIAL

## 2022-03-22 VITALS
BODY MASS INDEX: 27.19 KG/M2 | DIASTOLIC BLOOD PRESSURE: 88 MMHG | WEIGHT: 163.2 LBS | HEIGHT: 65 IN | HEART RATE: 96 BPM | SYSTOLIC BLOOD PRESSURE: 123 MMHG | TEMPERATURE: 96.2 F

## 2022-03-22 DIAGNOSIS — S80.12XA HEMATOMA OF LEFT LOWER EXTREMITY, INITIAL ENCOUNTER: ICD-10-CM

## 2022-03-22 DIAGNOSIS — G56.03 CARPAL TUNNEL SYNDROME, BILATERAL: ICD-10-CM

## 2022-03-22 DIAGNOSIS — M79.644 THUMB PAIN, RIGHT: ICD-10-CM

## 2022-03-22 DIAGNOSIS — M18.11 PRIMARY OSTEOARTHRITIS OF FIRST CARPOMETACARPAL JOINT OF RIGHT HAND: ICD-10-CM

## 2022-03-22 DIAGNOSIS — S80.12XA HEMATOMA OF LEFT LOWER EXTREMITY, INITIAL ENCOUNTER: Primary | ICD-10-CM

## 2022-03-22 LAB
ALBUMIN SERPL BCP-MCNC: 3.8 G/DL (ref 3.5–5)
ALP SERPL-CCNC: 88 U/L (ref 46–116)
ALT SERPL W P-5'-P-CCNC: 28 U/L (ref 12–78)
ANION GAP SERPL CALCULATED.3IONS-SCNC: 4 MMOL/L (ref 4–13)
APTT PPP: 29 SECONDS (ref 23–37)
AST SERPL W P-5'-P-CCNC: 19 U/L (ref 5–45)
BASOPHILS # BLD AUTO: 0.11 THOUSANDS/ΜL (ref 0–0.1)
BASOPHILS NFR BLD AUTO: 1 % (ref 0–1)
BILIRUB SERPL-MCNC: 0.37 MG/DL (ref 0.2–1)
BUN SERPL-MCNC: 12 MG/DL (ref 5–25)
CALCIUM SERPL-MCNC: 9.6 MG/DL (ref 8.3–10.1)
CHLORIDE SERPL-SCNC: 107 MMOL/L (ref 100–108)
CO2 SERPL-SCNC: 27 MMOL/L (ref 21–32)
CREAT SERPL-MCNC: 0.89 MG/DL (ref 0.6–1.3)
EOSINOPHIL # BLD AUTO: 0.18 THOUSAND/ΜL (ref 0–0.61)
EOSINOPHIL NFR BLD AUTO: 2 % (ref 0–6)
ERYTHROCYTE [DISTWIDTH] IN BLOOD BY AUTOMATED COUNT: 13.7 % (ref 11.6–15.1)
GFR SERPL CREATININE-BSD FRML MDRD: 76 ML/MIN/1.73SQ M
GLUCOSE SERPL-MCNC: 94 MG/DL (ref 65–140)
HCT VFR BLD AUTO: 47.5 % (ref 34.8–46.1)
HGB BLD-MCNC: 16.5 G/DL (ref 11.5–15.4)
IMM GRANULOCYTES # BLD AUTO: 0.07 THOUSAND/UL (ref 0–0.2)
IMM GRANULOCYTES NFR BLD AUTO: 1 % (ref 0–2)
INR PPP: 0.92 (ref 0.84–1.19)
LYMPHOCYTES # BLD AUTO: 2.58 THOUSANDS/ΜL (ref 0.6–4.47)
LYMPHOCYTES NFR BLD AUTO: 22 % (ref 14–44)
MCH RBC QN AUTO: 30.5 PG (ref 26.8–34.3)
MCHC RBC AUTO-ENTMCNC: 34.7 G/DL (ref 31.4–37.4)
MCV RBC AUTO: 88 FL (ref 82–98)
MONOCYTES # BLD AUTO: 0.99 THOUSAND/ΜL (ref 0.17–1.22)
MONOCYTES NFR BLD AUTO: 8 % (ref 4–12)
NEUTROPHILS # BLD AUTO: 7.98 THOUSANDS/ΜL (ref 1.85–7.62)
NEUTS SEG NFR BLD AUTO: 66 % (ref 43–75)
NRBC BLD AUTO-RTO: 0 /100 WBCS
PLATELET # BLD AUTO: 244 THOUSANDS/UL (ref 149–390)
PMV BLD AUTO: 11.6 FL (ref 8.9–12.7)
POTASSIUM SERPL-SCNC: 4.4 MMOL/L (ref 3.5–5.3)
PROT SERPL-MCNC: 7.7 G/DL (ref 6.4–8.2)
PROTHROMBIN TIME: 12 SECONDS (ref 11.6–14.5)
RBC # BLD AUTO: 5.41 MILLION/UL (ref 3.81–5.12)
SODIUM SERPL-SCNC: 138 MMOL/L (ref 136–145)
WBC # BLD AUTO: 11.91 THOUSAND/UL (ref 4.31–10.16)

## 2022-03-22 PROCEDURE — 85610 PROTHROMBIN TIME: CPT

## 2022-03-22 PROCEDURE — 80053 COMPREHEN METABOLIC PANEL: CPT

## 2022-03-22 PROCEDURE — 73140 X-RAY EXAM OF FINGER(S): CPT

## 2022-03-22 PROCEDURE — 36415 COLL VENOUS BLD VENIPUNCTURE: CPT

## 2022-03-22 PROCEDURE — 85025 COMPLETE CBC W/AUTO DIFF WBC: CPT

## 2022-03-22 PROCEDURE — 99213 OFFICE O/P EST LOW 20 MIN: CPT | Performed by: ORTHOPAEDIC SURGERY

## 2022-03-22 PROCEDURE — 85730 THROMBOPLASTIN TIME PARTIAL: CPT

## 2022-03-22 RX ORDER — VENLAFAXINE HYDROCHLORIDE 150 MG/1
150 CAPSULE, EXTENDED RELEASE ORAL DAILY
COMMUNITY
Start: 2022-02-23

## 2022-03-22 RX ORDER — CEFAZOLIN SODIUM 2 G/50ML
2000 SOLUTION INTRAVENOUS ONCE
Status: CANCELLED | OUTPATIENT
Start: 2022-03-22 | End: 2022-03-22

## 2022-03-22 NOTE — PROGRESS NOTES
Assessment/Plan:  1  Carpal tunnel syndrome, bilateral         Scribe Attestation    I,:  Liseth Osullivan MA am acting as a scribe while in the presence of the attending physician :       I,:  Magui Fisher,  personally performed the services described in this documentation    as scribed in my presence  :             ***    Subjective:   Eduard Barfield is a 48 y o  female who presents to the office today for follow up evaluation bilateral carpal tunnel syndrome  Patient underwent steroid injections at her last visit on 1/18/22 which she states provided her with good relief until appx one week ago  Patient states the injections provided her with complete relief  She notes numbness to her bilateral thumb, index, long, and ring fingers  She denies any numbness into her small fingers  She states her left hand is worse than her right  She notes pain base of her right thumb  She states the numbness does wake her from sleep at night  She does take Ibuprofen OTC as needed for pain  Review of Systems   Constitutional: Negative for chills and fever  HENT: Negative for drooling and sneezing  Eyes: Negative for redness  Respiratory: Negative for cough and wheezing  Gastrointestinal: Negative for nausea and vomiting  Musculoskeletal: Negative for arthralgias, joint swelling and myalgias  Neurological: Negative for weakness and numbness  Psychiatric/Behavioral: Negative for behavioral problems  The patient is not nervous/anxious  Past Medical History:   Diagnosis Date    Anxiety     Depression     [facility]icide attempt [facility])        History reviewed  No pertinent surgical history      Family History   Problem Relation Age of Onset    Depression Mother     Bipolar disorder Father     Schizoaffective Disorder  Sister        Social History     Occupational History    Not on file   Tobacco Use    Smoking status: Current Every Day Smoker     Packs/day: 0 50     Types: Cigarettes    Smokeless tobacco: Never Used   Substance and Sexual Activity    Alcohol use: No    Drug use: No    Sexual activity: Yes     Partners: Male     Birth control/protection: Spermicide         Current Outpatient Medications:     venlafaxine (EFFEXOR-XR) 150 mg 24 hr capsule, Take 150 mg by mouth daily, Disp: , Rfl:     EPINEPHrine (EPIPEN) 0 3 mg/0 3 mL SOAJ, Inject 0 3 mL (0 3 mg total) into a muscle once for 1 dose, Disp: 0 6 mL, Rfl: 0    EPINEPHrine (EPIPEN) 0 3 mg/0 3 mL SOAJ, Inject 0 3 mL (0 3 mg total) into a muscle once for 1 dose, Disp: 0 6 mL, Rfl: 0    gabapentin (NEURONTIN) 300 mg capsule, Take 1 capsule (300 mg total) by mouth daily at bedtime (Patient not taking: Reported on 1/18/2022 ), Disp: 30 capsule, Rfl: 1    ibuprofen (MOTRIN) 800 mg tablet, Take 1 tablet (800 mg total) by mouth 2 (two) times a day for 10 days, Disp: 20 tablet, Rfl: 0    LORazepam (ATIVAN) 1 mg tablet, Take 1 tablet (1 mg total) by mouth daily as needed for anxiety (moderate anxiety) for up to 7 days, Disp: 7 tablet, Rfl: 0    naproxen (NAPROSYN) 500 mg tablet, Take 1 tablet (500 mg total) by mouth 2 (two) times a day with meals (Patient not taking: Reported on 5/27/2021), Disp: 30 tablet, Rfl: 0    nicotine (NICODERM CQ) 21 mg/24 hr TD 24 hr patch, Place 1 patch on the skin daily (Patient not taking: Reported on 11/20/2021 ), Disp: 28 patch, Rfl: 0    traZODone (DESYREL) 150 mg tablet, Take 100 mg by mouth daily at bedtime   (Patient not taking: Reported on 6/17/2021), Disp: , Rfl:     Allergies   Allergen Reactions    Oxycodone-Acetaminophen Hives and Itching     vomiting and dizziness    Pentosan Polysulfate Hives and Itching    Pollen Extract     Sulfa Antibiotics Hives, Vomiting, Itching and Rash       Objective:  Vitals:    03/22/22 0932   BP: 123/88   Pulse: 96   Temp: (!) 96 2 °F (35 7 °C)       Ortho Exam     Right hand    TTP thumb CMC  - grind  No MCP hyperextension  + tinel's  + durkan's  No atrophy   Compartments soft  Brisk capillary refill  S/m intact median, radial, and ulnar nerve     Left hand    + tinel's  + durkan's  No atrophy   Compartments soft  Brisk capillary refill  S/m intact median, radial, and ulnar nerve     Physical Exam  Constitutional:       Appearance: She is well-developed  HENT:      Head: Normocephalic and atraumatic  Eyes:      General:         Right eye: No discharge  Left eye: No discharge  Conjunctiva/sclera: Conjunctivae normal    Cardiovascular:      Rate and Rhythm: Normal rate  Pulmonary:      Effort: Pulmonary effort is normal  No respiratory distress  Musculoskeletal:      Cervical back: Normal range of motion and neck supple  Comments: As noted in HPI   Skin:     General: Skin is warm and dry  Neurological:      Mental Status: She is alert and oriented to person, place, and time  Psychiatric:         Behavior: Behavior normal          Thought Content:  Thought content normal          Judgment: Judgment normal          I have personally reviewed pertinent films in PACS and my interpretation is as follows:  ***

## 2022-03-23 ENCOUNTER — TELEPHONE (OUTPATIENT)
Dept: OBGYN CLINIC | Facility: CLINIC | Age: 47
End: 2022-03-23

## 2022-03-23 NOTE — TELEPHONE ENCOUNTER
----- Message from Chelo Zeng DO sent at 3/22/2022  8:36 PM EDT -----  Patient has very mild abnormalities in her CBC    While they are likely normal I would like her seen by her PCP for clearance prior to surgery    Ty  d

## 2022-03-23 NOTE — TELEPHONE ENCOUNTER
I have left a voice message for Aubrey Jose asking her to schedule an appointment with her pcp for medical clearance  I asked that she return my call to let me know the date of her appointment so I may fax the lab results and clearance form to the pcp office

## 2022-03-23 NOTE — PROGRESS NOTES
Assessment/Plan:  1  History of Hematoma LE Case request operating room: RELEASE CARPAL TUNNEL    CBC and differential    Comprehensive metabolic panel    APTT    Protime-INR    Case request operating room: RELEASE CARPAL TUNNEL   2  Carpal tunnel syndrome, bilateral  Case request operating room: RELEASE CARPAL TUNNEL    CBC and differential    Comprehensive metabolic panel    APTT    Protime-INR    Case request operating room: RELEASE CARPAL TUNNEL   3  Thumb pain, right  XR thumb right first digit-min 2v       I personally saw and examined the patient  Patient does have right thumb pain at the base  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  This is not very bothersome for the patient although she does have some pain with palpation  Patient did undergo injection for the right carpal tunnel in January which she did note good relief and has no continued symptoms at this time  She declined any treatment for the carpal tunnel on the right hand today  She also declined any treatment for the right thumb as it is not very bothersome for her  With regards to the left hand we did discuss nonsurgical and surgical options  I did review the EMG again with her which demonstrates mild to moderate carpal tunnel syndrome  She did get relief from the injections however was not long lasting  She has exhausted all conservative treatments for the symptoms ofn umbness and tingling in her left hand over the past year  We did discuss surgical options in the form of left carpal tunnel release  Risks and benefits were explained  Risks including but not limited to infection, bleeding, nerve vessel damage, pain, recurrence, pillar pain, stiffness, incomplete release, need future surgery  Patient did understand this  after discussion regarding the nonsurgical and surgical options patient wishes to proceed with left carpal tunnel release  All questions were answered regarding the surgery    We described the surgery and postop course  Patient will be scheduled for surgery  We will obtain lab work if able all all and clearance prior to surgery  She did understand this  All questions were answered  Patient will meet with our   Patient is very happy with the discussion  Subjective:   Michelle Beckett is a 48 y o  female who presents for follow-up for her left carpal tunnel syndrome  She did undergo injection in January which provided good relief  The symptoms started to return recently  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 the symptoms as she has been working over the past 2 months  His numbness has been going on for many months  She did undergo injection as well with Dr Adrian León which did help however the shots did not work long-term  The numbness into the fingers of the median nerve distribution does interfere with her daily life as well as work  She is also complaining about pain in the right hand  She localizes this over the right thumb  She denies much numbness or tingling about the hand but does have some  She more so complains about pain of the right wrist over the base of the thumb      Review of Systems   Constitutional: Negative for chills, fever and unexpected weight change  HENT: Negative for hearing loss, nosebleeds and sore throat  Eyes: Negative for pain, redness and visual disturbance  Respiratory: Negative for cough, shortness of breath and wheezing  Cardiovascular: Negative for chest pain, palpitations and leg swelling  Gastrointestinal: Negative for abdominal pain, nausea and vomiting  Endocrine: Negative for polydipsia and polyuria  Genitourinary: Negative for dysuria and hematuria  Musculoskeletal:        See HPI   Skin: Negative for rash and wound  Neurological: Negative for dizziness, numbness and headaches  Psychiatric/Behavioral: Negative for decreased concentration and suicidal ideas  The patient is not nervous/anxious  Past Medical History:   Diagnosis Date    Anxiety     Depression     Suicide attempt [facility])        History reviewed  No pertinent surgical history      Family History   Problem Relation Age of Onset    Depression Mother     Bipolar disorder Father     Schizoaffective Disorder  Sister        Social History     Occupational History    Not on file   Tobacco Use    Smoking status: Current Every Day Smoker     Packs/day: 0 50     Types: Cigarettes    Smokeless tobacco: Never Used   Substance and Sexual Activity    Alcohol use: No    Drug use: No    Sexual activity: Yes     Partners: Male     Birth control/protection: Spermicide         Current Outpatient Medications:     venlafaxine (EFFEXOR-XR) 150 mg 24 hr capsule, Take 150 mg by mouth daily, Disp: , Rfl:     EPINEPHrine (EPIPEN) 0 3 mg/0 3 mL SOAJ, Inject 0 3 mL (0 3 mg total) into a muscle once for 1 dose, Disp: 0 6 mL, Rfl: 0    EPINEPHrine (EPIPEN) 0 3 mg/0 3 mL SOAJ, Inject 0 3 mL (0 3 mg total) into a muscle once for 1 dose, Disp: 0 6 mL, Rfl: 0    gabapentin (NEURONTIN) 300 mg capsule, Take 1 capsule (300 mg total) by mouth daily at bedtime (Patient not taking: Reported on 1/18/2022 ), Disp: 30 capsule, Rfl: 1    ibuprofen (MOTRIN) 800 mg tablet, Take 1 tablet (800 mg total) by mouth 2 (two) times a day for 10 days, Disp: 20 tablet, Rfl: 0    LORazepam (ATIVAN) 1 mg tablet, Take 1 tablet (1 mg total) by mouth daily as needed for anxiety (moderate anxiety) for up to 7 days, Disp: 7 tablet, Rfl: 0    naproxen (NAPROSYN) 500 mg tablet, Take 1 tablet (500 mg total) by mouth 2 (two) times a day with meals (Patient not taking: Reported on 5/27/2021), Disp: 30 tablet, Rfl: 0    nicotine (NICODERM CQ) 21 mg/24 hr TD 24 hr patch, Place 1 patch on the skin daily (Patient not taking: Reported on 11/20/2021 ), Disp: 28 patch, Rfl: 0    traZODone (DESYREL) 150 mg tablet, Take 100 mg by mouth daily at bedtime   (Patient not taking: Reported on 6/17/2021), Disp: , Rfl:     Allergies   Allergen Reactions    Oxycodone-Acetaminophen Hives and Itching     vomiting and dizziness    Pentosan Polysulfate Hives and Itching    Pollen Extract     Sulfa Antibiotics Hives, Vomiting, Itching and Rash       Objective:  Vitals:    03/22/22 0932   BP: 123/88   Pulse: 96   Temp: (!) 96 2 °F (35 7 °C)       Ortho Exam    Right hand  Tender over the right thumb CMC base   Negative Durkan's and negative Tinel's today   Compartment soft   Brisk cap refill   FDS FDP extensors are intact  Patient can make a full fist   No weakness about the hand     Left hand positive Tinel's   Positive Durkan's   Compartment soft   Brisk cap refill   No atrophy   5/5 opposition strength    Physical Exam  Vitals and nursing note reviewed  Constitutional:       Appearance: She is well-developed  HENT:      Head: Normocephalic and atraumatic  Eyes:      General: No scleral icterus  Conjunctiva/sclera: Conjunctivae normal    Cardiovascular:      Rate and Rhythm: Normal rate  Pulmonary:      Effort: Pulmonary effort is normal  No respiratory distress  Musculoskeletal:      Cervical back: Normal range of motion and neck supple  Comments: As noted in HPI   Skin:     General: Skin is warm and dry  Neurological:      Mental Status: She is alert and oriented to person, place, and time  Psychiatric:         Behavior: Behavior normal          I have personally reviewed pertinent films in PACS and my interpretation is as follows:  X-rays of the right thumb taken today demonstrate mild distension of the thumb CMC joint consistent with synovitis or early CMC arthritis  No other arthritis was noted

## 2022-03-24 ENCOUNTER — TELEPHONE (OUTPATIENT)
Dept: OBGYN CLINIC | Facility: CLINIC | Age: 47
End: 2022-03-24

## 2022-03-24 NOTE — TELEPHONE ENCOUNTER
Left message for Brandon Cota to return my call regarding her Ins benefits for o/p surgery  I left my direct number for her to return my call

## 2022-03-25 NOTE — TELEPHONE ENCOUNTER
Attempted to reach Idania Delaney a second time   No answer, but again, I left a voice message requesting a call back

## 2022-03-25 NOTE — TELEPHONE ENCOUNTER
Rivera Masters returned my call, left message  She did reach out to her insurance company and was provided the same info  She is not sure what she is going to do  She asked that I call her back  I returned her call, got her vm again    I left her my number

## 2022-03-28 ENCOUNTER — OFFICE VISIT (OUTPATIENT)
Dept: INTERNAL MEDICINE CLINIC | Facility: CLINIC | Age: 47
End: 2022-03-28
Payer: COMMERCIAL

## 2022-03-28 VITALS
BODY MASS INDEX: 26.86 KG/M2 | HEIGHT: 65 IN | WEIGHT: 161.2 LBS | RESPIRATION RATE: 18 BRPM | OXYGEN SATURATION: 97 % | HEART RATE: 112 BPM | DIASTOLIC BLOOD PRESSURE: 82 MMHG | TEMPERATURE: 97.7 F | SYSTOLIC BLOOD PRESSURE: 122 MMHG

## 2022-03-28 DIAGNOSIS — Z11.4 ENCOUNTER FOR SCREENING FOR HIV: ICD-10-CM

## 2022-03-28 DIAGNOSIS — Z11.59 ENCOUNTER FOR HEPATITIS C SCREENING TEST FOR LOW RISK PATIENT: ICD-10-CM

## 2022-03-28 DIAGNOSIS — F41.1 GENERALIZED ANXIETY DISORDER: ICD-10-CM

## 2022-03-28 DIAGNOSIS — F17.200 TOBACCO USE DISORDER: ICD-10-CM

## 2022-03-28 DIAGNOSIS — Z00.00 HEALTHCARE MAINTENANCE: ICD-10-CM

## 2022-03-28 DIAGNOSIS — F33.41 RECURRENT MAJOR DEPRESSIVE DISORDER, IN PARTIAL REMISSION (HCC): Primary | ICD-10-CM

## 2022-03-28 DIAGNOSIS — D75.1 ERYTHROCYTOSIS: ICD-10-CM

## 2022-03-28 DIAGNOSIS — L30.9 DERMATITIS: ICD-10-CM

## 2022-03-28 DIAGNOSIS — G56.02 CARPAL TUNNEL SYNDROME OF LEFT WRIST: ICD-10-CM

## 2022-03-28 DIAGNOSIS — Z12.31 VISIT FOR SCREENING MAMMOGRAM: ICD-10-CM

## 2022-03-28 DIAGNOSIS — Z12.11 COLON CANCER SCREENING: ICD-10-CM

## 2022-03-28 PROBLEM — F32.9 MAJOR DEPRESSIVE DISORDER: Status: ACTIVE | Noted: 2022-03-28

## 2022-03-28 PROCEDURE — 99204 OFFICE O/P NEW MOD 45 MIN: CPT | Performed by: NURSE PRACTITIONER

## 2022-03-28 RX ORDER — MOMETASONE FUROATE 1 MG/G
CREAM TOPICAL DAILY
Qty: 45 G | Refills: 0 | Status: SHIPPED | OUTPATIENT
Start: 2022-03-28 | End: 2022-04-14

## 2022-03-28 RX ORDER — MULTIVIT-MIN/IRON FUM/FOLIC AC 7.5 MG-4
1 TABLET ORAL DAILY
COMMUNITY

## 2022-03-28 NOTE — PATIENT INSTRUCTIONS
Osteo biflex triple strength twice daily  Turmeric 400 mg twice daily  Make appointment with gynecologist  Schedule mammogram       DASH Eating Plan   WHAT YOU NEED TO KNOW:   The DASH (Dietary Approaches to Stop Hypertension) Eating Plan is designed to help prevent or lower high blood pressure  It can also help to lower LDL (bad) cholesterol and decrease your risk for heart disease  The plan is low in sodium, sugar, unhealthy fats, and total fat  It is high in potassium, calcium, magnesium, and fiber  These nutrients are added when you eat more fruits, vegetables, and whole grains  With the DASH eating plan, you need to eat a certain number of servings from each food group  This will help you get enough of certain nutrients and limit others  The amount of servings you should eat depends on how many calories you need  Your dietitian can      DISCHARGE INSTRUCTIONS:   What you need to know about sodium:  Your dietitian will tell you how much sodium is safe for you to have each day  People with high blood pressure should have no more than 1,500 to 2,300 mg of sodium in a day  A teaspoon (tsp) of salt has 2,300 mg of sodium  This may seem like a difficult goal, but small changes to the foods you eat can make a big difference  Your healthcare provider or dietitian can help you create a meal plan that follows your sodium limit  · Read food labels  Food labels can help you choose foods that are low in sodium  The amount of sodium is listed in milligrams (mg)  The % Daily Value (DV) column tells you how much of your daily needs are met by 1 serving of the food for each nutrient listed  Choose foods that have less than 5% of the DV of sodium  These foods are considered low in sodium  Foods that have 20% or more of the DV of sodium are considered high in sodium  Avoid foods that have more than 300 mg of sodium in each serving  Choose foods that say low-sodium, reduced-sodium, or no salt added on the food label  · Limit added salt  Do not salt food at the table if you add salt when you cook  Use herbs and spices, such as onions, garlic, and salt-free seasonings to add flavor  Try lemon or lime juice or vinegar to add a tart flavor  Use hot peppers or a small amount of hot pepper sauce to add a spicy flavor  Limit foods high in added salt, such as the following:    ? Seasonings made with salt, such as garlic salt, celery salt, onion salt, seasoned salt, meat tenderizers, and monosodium glutamate (MSG)    ? Miso soup and canned or dried soup mixes    ? Regular soy sauce, barbecue sauce, teriyaki sauce, steak sauce, Worcestershire sauce, and most flavored vinegars    ? Snack foods, such as salted chips, popcorn, pretzels, pork rinds, salted crackers, and salted nuts    ? Frozen foods, such as dinners, entrees, vegetables with sauces, and breaded meats    · Ask about salt substitutes  Ask your healthcare provider if you may use salt substitutes  Some salt substitutes have ingredients that can be harmful if you have certain health conditions  · Choose foods carefully at restaurants  Meals from restaurants, especially fast food restaurants, are often high in sodium  Some restaurants have nutrition information that tells you the amount of sodium in their foods  Ask to have your food prepared with less, or no salt  What you need to know about fats:  Healthy fats include unsaturated fats and omega-3 fatty acids  Unhealthy fats include saturated fats and trans fats  · Include healthy fats, such as the following:      ? Cooking oils, such as soybean, canola, olive, or sunflower    ? Fatty fish, such as salmon, tuna, mackerel, or sardines    ? Flaxseed oil or ground flaxseed    ? ½ cup of cooked beans, such as black beans, kidney beans, or pineda beans    ? 1½ ounces of low-sodium nuts, such as almonds or walnuts    ? Low-sugar, low-sodium peanut butter    ?  Seeds such as balta seeds or sunflower seeds       · Limit or do not have unhealthy fats, such as the following:      ? Foods that contain fat from animals, such as fatty meats, whole milk, butter, and cream    ? Shortening, stick margarine, palm oil, and coconut oil    ? Full-fat or creamy salad dressing    ? Creamy soup    ? Crackers, chips, and baked goods made with margarine or shortening    ? Foods that are fried in unhealthy fats    ? Gravy and sauces, such as Keenan or cheese sauces    What you need to know about carbohydrates (carbs): All carbs break down into sugar  Complex carbs contain more fiber than simple carbs  This means complex carbs go into the bloodstream more slowly and cause less of a blood sugar spike  Try to include more complex carbs and fewer simple carbs  · Include complex carbs, such as the following:      ? 1 slice of whole-grain bread    ? 1 ounce of dry cereal that does not contain added sugar    ? ½ cup of cooked oatmeal    ? 2 ounces of cooked whole-grain pasta    ? ½ cup of cooked brown rice    · Limit or do not have simple carbs, such as the following:      ? Baked goods, such as doughnuts, pastries, and cookies    ? Mixes for cornbread and biscuits    ? White rice and pasta mixes, such as boxed macaroni and cheese    ? Instant and cold cereals that contain sugar    ? Jelly, jam, and ice cream that contain sugar    ? Condiments such as ketchup    ? Drinks high in sugar, such as soft drinks, lemonade, and fruit juice    What you need to know about vegetables and fruits:  Vegetables and fruits can be fresh, frozen, or canned  If possible, try to choose low-sodium canned options  · Include a variety of vegetables and fruits, such as the following:      ? 1 medium apple, pear, or peach (about ½ cup chopped)    ? ½ small banana    ? ½ cup berries, such as blueberries, strawberries, or blackberries    ? 1 cup of raw leafy greens, such as lettuce, spinach, kale, or bg greens    ?  ½ cup of frozen or canned (no added salt) vegetables, such as green beans    ? ½ cup of fresh, frozen, or canned fruit (canned in light syrup or fruit juice)    ? ½ cup of vegetable or fruit juice    · Limit or do not have vegetables and fruits made in the following ways:      ? Frozen fruit such as cherries that have added sugar    ? Fruit in cream or butter sauce    ? Canned vegetables that are high in sodium    ? Sauerkraut, pickled vegetables, and other foods prepared in brine    ? Fried vegetables or vegetables in butter or high-fat sauces    What you need to know about protein foods:   · Include lean or low-fat protein foods, such as the following:      ? Poultry (chicken, turkey) with no skin    ? Fish (especially fatty fish, such as salmon, fresh tuna, or mackerel)    ? Lean beef and pork (loin, round, extra lean hamburger)    ? Egg whites and egg substitutes    ? 1 cup of nonfat (skim) or 1% milk    ? 1½ ounces of fat-free or low-fat cheese    ? 6 ounces of nonfat or low-fat yogurt    · Limit or do not have high-fat protein foods, such as the following:      ? Smoked or cured meat, such as corned beef, lauren, ham, hot dogs, and sausage    ? Canned beans and canned meats or spreads, such as potted meats, sardines, anchovies, and imitation seafood    ? Deli or lunch meats, such as bologna, ham, turkey, and roast beef    ? High-fat meat (T-bone steak, regular hamburger, and ribs)    ? Whole eggs and egg yolks    ? Whole milk, 2% milk, and cream    ? Regular cheese and processed cheese    Other guidelines to follow:   · Maintain a healthy weight  Your risk for heart disease is higher if you are overweight  Your healthcare provider may suggest that you lose weight if you are overweight  You can lose weight by eating fewer calories and foods that have added sugars and fat  The DASH meal plan can help you do this  Decrease calories by eating smaller portions at each meal and fewer snacks   Ask your healthcare provider for more information about how to lose weight  · Exercise regularly  Regular exercise can help you reach or maintain a healthy weight  Regular exercise can also help decrease your blood pressure and improve your cholesterol levels  Get 30 minutes or more of moderate exercise each day of the week  To lose weight, get at least 60 minutes of exercise  Talk to your healthcare provider about the best exercise program for you  · Limit alcohol  Women should limit alcohol to 1 drink a day  Men should limit alcohol to 2 drinks a day  A drink of alcohol is 12 ounces of beer, 5 ounces of wine, or 1½ ounces of liquor  For more information:   · [facility] and [address]  [address] [address]  [place] [address]  Phone: 5- 684 - 071-0520  Web Address: [facility] no    © 6[facility] 2022 Information is for End User's use only and may not be sold, redistributed or otherwise used for commercial purposes  All illustrations and images included in CareNotes® are the copyrighted property of A D A M , Inc  or [address] [place], [place] [address]hola   The above information is an  only  It is not intended as medical advice for individual conditions or treatments  Talk to your doctor, nurse or pharmacist before following any medical regimen to see if it is safe and effective for you

## 2022-03-28 NOTE — ASSESSMENT & PLAN NOTE
The patient continues to follow with psychiatry  She continues on her  Effexor  She denies any suicidal ideations today  The patient did have a suicide attempt by overdose of trazodone in 2018

## 2022-03-28 NOTE — ASSESSMENT & PLAN NOTE
The patient continues to follow with Orthopedic group  She is scheduled for an upcoming carpal tunnel release on the left in April  She did have some abnormalities in her CBC and her orthopedic provider requested medical clearance  The patient will repeat this blood work in 2-3 days  This may be as result of the patient being slightly dehydrated at the time of the initial blood work  If her blood work is still abnormal further diagnostic testing will be performed

## 2022-03-28 NOTE — PROGRESS NOTES
INTERNAL MEDICINE INITIAL OFFICE VISIT  [facility] - [facility]    NAME: Ash Calhoun  AGE: 48 y o  SEX: female  : 1975     DATE: 2022     Assessment and Plan:     Problem List Items Addressed This Visit        Nervous and Auditory    Carpal tunnel syndrome of left wrist      The patient continues to follow with Orthopedic group  She is scheduled for an upcoming carpal tunnel release on the left in April  She did have some abnormalities in her CBC and her orthopedic provider requested medical clearance  The patient will repeat this blood work in 2-3 days  This may be as result of the patient being slightly dehydrated at the time of the initial blood work  If her blood work is still abnormal further diagnostic testing will be performed  Musculoskeletal and Integument    Dermatitis       The patient with areas of an eczema type rash on her  Right knee  Elocon cream sent to pharmacy         Relevant Medications    mometasone (ELOCON) 0 1 % cream       Other    Generalized anxiety disorder    Major depressive disorder - Primary      The patient continues to follow with psychiatry  She continues on her  Effexor  She denies any suicidal ideations today  The patient did have a suicide attempt by overdose of trazodone in 2018  Tobacco use disorder      The patient states that she continues to smoke up to a pack of cigarettes per day for the past 34 years  She had been prescribed nicotine patches in the past   She is not interested in smoking cessation at this time  Erythrocytosis         Patient continues with elevation in her red blood cell count, hemoglobin and hematocrit  The patient continues to smoke which may be a contributing factor  She was sent to Hematology for a consultation however did cancel that appointment last year        Component      Latest Ref Rng & Units 3/22/2022 3/31/2022   WBC      4 31 - 10 16 Thousand/uL 11 91 (H) 9 46   Red Blood Cell Count      3 81 - 5 12 Million/uL 5 41 (H) 5 46 (H)   Hemoglobin      11 5 - 15 4 g/dL 16 5 (H) 16 4 (H)   HCT      34 8 - 46 1 % 47 5 (H) 49 8 (H)              Other Visit Diagnoses     Encounter for hepatitis C screening test for low risk patient        Relevant Orders    Hepatitis C antibody (Completed)    Encounter for screening for HIV        Relevant Orders    HIV 1/2 Antigen/Antibody (4th Generation) w Reflex [facility] (Completed)    Healthcare maintenance        Relevant Orders    CBC and differential (Completed)    Lipid Panel with Direct LDL reflex (Completed)    Visit for screening mammogram        Relevant Orders    Mammo screening bilateral w 3d & cad    Colon cancer screening        Relevant Orders    Cologuard          Return in about 6 months (around 9/28/2022) for Jim Malavew, earleck  Chief Complaint:     Chief Complaint   Patient presents with    Establish Care    Pre-op Exam     carpal tunnel surgery      History of Present Illness:     Patient presents to the office today to establish care  She has upcoming carpal tunnel surgery scheduled and needs clearance  Patient's past medical, social, and family history were updated  The patient is overdue for a dental exam   She does have  Full upper dentures and a partial denture on the bottom  She is up-to-date with vision exams  Her periods are regular  She is premenopausal   Her last period was 2 months ago  She is overdue for a mammogram and this has been ordered  I did refer her back to her gynecologist for a Pap smear  She never did have a colonoscopy performed and Cologuard testing was ordered  She is complaining of some generalized joint pain  Osteo Bi-Flex triple strength was recommended  I will contact the patient with the blood work results that was ordered  The patient is cleared for her upcoming surgery from a medical standpoint    I believe her aide for cytosis is due to her continued smoking  She should be seen by Hematology at some point in the future for further recommendations  The following portions of the patient's history were reviewed and updated as appropriate: allergies, current medications, past family history, past medical history, past social history, past surgical history and problem list      Review of Systems:     Review of Systems   Constitutional: Negative for activity change, fatigue and fever  HENT: Negative for congestion, hearing loss, rhinorrhea, trouble swallowing and voice change  Eyes: Negative for photophobia, pain, discharge and visual disturbance  Respiratory: Negative for cough, chest tightness and shortness of breath  Cardiovascular: Negative for chest pain, palpitations and leg swelling  Gastrointestinal: Negative for abdominal pain, blood in stool, constipation, nausea and vomiting  Endocrine: Negative for cold intolerance and heat intolerance  Genitourinary: Negative for difficulty urinating, frequency, hematuria, urgency, vaginal bleeding and vaginal discharge  Musculoskeletal: Positive for arthralgias and myalgias  Skin: Positive for rash (right knee)  Neurological: Negative for dizziness, weakness, numbness and headaches  Psychiatric/Behavioral: Negative for decreased concentration  The patient is not nervous/anxious  Past Medical History:     Past Medical History:   Diagnosis Date    Anxiety     Carpal tunnel syndrome 03/2022    Depression     Suicide attempt [facility])         Past Surgical History:   History reviewed  No pertinent surgical history       Social History:     Social History     Socioeconomic History    Marital status: /Civil Union     Spouse name: None    Number of children: None    Years of education: None    Highest education level: None   Occupational History    None   Tobacco Use    Smoking status: Current Every Day Smoker     Packs/day: 1 00     Years: 34 00     Pack years: 34 00     Types: Cigarettes    Smokeless tobacco: Never Used   Vaping Use    Vaping Use: Never used   Substance and Sexual Activity    Alcohol use: No    Drug use: No    Sexual activity: Yes     Partners: Male     Birth control/protection: Spermicide   Other Topics Concern    None   Social History Narrative    None     Social Determinants of Health     Financial Resource Strain: Not on file   Food Insecurity: Not on file   Transportation Needs: Not on file   Physical Activity: Not on file   Stress: Stress Concern Present    Feeling of Stress : To some extent   Social Connections: Not on file   Intimate Partner Violence: Not on file   Housing Stability: Not on file         Family History:     Family History   Problem Relation Age of Onset    Depression Mother     Bipolar disorder Father     Schizoaffective Disorder  Sister         Current Medications:     Current Outpatient Medications:     EPINEPHrine (EPIPEN) 0 3 mg/0 3 mL SOAJ, Inject 0 3 mL (0 3 mg total) into a muscle once for 1 dose, Disp: 0 6 mL, Rfl: 0    ibuprofen (MOTRIN) 800 mg tablet, Take 1 tablet (800 mg total) by mouth 2 (two) times a day for 10 days (Patient not taking: Reported on 4/5/2022 ), Disp: 20 tablet, Rfl: 0    Multiple Vitamins-Minerals (multivitamin with minerals) tablet, Take 1 tablet by mouth daily, Disp: , Rfl:     Turmeric (QC TUMERIC COMPLEX PO), Take by mouth, Disp: , Rfl:     venlafaxine (EFFEXOR-XR) 150 mg 24 hr capsule, Take 150 mg by mouth daily, Disp: , Rfl:     mometasone (ELOCON) 0 1 % cream, Apply topically daily, Disp: 45 g, Rfl: 0     Allergies:      Allergies   Allergen Reactions    Wasp Venom Anaphylaxis    Oxycodone-Acetaminophen Hives and Itching     vomiting and dizziness    Pentosan Polysulfate Hives and Itching    Pollen Extract Other (See Comments)    Sulfa Antibiotics Hives, Vomiting, Itching and Rash    Sulfasalazine Itching and Rash        Physical Exam:     /82 (BP Location: Left arm, Patient Position: Sitting, Cuff Size: Standard)   Pulse (!) 112   Temp 97 7 °F (36 5 °C) (Temporal) Comment: NO NSAIDS  Resp 18   Ht 5' 5" (1 651 m)   Wt 73 1 kg (161 lb 3 2 oz)   SpO2 97%   BMI 26 83 kg/m²     Physical Exam  Constitutional:       General: She is not in acute distress  Appearance: Normal appearance  She is well-developed  HENT:      Head: Normocephalic and atraumatic  Right Ear: Tympanic membrane, ear canal and external ear normal  There is no impacted cerumen  Left Ear: Tympanic membrane, ear canal and external ear normal  There is no impacted cerumen  Nose: Nose normal       Mouth/Throat:      Mouth: Mucous membranes are moist       Dentition: Has dentures  Pharynx: Oropharynx is clear  No oropharyngeal exudate  Comments: Full upper dentures, partial lower dentures  Eyes:      General:         Right eye: No discharge  Left eye: No discharge  Conjunctiva/sclera: Conjunctivae normal       Pupils: Pupils are equal, round, and reactive to light  Neck:      Thyroid: No thyromegaly  Cardiovascular:      Rate and Rhythm: Normal rate and regular rhythm  Pulses: Normal pulses  Heart sounds: Normal heart sounds  No murmur heard  Pulmonary:      Effort: Pulmonary effort is normal  No respiratory distress  Breath sounds: Normal breath sounds  Abdominal:      General: Bowel sounds are normal  There is no distension  Palpations: Abdomen is soft  There is no mass  Tenderness: There is no abdominal tenderness  There is no guarding or rebound  Hernia: No hernia is present  Musculoskeletal:         General: Normal range of motion  Cervical back: Normal range of motion  Lymphadenopathy:      Cervical: No cervical adenopathy  Skin:     General: Skin is warm and dry  Neurological:      General: No focal deficit present  Mental Status: She is alert and oriented to person, place, and time     Psychiatric:         Mood and Affect: Mood normal          Behavior: Behavior normal          Thought Content: Thought content normal  Thought content does not include suicidal ideation  Thought content does not include suicidal plan  Judgment: Judgment normal        BMI Counseling: Body mass index is 26 83 kg/m²  The BMI is above normal  Nutrition recommendations include decreasing portion sizes, encouraging healthy choices of fruits and vegetables, consuming healthier snacks, limiting drinks that contain sugar, moderation in carbohydrate intake, increasing intake of lean protein, reducing intake of saturated and trans fat and reducing intake of cholesterol  Exercise recommendations include exercising 3-5 times per week  Rationale for BMI follow-up plan is due to patient being overweight or obese  Tobacco Cessation Counseling: Tobacco cessation counseling was provided  The patient is sincerely urged to quit consumption of tobacco  She is not ready to quit tobacco       Data:     Laboratory Results: I have personally reviewed the pertinent laboratory results/reports   Radiology/Other Diagnostic Testing Results: I have personally reviewed pertinent reports  Patient Instructions     Osteo biflex triple strength twice daily  Turmeric 400 mg twice daily  Make appointment with gynecologist  Schedule mammogram       DASH Eating Plan   WHAT YOU NEED TO KNOW:   The DASH (Dietary Approaches to Stop Hypertension) Eating Plan is designed to help prevent or lower high blood pressure  It can also help to lower LDL (bad) cholesterol and decrease your risk for heart disease  The plan is low in sodium, sugar, unhealthy fats, and total fat  It is high in potassium, calcium, magnesium, and fiber  These nutrients are added when you eat more fruits, vegetables, and whole grains  With the DASH eating plan, you need to eat a certain number of servings from each food group  This will help you get enough of certain nutrients and limit others   The amount of servings you should eat depends on how many calories you need  Your dietitian can       DISCHARGE INSTRUCTIONS:   What you need to know about sodium:  Your dietitian will tell you how much sodium is safe for you to have each day  People with high blood pressure should have no more than 1,500 to 2,300 mg of sodium in a day  A teaspoon (tsp) of salt has 2,300 mg of sodium  This may seem like a difficult goal, but small changes to the foods you eat can make a big difference  Your healthcare provider or dietitian can help you create a meal plan that follows your sodium limit  · Read food labels  Food labels can help you choose foods that are low in sodium  The amount of sodium is listed in milligrams (mg)  The % Daily Value (DV) column tells you how much of your daily needs are met by 1 serving of the food for each nutrient listed  Choose foods that have less than 5% of the DV of sodium  These foods are considered low in sodium  Foods that have 20% or more of the DV of sodium are considered high in sodium  Avoid foods that have more than 300 mg of sodium in each serving  Choose foods that say low-sodium, reduced-sodium, or no salt added on the food label  · Limit added salt  Do not salt food at the table if you add salt when you cook  Use herbs and spices, such as onions, garlic, and salt-free seasonings to add flavor  Try lemon or lime juice or vinegar to add a tart flavor  Use hot peppers or a small amount of hot pepper sauce to add a spicy flavor  Limit foods high in added salt, such as the following:    ? Seasonings made with salt, such as garlic salt, celery salt, onion salt, seasoned salt, meat tenderizers, and monosodium glutamate (MSG)    ? Miso soup and canned or dried soup mixes    ? Regular soy sauce, barbecue sauce, teriyaki sauce, steak sauce, Worcestershire sauce, and most flavored vinegars    ?  Snack foods, such as salted chips, popcorn, pretzels, pork rinds, salted crackers, and salted nuts    ? Frozen foods, such as dinners, entrees, vegetables with sauces, and breaded meats    · Ask about salt substitutes  Ask your healthcare provider if you may use salt substitutes  Some salt substitutes have ingredients that can be harmful if you have certain health conditions  · Choose foods carefully at restaurants  Meals from restaurants, especially fast food restaurants, are often high in sodium  Some restaurants have nutrition information that tells you the amount of sodium in their foods  Ask to have your food prepared with less, or no salt  What you need to know about fats:  Healthy fats include unsaturated fats and omega-3 fatty acids  Unhealthy fats include saturated fats and trans fats  · Include healthy fats, such as the following:      ? Cooking oils, such as soybean, canola, olive, or sunflower    ? Fatty fish, such as salmon, tuna, mackerel, or sardines    ? Flaxseed oil or ground flaxseed    ? ½ cup of cooked beans, such as black beans, kidney beans, or pineda beans    ? 1½ ounces of low-sodium nuts, such as almonds or walnuts    ? Low-sugar, low-sodium peanut butter    ? Seeds such as balta seeds or sunflower seeds       · Limit or do not have unhealthy fats, such as the following:      ? Foods that contain fat from animals, such as fatty meats, whole milk, butter, and cream    ? Shortening, stick margarine, palm oil, and coconut oil    ? Full-fat or creamy salad dressing    ? Creamy soup    ? Crackers, chips, and baked goods made with margarine or shortening    ? Foods that are fried in unhealthy fats    ? Gravy and sauces, such as Keenan or cheese sauces    What you need to know about carbohydrates (carbs): All carbs break down into sugar  Complex carbs contain more fiber than simple carbs  This means complex carbs go into the bloodstream more slowly and cause less of a blood sugar spike  Try to include more complex carbs and fewer simple carbs    · Include complex carbs, such as the following:      ? 1 slice of whole-grain bread    ? 1 ounce of dry cereal that does not contain added sugar    ? ½ cup of cooked oatmeal    ? 2 ounces of cooked whole-grain pasta    ? ½ cup of cooked brown rice    · Limit or do not have simple carbs, such as the following:      ? Baked goods, such as doughnuts, pastries, and cookies    ? Mixes for cornbread and biscuits    ? White rice and pasta mixes, such as boxed macaroni and cheese    ? Instant and cold cereals that contain sugar    ? Jelly, jam, and ice cream that contain sugar    ? Condiments such as ketchup    ? Drinks high in sugar, such as soft drinks, lemonade, and fruit juice    What you need to know about vegetables and fruits:  Vegetables and fruits can be fresh, frozen, or canned  If possible, try to choose low-sodium canned options  · Include a variety of vegetables and fruits, such as the following:      ? 1 medium apple, pear, or peach (about ½ cup chopped)    ? ½ small banana    ? ½ cup berries, such as blueberries, strawberries, or blackberries    ? 1 cup of raw leafy greens, such as lettuce, spinach, kale, or bg greens    ? ½ cup of frozen or canned (no added salt) vegetables, such as green beans    ? ½ cup of fresh, frozen, or canned fruit (canned in light syrup or fruit juice)    ? ½ cup of vegetable or fruit juice    · Limit or do not have vegetables and fruits made in the following ways:      ? Frozen fruit such as cherries that have added sugar    ? Fruit in cream or butter sauce    ? Canned vegetables that are high in sodium    ? Sauerkraut, pickled vegetables, and other foods prepared in brine    ? Fried vegetables or vegetables in butter or high-fat sauces    What you need to know about protein foods:   · Include lean or low-fat protein foods, such as the following:      ? Poultry (chicken, turkey) with no skin    ? Fish (especially fatty fish, such as salmon, fresh tuna, or mackerel)    ?  Lean beef and pork (loin, round, extra lean hamburger)    ? Egg whites and egg substitutes    ? 1 cup of nonfat (skim) or 1% milk    ? 1½ ounces of fat-free or low-fat cheese    ? 6 ounces of nonfat or low-fat yogurt    · Limit or do not have high-fat protein foods, such as the following:      ? Smoked or cured meat, such as corned beef, lauren, ham, hot dogs, and sausage    ? Canned beans and canned meats or spreads, such as potted meats, sardines, anchovies, and imitation seafood    ? Deli or lunch meats, such as bologna, ham, turkey, and roast beef    ? High-fat meat (T-bone steak, regular hamburger, and ribs)    ? Whole eggs and egg yolks    ? Whole milk, 2% milk, and cream    ? Regular cheese and processed cheese    Other guidelines to follow:   · Maintain a healthy weight  Your risk for heart disease is higher if you are overweight  Your healthcare provider may suggest that you lose weight if you are overweight  You can lose weight by eating fewer calories and foods that have added sugars and fat  The DASH meal plan can help you do this  Decrease calories by eating smaller portions at each meal and fewer snacks  Ask your healthcare provider for more information about how to lose weight  · Exercise regularly  Regular exercise can help you reach or maintain a healthy weight  Regular exercise can also help decrease your blood pressure and improve your cholesterol levels  Get 30 minutes or more of moderate exercise each day of the week  To lose weight, get at least 60 minutes of exercise  Talk to your healthcare provider about the best exercise program for you  · Limit alcohol  Women should limit alcohol to 1 drink a day  Men should limit alcohol to 2 drinks a day  A drink of alcohol is 12 ounces of beer, 5 ounces of wine, or 1½ ounces of liquor  For more information:   · National [facility] and [address]  [address]  [place] [address]  Phone: 4- 256 - 879-2459  Web Address: [employer] no    © Copyright [employer] 2022 Information is for End User's use only and may not be sold, redistributed or otherwise used for commercial purposes  All illustrations and images included in CareNotes® are the copyrighted property of A D A M , Inc  or Jing Garcia  The above information is an  only  It is not intended as medical advice for individual conditions or treatments  Talk to your doctor, nurse or pharmacist before following any medical regimen to see if it is safe and effective for you            MONICA Cloud  [facility]

## 2022-03-28 NOTE — ASSESSMENT & PLAN NOTE
The patient states that she continues to smoke up to a pack of cigarettes per day for the past 34 years  She had been prescribed nicotine patches in the past   She is not interested in smoking cessation at this time

## 2022-03-29 ENCOUNTER — TELEPHONE (OUTPATIENT)
Dept: ADMINISTRATIVE | Facility: OTHER | Age: 47
End: 2022-03-29

## 2022-03-29 NOTE — TELEPHONE ENCOUNTER
Spoke with Gadiel Webb, provided her the number for Junesly Stephen in finance at [facility] to see if she would qualify for alexa care  I also provided her the number for  and the cpt code (33670) to be able to obtain the oop cost if she proceeds with surgery  She appreciated the information

## 2022-03-29 NOTE — TELEPHONE ENCOUNTER
Upon review of the In Basket request we could not locate a Mammogram report in Care Everywhere  Any additional questions or concerns should be emailed to the Practice Liaisons via [email or web address] email, please do not reply via In Basket      Thank you  Terell Thao MA

## 2022-03-29 NOTE — TELEPHONE ENCOUNTER
----- Message from Cristy Rose, [address] [place] sent at 3/28/2022 11:27 AM EDT -----  Regarding: HIV, Pap and Mammogram  03/28/22 11:39 AM    Hello, our patient Rd Jones has had HIV, Mammogram, and Pap Smear (HPV) aka Cervical Cancer Screening completed/performed  Please assist in updating the patient chart by pulling the Care Everywhere (CE) document  The date of service is 02/20/03, 01/24/14 AND 10/23/02       Thank you,  Yolanda Callahan MA  [facility]  [address]

## 2022-03-29 NOTE — TELEPHONE ENCOUNTER
Upon review of the In Basket request we were able to locate, review, and update the patient chart as requested for Pap Smear (HPV) aka Cervical Cancer Screening  and have found that the patient has aged out of the measure and/or the document date is outside of the measure timeframe  DOS 10/23/2002 & 01/29/2014    Any additional questions or concerns should be emailed to the Practice Liaisons via [email or web address] email, please do not reply via In Basket      Thank you  [place] BOZENA Marcus

## 2022-03-29 NOTE — TELEPHONE ENCOUNTER
Upon review of the In Basket request we were able to locate, review, and update the patient chart as requested for HIV  Any additional questions or concerns should be emailed to the Practice Liaisons via [email or web address] email, please do not reply via In Basket      Thank you  Carl Gauthier MA

## 2022-03-31 ENCOUNTER — APPOINTMENT (OUTPATIENT)
Dept: LAB | Facility: CLINIC | Age: 47
End: 2022-03-31
Payer: COMMERCIAL

## 2022-03-31 ENCOUNTER — TELEPHONE (OUTPATIENT)
Dept: OBGYN CLINIC | Facility: HOSPITAL | Age: 47
End: 2022-03-31

## 2022-03-31 DIAGNOSIS — Z11.4 ENCOUNTER FOR SCREENING FOR HIV: ICD-10-CM

## 2022-03-31 DIAGNOSIS — Z00.00 HEALTHCARE MAINTENANCE: ICD-10-CM

## 2022-03-31 DIAGNOSIS — Z11.59 ENCOUNTER FOR HEPATITIS C SCREENING TEST FOR LOW RISK PATIENT: ICD-10-CM

## 2022-03-31 LAB
BASOPHILS # BLD AUTO: 0.13 THOUSANDS/ΜL (ref 0–0.1)
BASOPHILS NFR BLD AUTO: 1 % (ref 0–1)
CHOLEST SERPL-MCNC: 261 MG/DL
EOSINOPHIL # BLD AUTO: 0.19 THOUSAND/ΜL (ref 0–0.61)
EOSINOPHIL NFR BLD AUTO: 2 % (ref 0–6)
ERYTHROCYTE [DISTWIDTH] IN BLOOD BY AUTOMATED COUNT: 13.9 % (ref 11.6–15.1)
HCT VFR BLD AUTO: 49.8 % (ref 34.8–46.1)
HCV AB SER QL: NORMAL
HDLC SERPL-MCNC: 36 MG/DL
HGB BLD-MCNC: 16.4 G/DL (ref 11.5–15.4)
IMM GRANULOCYTES # BLD AUTO: 0.05 THOUSAND/UL (ref 0–0.2)
IMM GRANULOCYTES NFR BLD AUTO: 1 % (ref 0–2)
LDLC SERPL CALC-MCNC: 178 MG/DL (ref 0–100)
LYMPHOCYTES # BLD AUTO: 2.54 THOUSANDS/ΜL (ref 0.6–4.47)
LYMPHOCYTES NFR BLD AUTO: 27 % (ref 14–44)
MCH RBC QN AUTO: 30 PG (ref 26.8–34.3)
MCHC RBC AUTO-ENTMCNC: 32.9 G/DL (ref 31.4–37.4)
MCV RBC AUTO: 91 FL (ref 82–98)
MONOCYTES # BLD AUTO: 0.73 THOUSAND/ΜL (ref 0.17–1.22)
MONOCYTES NFR BLD AUTO: 8 % (ref 4–12)
NEUTROPHILS # BLD AUTO: 5.82 THOUSANDS/ΜL (ref 1.85–7.62)
NEUTS SEG NFR BLD AUTO: 61 % (ref 43–75)
NRBC BLD AUTO-RTO: 0 /100 WBCS
PLATELET # BLD AUTO: 254 THOUSANDS/UL (ref 149–390)
PMV BLD AUTO: 11.5 FL (ref 8.9–12.7)
RBC # BLD AUTO: 5.46 MILLION/UL (ref 3.81–5.12)
TRIGL SERPL-MCNC: 235 MG/DL
WBC # BLD AUTO: 9.46 THOUSAND/UL (ref 4.31–10.16)

## 2022-03-31 PROCEDURE — 86803 HEPATITIS C AB TEST: CPT

## 2022-03-31 PROCEDURE — 87389 HIV-1 AG W/HIV-1&-2 AB AG IA: CPT

## 2022-03-31 PROCEDURE — 36415 COLL VENOUS BLD VENIPUNCTURE: CPT

## 2022-03-31 PROCEDURE — 80061 LIPID PANEL: CPT

## 2022-03-31 PROCEDURE — 85025 COMPLETE CBC W/AUTO DIFF WBC: CPT

## 2022-03-31 NOTE — TELEPHONE ENCOUNTER
Hello,  Please advise if the following patient can be forced onto the schedule:    Patient: Marci Damian    XQE:[id number]    MRN:    Call back #: [phone]    Insurance:  [facility]    Reason for appointment:  Patient had sx with Dr Chester Sprague scheduled, but she is unable to continue with him because she has applied for [facility] and is a [place]  Requested doctor/location:  [facility] [place] only    Thank you

## 2022-04-01 LAB — HIV 1+2 AB+HIV1 P24 AG SERPL QL IA: NORMAL

## 2022-04-05 ENCOUNTER — TELEPHONE (OUTPATIENT)
Dept: INTERNAL MEDICINE CLINIC | Facility: CLINIC | Age: 47
End: 2022-04-05

## 2022-04-05 ENCOUNTER — OFFICE VISIT (OUTPATIENT)
Dept: OBGYN CLINIC | Facility: MEDICAL CENTER | Age: 47
End: 2022-04-05
Payer: COMMERCIAL

## 2022-04-05 ENCOUNTER — TELEPHONE (OUTPATIENT)
Dept: OBGYN CLINIC | Facility: CLINIC | Age: 47
End: 2022-04-05

## 2022-04-05 VITALS
WEIGHT: 161.6 LBS | BODY MASS INDEX: 26.92 KG/M2 | RESPIRATION RATE: 16 BRPM | SYSTOLIC BLOOD PRESSURE: 125 MMHG | HEIGHT: 65 IN | HEART RATE: 77 BPM | DIASTOLIC BLOOD PRESSURE: 82 MMHG

## 2022-04-05 DIAGNOSIS — G56.03 CARPAL TUNNEL SYNDROME, BILATERAL: Primary | ICD-10-CM

## 2022-04-05 PROBLEM — D75.1 ERYTHROCYTOSIS: Status: ACTIVE | Noted: 2022-04-05

## 2022-04-05 PROBLEM — E78.2 MIXED HYPERLIPIDEMIA: Status: ACTIVE | Noted: 2022-04-05

## 2022-04-05 PROCEDURE — 99214 OFFICE O/P EST MOD 30 MIN: CPT | Performed by: ORTHOPAEDIC SURGERY

## 2022-04-05 RX ORDER — CEFAZOLIN SODIUM 2 G/50ML
2000 SOLUTION INTRAVENOUS ONCE
Status: CANCELLED | OUTPATIENT
Start: 2022-04-05 | End: 2022-04-05

## 2022-04-05 RX ORDER — CHLORHEXIDINE GLUCONATE 0.12 MG/ML
15 RINSE ORAL ONCE
Status: CANCELLED | OUTPATIENT
Start: 2022-04-05 | End: 2022-04-05

## 2022-04-05 NOTE — TELEPHONE ENCOUNTER
Tariq West called in, l/v/m to cancel surgery with Dr Eze Salcido  She did have Ins issues - no coverage for o/p surgery  Thus she wanted to see a doctor in [place] (this way she can apply for [facility], as she lives in [place])  She did see Dr Kenyon Nagy yesterday and is moving forward with him for her surgery  She did thank us for the time we did spend with her

## 2022-04-05 NOTE — H&P (VIEW-ONLY)
[place] [facility] - REJI L KRAKAU [facility] CARE SPECIALISTS NATALIIA Ray [address] [place] [id number]       Fredy Carmona  [id number]  1975    ORTHOPAEDIC SURGERY OUTPATIENT NOTE  4/5/2022      HISTORY:  48 y o  female  presents complaining of bilateral carpal tunnel syndrome  Patient was seen by my colleague Dr Karla reyes and was scheduled for left carpal tunnel release however due to insurance issues he had to be done in [place]  She has pain and numbness and tingling the 1st 4 digits  She has failed extensive conservative management at this time wishes to undergo surgical release of the left carpal tunnel  Past Medical History:   Diagnosis Date    Anxiety     Carpal tunnel syndrome 03/2022    Depression     Suicide attempt [facility])        History reviewed  No pertinent surgical history  Social History     Socioeconomic History    Marital status: /Civil Union     Spouse name: Not on file    Number of children: Not on file    Years of education: Not on file    Highest education level: Not on file   Occupational History    Not on file   Tobacco Use    Smoking status: Current Every Day Smoker     Packs/day: 1 00     Years: 34 00     Pack years: 34 00     Types: Cigarettes    Smokeless tobacco: Never Used   Vaping Use    Vaping Use: Never used   Substance and Sexual Activity    Alcohol use: No    Drug use: No    Sexual activity: Yes     Partners: Male     Birth control/protection: Spermicide   Other Topics Concern    Not on file   Social History Narrative    Not on file     Social Determinants of Health     Financial Resource Strain: Not on file   Food Insecurity: Not on file   Transportation Needs: Not on file   Physical Activity: Not on file   Stress: Stress Concern Present    Feeling of Stress :  To some extent   Social Connections: Not on file   Intimate Partner Violence: Not on file   Housing Stability: Not on file       Family History   Problem Relation Age of Onset    Depression Mother     Bipolar disorder Father     Schizoaffective Disorder  Sister         Patient's Medications   New Prescriptions    No medications on file   Previous Medications    EPINEPHRINE (EPIPEN) 0 3 MG/0 3 ML SOAJ    Inject 0 3 mL (0 3 mg total) into a muscle once for 1 dose    IBUPROFEN (MOTRIN) 800 MG TABLET    Take 1 tablet (800 mg total) by mouth 2 (two) times a day for 10 days    MOMETASONE (ELOCON) 0 1 % CREAM    Apply topically daily    MULTIPLE VITAMINS-MINERALS (MULTIVITAMIN WITH MINERALS) TABLET    Take 1 tablet by mouth daily    TURMERIC (QC TUMERIC COMPLEX PO)    Take by mouth    VENLAFAXINE (EFFEXOR-XR) 150 MG 24 HR CAPSULE    Take 150 mg by mouth daily   Modified Medications    No medications on file   Discontinued Medications    No medications on file       Allergies   Allergen Reactions    Wasp Venom Anaphylaxis    Oxycodone-Acetaminophen Hives and Itching     vomiting and dizziness    Pentosan Polysulfate Hives and Itching    Pollen Extract Other (See Comments)    Sulfa Antibiotics Hives, Vomiting, Itching and Rash    Sulfasalazine Itching and Rash        /82 (BP Location: Left arm, Patient Position: Sitting)   Pulse 77   Resp 16   Ht 5' 5" (1 651 m)   Wt 73 3 kg (161 lb 9 6 oz)   BMI 26 89 kg/m²      REVIEW OF SYSTEMS:  Constitutional: Negative  HEENT: Negative  Respiratory: Negative  Skin: Negative  Neurological: Negative  Psychiatric/Behavioral: Negative  Musculoskeletal: Negative except for that mentioned in the HPI      PHYSICAL EXAM:     Right hand:    Durkan's test: Negative  Tinel's test: Negative  Phalen's test: Negative  Median Nerve: Normal  Ulnar Nerve: Normal  Thenar Muscle atrophy: Negative  Hypothenar Muscle atrophy: Negative    Left Hand:     Durkan's test: Negative  Tinel's test:  Positive  Phalen's test: Negative  Median Nerve: Normal  Ulnar Nerve: Normal  Thenar Muscle atrophy: Negative  Hypothenar Muscle atrophy: Negative        ASSESSMENT AND PLAN:  48 y o  female  with bilateral carpal tunnel syndrome, left greater than right  Patient did agree to undergo left carpal tunnel release at this time  Agree upon an open procedure  The patient understands the risks and benefits of the procedure with risks including pain, stiffness, infection, neurovascular injury, recurrence of symptoms, failure of surgical procedure, inadvertent intraoperative complications, blood loss, blood clots, allergic reaction to anesthesia, stroke, heart attack, all up to and including to death  The patient understood and did consent for surgery today

## 2022-04-05 NOTE — TELEPHONE ENCOUNTER
Left the patient a message on her cell phone regarding her results  Her RBC counts continue to be slightly elevated  She was sent to heme Onc several years ago but canceled that appointment  The most probable cause is either dehydration or her continued smoking  I am unsure if her surgeon would want this worked up prior to any surgery  In addition cholesterol levels are still elevated and she should probably go on a statin

## 2022-04-05 NOTE — ASSESSMENT & PLAN NOTE
Patient continues with elevation in her red blood cell count, hemoglobin and hematocrit  The patient continues to smoke which may be a contributing factor  She was sent to Hematology for a consultation however did cancel that appointment last year        Component      Latest Ref Rng & Units 3/22/2022 3/31/2022   WBC      4 31 - 10 16 Thousand/uL 11 91 (H) 9 46   Red Blood Cell Count      3 81 - 5 12 Million/uL 5 41 (H) 5 46 (H)   Hemoglobin      11 5 - 15 4 g/dL 16 5 (H) 16 4 (H)   HCT      34 8 - 46 1 % 47 5 (H) 49 8 (H)

## 2022-04-05 NOTE — PROGRESS NOTES
[facility] - REJI L KRAKAU [facility] CARE SPECIALISTS [facility] ASHLEY Ray [address] [place] [id number]       Aletha Scott  [id number]  1975    ORTHOPAEDIC SURGERY OUTPATIENT NOTE  4/5/2022      HISTORY:  48 y o  female  presents complaining of bilateral carpal tunnel syndrome  Patient was seen by my colleague Dr Anu reyes and was scheduled for left carpal tunnel release however due to insurance issues he had to be done in [place]  She has pain and numbness and tingling the 1st 4 digits  She has failed extensive conservative management at this time wishes to undergo surgical release of the left carpal tunnel  Past Medical History:   Diagnosis Date    Anxiety     Carpal tunnel syndrome 03/2022    Depression     Suicide attempt [facility])        History reviewed  No pertinent surgical history  Social History     Socioeconomic History    Marital status: /Civil Union     Spouse name: Not on file    Number of children: Not on file    Years of education: Not on file    Highest education level: Not on file   Occupational History    Not on file   Tobacco Use    Smoking status: Current Every Day Smoker     Packs/day: 1 00     Years: 34 00     Pack years: 34 00     Types: Cigarettes    Smokeless tobacco: Never Used   Vaping Use    Vaping Use: Never used   Substance and Sexual Activity    Alcohol use: No    Drug use: No    Sexual activity: Yes     Partners: Male     Birth control/protection: Spermicide   Other Topics Concern    Not on file   Social History Narrative    Not on file     Social Determinants of Health     Financial Resource Strain: Not on file   Food Insecurity: Not on file   Transportation Needs: Not on file   Physical Activity: Not on file   Stress: Stress Concern Present    Feeling of Stress :  To some extent   Social Connections: Not on file   Intimate Partner Violence: Not on file   Housing Stability: Not on file       Family History   Problem Relation Age of Onset    Depression Mother     Bipolar disorder Father     Schizoaffective Disorder  Sister         Patient's Medications   New Prescriptions    No medications on file   Previous Medications    EPINEPHRINE (EPIPEN) 0 3 MG/0 3 ML SOAJ    Inject 0 3 mL (0 3 mg total) into a muscle once for 1 dose    IBUPROFEN (MOTRIN) 800 MG TABLET    Take 1 tablet (800 mg total) by mouth 2 (two) times a day for 10 days    MOMETASONE (ELOCON) 0 1 % CREAM    Apply topically daily    MULTIPLE VITAMINS-MINERALS (MULTIVITAMIN WITH MINERALS) TABLET    Take 1 tablet by mouth daily    TURMERIC (QC TUMERIC COMPLEX PO)    Take by mouth    VENLAFAXINE (EFFEXOR-XR) 150 MG 24 HR CAPSULE    Take 150 mg by mouth daily   Modified Medications    No medications on file   Discontinued Medications    No medications on file       Allergies   Allergen Reactions    Wasp Venom Anaphylaxis    Oxycodone-Acetaminophen Hives and Itching     vomiting and dizziness    Pentosan Polysulfate Hives and Itching    Pollen Extract Other (See Comments)    Sulfa Antibiotics Hives, Vomiting, Itching and Rash    Sulfasalazine Itching and Rash        /82 (BP Location: Left arm, Patient Position: Sitting)   Pulse 77   Resp 16   Ht 5' 5" (1 651 m)   Wt 73 3 kg (161 lb 9 6 oz)   BMI 26 89 kg/m²      REVIEW OF SYSTEMS:  Constitutional: Negative  HEENT: Negative  Respiratory: Negative  Skin: Negative  Neurological: Negative  Psychiatric/Behavioral: Negative  Musculoskeletal: Negative except for that mentioned in the HPI      PHYSICAL EXAM:     Right hand:    Durkan's test: Negative  Tinel's test: Negative  Phalen's test: Negative  Median Nerve: Normal  Ulnar Nerve: Normal  Thenar Muscle atrophy: Negative  Hypothenar Muscle atrophy: Negative    Left Hand:     Durkan's test: Negative  Tinel's test:  Positive  Phalen's test: Negative  Median Nerve: Normal  Ulnar Nerve: Normal  Thenar Muscle atrophy: Negative  Hypothenar Muscle atrophy: Negative        ASSESSMENT AND PLAN:  48 y o  female  with bilateral carpal tunnel syndrome, left greater than right  Patient did agree to undergo left carpal tunnel release at this time  Agree upon an open procedure  The patient understands the risks and benefits of the procedure with risks including pain, stiffness, infection, neurovascular injury, recurrence of symptoms, failure of surgical procedure, inadvertent intraoperative complications, blood loss, blood clots, allergic reaction to anesthesia, stroke, heart attack, all up to and including to death  The patient understood and did consent for surgery today

## 2022-04-05 NOTE — ASSESSMENT & PLAN NOTE
Patient's most recent lipid panel is elevated  Her ASCVD risk score is elevated  I have contacted the patient after this visit regarding these results  I do believe she would benefit from a statin at this point in time      Component      Latest Ref Rng & Units 3/31/2022   Cholesterol      See Comment mg/dL 261 (H)   Triglycerides      See Comment mg/dL 235 (H)   HDL      >=50 mg/dL 36 (L)   LDL Calculated      0 - 100 mg/dL 178 (H)       The 10-year ASCVD risk score (Greta Cha et al , 2013) is: 7 9%    Values used to calculate the score:      Age: 48 years      Sex: Female      Is Non- : No      Diabetic: No      Tobacco smoker: Yes      Systolic Blood Pressure: 589 mmHg      Is BP treated: No      HDL Cholesterol: 36 mg/dL      Total Cholesterol: 261 mg/dL

## 2022-04-05 NOTE — H&P
[place] [facility] - REJI L KRAKAU [facility] CARE SPECIALISTS [facility] ASHLEY Ray [address] [place] [id number]       Brittni Joshi  [id number]  1975    ORTHOPAEDIC SURGERY OUTPATIENT NOTE  4/5/2022      HISTORY:  48 y o  female  presents complaining of bilateral carpal tunnel syndrome  Patient was seen by my colleague Dr Conchita reyes and was scheduled for left carpal tunnel release however due to insurance issues he had to be done [place]  She has pain and numbness and tingling the 1st 4 digits  She has failed extensive conservative management at this time wishes to undergo surgical release of the left carpal tunnel  Past Medical History:   Diagnosis Date    Anxiety     Carpal tunnel syndrome 03/2022    Depression     Suicide attempt [facility])        History reviewed  No pertinent surgical history  Social History     Socioeconomic History    Marital status: /Civil Union     Spouse name: Not on file    Number of children: Not on file    Years of education: Not on file    Highest education level: Not on file   Occupational History    Not on file   Tobacco Use    Smoking status: Current Every Day Smoker     Packs/day: 1 00     Years: 34 00     Pack years: 34 00     Types: Cigarettes    Smokeless tobacco: Never Used   Vaping Use    Vaping Use: Never used   Substance and Sexual Activity    Alcohol use: No    Drug use: No    Sexual activity: Yes     Partners: Male     Birth control/protection: Spermicide   Other Topics Concern    Not on file   Social History Narrative    Not on file     Social Determinants of Health     Financial Resource Strain: Not on file   Food Insecurity: Not on file   Transportation Needs: Not on file   Physical Activity: Not on file   Stress: Stress Concern Present    Feeling of Stress :  To some extent   Social Connections: Not on file   Intimate Partner Violence: Not on file   Housing Stability: Not on file       Family History   Problem Relation Age of Onset    Depression Mother     Bipolar disorder Father     Schizoaffective Disorder  Sister         Patient's Medications   New Prescriptions    No medications on file   Previous Medications    EPINEPHRINE (EPIPEN) 0 3 MG/0 3 ML SOAJ    Inject 0 3 mL (0 3 mg total) into a muscle once for 1 dose    IBUPROFEN (MOTRIN) 800 MG TABLET    Take 1 tablet (800 mg total) by mouth 2 (two) times a day for 10 days    MOMETASONE (ELOCON) 0 1 % CREAM    Apply topically daily    MULTIPLE VITAMINS-MINERALS (MULTIVITAMIN WITH MINERALS) TABLET    Take 1 tablet by mouth daily    TURMERIC (QC TUMERIC COMPLEX PO)    Take by mouth    VENLAFAXINE (EFFEXOR-XR) 150 MG 24 HR CAPSULE    Take 150 mg by mouth daily   Modified Medications    No medications on file   Discontinued Medications    No medications on file       Allergies   Allergen Reactions    Wasp Venom Anaphylaxis    Oxycodone-Acetaminophen Hives and Itching     vomiting and dizziness    Pentosan Polysulfate Hives and Itching    Pollen Extract Other (See Comments)    Sulfa Antibiotics Hives, Vomiting, Itching and Rash    Sulfasalazine Itching and Rash        /82 (BP Location: Left arm, Patient Position: Sitting)   Pulse 77   Resp 16   Ht 5' 5" (1 651 m)   Wt 73 3 kg (161 lb 9 6 oz)   BMI 26 89 kg/m²      REVIEW OF SYSTEMS:  Constitutional: Negative  HEENT: Negative  Respiratory: Negative  Skin: Negative  Neurological: Negative  Psychiatric/Behavioral: Negative  Musculoskeletal: Negative except for that mentioned in the HPI      PHYSICAL EXAM:     Right hand:    Durkan's test: Negative  Tinel's test: Negative  Phalen's test: Negative  Median Nerve: Normal  Ulnar Nerve: Normal  Thenar Muscle atrophy: Negative  Hypothenar Muscle atrophy: Negative    Left Hand:     Durkan's test: Negative  Tinel's test:  Positive  Phalen's test: Negative  Median Nerve: Normal  Ulnar Nerve: Normal  Thenar Muscle atrophy: Negative  Hypothenar Muscle atrophy: Negative        ASSESSMENT AND PLAN:  48 y o  female  with bilateral carpal tunnel syndrome, left greater than right  Patient did agree to undergo left carpal tunnel release at this time  Agree upon an open procedure  The patient understands the risks and benefits of the procedure with risks including pain, stiffness, infection, neurovascular injury, recurrence of symptoms, failure of surgical procedure, inadvertent intraoperative complications, blood loss, blood clots, allergic reaction to anesthesia, stroke, heart attack, all up to and including to death  The patient understood and did consent for surgery today

## 2022-04-14 ENCOUNTER — ANESTHESIA EVENT (OUTPATIENT)
Dept: PERIOP | Facility: HOSPITAL | Age: 47
End: 2022-04-14
Payer: COMMERCIAL

## 2022-04-14 NOTE — PRE-PROCEDURE INSTRUCTIONS
Pre-Surgery Instructions:   Medication Instructions    Multiple Vitamins-Minerals (multivitamin with minerals) tablet Hold day of surgery   Turmeric (QC TUMERIC COMPLEX PO) Hold day of surgery   venlafaxine (EFFEXOR-XR) 150 mg 24 hr capsule Take day of surgery  Pt denies fever, sob  Sore throat and cough  Pt verbalized understanding of shower and med instructions  Pt instructed to stop nsaids and supplements prior to surgery

## 2022-04-15 ENCOUNTER — ANESTHESIA (OUTPATIENT)
Dept: PERIOP | Facility: HOSPITAL | Age: 47
End: 2022-04-15
Payer: COMMERCIAL

## 2022-04-15 ENCOUNTER — HOSPITAL ENCOUNTER (OUTPATIENT)
Facility: HOSPITAL | Age: 47
Setting detail: OUTPATIENT SURGERY
Discharge: HOME | End: 2022-04-15
Attending: ORTHOPAEDIC SURGERY | Admitting: ORTHOPAEDIC SURGERY
Payer: COMMERCIAL

## 2022-04-15 VITALS
HEART RATE: 77 BPM | RESPIRATION RATE: 17 BRPM | SYSTOLIC BLOOD PRESSURE: 119 MMHG | WEIGHT: 161 LBS | TEMPERATURE: 96.9 F | BODY MASS INDEX: 26.79 KG/M2 | DIASTOLIC BLOOD PRESSURE: 73 MMHG | OXYGEN SATURATION: 99 %

## 2022-04-15 DIAGNOSIS — G56.02 CARPAL TUNNEL SYNDROME OF LEFT WRIST: Primary | ICD-10-CM

## 2022-04-15 PROCEDURE — 64721 CARPAL TUNNEL SURGERY: CPT | Performed by: ORTHOPAEDIC SURGERY

## 2022-04-15 PROCEDURE — 64721 CARPAL TUNNEL SURGERY: CPT | Performed by: PHYSICIAN ASSISTANT

## 2022-04-15 RX ORDER — CEFAZOLIN SODIUM 2 G/50ML
2000 SOLUTION INTRAVENOUS ONCE
Status: COMPLETED | OUTPATIENT
Start: 2022-04-15 | End: 2022-04-15

## 2022-04-15 RX ORDER — DEXAMETHASONE SODIUM PHOSPHATE 10 MG/ML
INJECTION, SOLUTION INTRAMUSCULAR; INTRAVENOUS AS NEEDED
Status: DISCONTINUED | OUTPATIENT
Start: 2022-04-15 | End: 2022-04-15

## 2022-04-15 RX ORDER — MIDAZOLAM HYDROCHLORIDE 2 MG/2ML
INJECTION, SOLUTION INTRAMUSCULAR; INTRAVENOUS AS NEEDED
Status: DISCONTINUED | OUTPATIENT
Start: 2022-04-15 | End: 2022-04-15

## 2022-04-15 RX ORDER — LIDOCAINE HYDROCHLORIDE 10 MG/ML
0.5 INJECTION, SOLUTION EPIDURAL; INFILTRATION; INTRACAUDAL; PERINEURAL ONCE AS NEEDED
Status: DISCONTINUED | OUTPATIENT
Start: 2022-04-15 | End: 2022-04-15 | Stop reason: HOSPADM

## 2022-04-15 RX ORDER — PROPOFOL 10 MG/ML
INJECTION, EMULSION INTRAVENOUS CONTINUOUS PRN
Status: DISCONTINUED | OUTPATIENT
Start: 2022-04-15 | End: 2022-04-15

## 2022-04-15 RX ORDER — LIDOCAINE HYDROCHLORIDE AND EPINEPHRINE 10; 10 MG/ML; UG/ML
INJECTION, SOLUTION INFILTRATION; PERINEURAL AS NEEDED
Status: DISCONTINUED | OUTPATIENT
Start: 2022-04-15 | End: 2022-04-15 | Stop reason: HOSPADM

## 2022-04-15 RX ORDER — FENTANYL CITRATE/PF 50 MCG/ML
25 SYRINGE (ML) INJECTION
Status: DISCONTINUED | OUTPATIENT
Start: 2022-04-15 | End: 2022-04-15 | Stop reason: HOSPADM

## 2022-04-15 RX ORDER — CHLORHEXIDINE GLUCONATE 0.12 MG/ML
15 RINSE ORAL ONCE
Status: DISCONTINUED | OUTPATIENT
Start: 2022-04-15 | End: 2022-04-15 | Stop reason: HOSPADM

## 2022-04-15 RX ORDER — PROPOFOL 10 MG/ML
INJECTION, EMULSION INTRAVENOUS AS NEEDED
Status: DISCONTINUED | OUTPATIENT
Start: 2022-04-15 | End: 2022-04-15

## 2022-04-15 RX ORDER — SODIUM CHLORIDE, SODIUM LACTATE, POTASSIUM CHLORIDE, CALCIUM CHLORIDE 600; 310; 30; 20 MG/100ML; MG/100ML; MG/100ML; MG/100ML
125 INJECTION, SOLUTION INTRAVENOUS CONTINUOUS
Status: DISCONTINUED | OUTPATIENT
Start: 2022-04-15 | End: 2022-04-15 | Stop reason: HOSPADM

## 2022-04-15 RX ORDER — SENNOSIDES 8.6 MG
650 CAPSULE ORAL EVERY 8 HOURS PRN
Qty: 30 TABLET | Refills: 0 | Status: SHIPPED | OUTPATIENT
Start: 2022-04-15

## 2022-04-15 RX ORDER — FENTANYL CITRATE 50 UG/ML
INJECTION, SOLUTION INTRAMUSCULAR; INTRAVENOUS AS NEEDED
Status: DISCONTINUED | OUTPATIENT
Start: 2022-04-15 | End: 2022-04-15

## 2022-04-15 RX ORDER — ONDANSETRON 2 MG/ML
4 INJECTION INTRAMUSCULAR; INTRAVENOUS ONCE AS NEEDED
Status: DISCONTINUED | OUTPATIENT
Start: 2022-04-15 | End: 2022-04-15 | Stop reason: HOSPADM

## 2022-04-15 RX ORDER — BUPIVACAINE HYDROCHLORIDE 5 MG/ML
INJECTION, SOLUTION PERINEURAL AS NEEDED
Status: DISCONTINUED | OUTPATIENT
Start: 2022-04-15 | End: 2022-04-15 | Stop reason: HOSPADM

## 2022-04-15 RX ORDER — LIDOCAINE HYDROCHLORIDE 10 MG/ML
INJECTION, SOLUTION EPIDURAL; INFILTRATION; INTRACAUDAL; PERINEURAL AS NEEDED
Status: DISCONTINUED | OUTPATIENT
Start: 2022-04-15 | End: 2022-04-15

## 2022-04-15 RX ORDER — METOCLOPRAMIDE HYDROCHLORIDE 5 MG/ML
10 INJECTION INTRAMUSCULAR; INTRAVENOUS ONCE AS NEEDED
Status: DISCONTINUED | OUTPATIENT
Start: 2022-04-15 | End: 2022-04-15 | Stop reason: HOSPADM

## 2022-04-15 RX ORDER — ONDANSETRON 2 MG/ML
INJECTION INTRAMUSCULAR; INTRAVENOUS AS NEEDED
Status: DISCONTINUED | OUTPATIENT
Start: 2022-04-15 | End: 2022-04-15

## 2022-04-15 RX ORDER — MAGNESIUM HYDROXIDE 1200 MG/15ML
LIQUID ORAL AS NEEDED
Status: DISCONTINUED | OUTPATIENT
Start: 2022-04-15 | End: 2022-04-15 | Stop reason: HOSPADM

## 2022-04-15 RX ORDER — CEPHALEXIN 500 MG/1
500 CAPSULE ORAL EVERY 6 HOURS SCHEDULED
Qty: 8 CAPSULE | Refills: 0 | Status: SHIPPED | OUTPATIENT
Start: 2022-04-15 | End: 2022-04-17

## 2022-04-15 RX ADMIN — CEFAZOLIN SODIUM 2000 MG: 2 SOLUTION INTRAVENOUS at 07:23

## 2022-04-15 RX ADMIN — DEXAMETHASONE SODIUM PHOSPHATE 5 MG: 10 INJECTION, SOLUTION INTRAMUSCULAR; INTRAVENOUS at 07:27

## 2022-04-15 RX ADMIN — MIDAZOLAM HYDROCHLORIDE 2 MG: 1 INJECTION, SOLUTION INTRAMUSCULAR; INTRAVENOUS at 07:19

## 2022-04-15 RX ADMIN — SODIUM CHLORIDE, SODIUM LACTATE, POTASSIUM CHLORIDE, AND CALCIUM CHLORIDE: .6; .31; .03; .02 INJECTION, SOLUTION INTRAVENOUS at 07:19

## 2022-04-15 RX ADMIN — PROPOFOL 100 MCG/KG/MIN: 10 INJECTION, EMULSION INTRAVENOUS at 07:23

## 2022-04-15 RX ADMIN — FENTANYL CITRATE 100 MCG: 50 INJECTION, SOLUTION INTRAMUSCULAR; INTRAVENOUS at 07:23

## 2022-04-15 RX ADMIN — ONDANSETRON 4 MG: 2 INJECTION INTRAMUSCULAR; INTRAVENOUS at 07:27

## 2022-04-15 RX ADMIN — PROPOFOL 50 MG: 10 INJECTION, EMULSION INTRAVENOUS at 07:23

## 2022-04-15 RX ADMIN — LIDOCAINE HYDROCHLORIDE 50 MG: 10 INJECTION, SOLUTION EPIDURAL; INFILTRATION; INTRACAUDAL; PERINEURAL at 07:23

## 2022-04-15 NOTE — OP NOTE
OPERATIVE REPORT  PATIENT NAME: Dylon Peterson    :  1975  MRN: [id number]  Pt Location: [facility] OR ROOM 02    SURGERY DATE: 4/15/2022    Surgeon(s) and Role:     * Jonathon Deleon - Primary     * Gem Mcguire PA-C - Assisting    Preop Diagnosis:  Carpal tunnel syndrome, bilateral [G56 03]    Post-Op Diagnosis Codes:     * Carpal tunnel syndrome, bilateral [G56 03]    Procedure(s) (LRB):  RELEASE CARPAL TUNNEL (Left)    Specimen(s):  * No specimens in log *    Estimated Blood Loss:   Minimal    Drains:  * No LDAs found *    Anesthesia Type:   Choice    Operative Indications:  Carpal tunnel syndrome, bilateral [G56 03]      Operative Findings: Thickened transverse carpal ligament    Complications:   None    INDICATIONS AND HISTORY:  This is a 48 y o  female who was diagnosed with left carpal tunnel syndrome  Patient failed conservative management and was indicated for left carpal tunnel release  The patient understood the risks and benefits of the procedure with risks including pain, recurrent symptoms, neurovascular injury, blood loss, blood clots, infection, wound dehiscence, stiffness, stroke, heart attack, all up to including death  Patient understood and consented for surgery today  DESCRIPTION OF OPERATION:  The patient was identified, and the procedure was verified  The patient was seen in pre-op holding area where the operative extremity was marked  The patient was taken to the operating room and placed in supine position  A time-out was called  The patient was administered IV antibiotics prior to incision  Using a 15-blade knife, incision made over the carpal tunnel  Subcutaneous dissection was taken down to the palmar fascia  Palmar fascia was incised and transverse carpal ligament was identified  The transverse carpal ligament was then incised and the median nerve was identified    Once the median nerve was protected, the transverse carpal ligament was fully released proximally and distally with combination of the 15-blade knife and Metzenbaum scissors  Once the transverse carpal ligament was released, the median nerve was evaluated using a Force elevator and showed  that it was completely released from the transverse carpal ligament  The tourniquet was taken down  Hemostasis was undertaken with bipolar cautery  The wound was copiously irrigated with normal saline  The wound was then closed with 3-0 nylon in a vertical mattress fashion  Dressings included Xeroform, 4 x 4 gauze, Lucía, and Ace bandage dressing  Patient tolerated the procedure well  There were no complications throughout the  case  Patient to PACU in stable condition  A physician assistant was medically necessary to help retract tissue during the procedure  The physician assistant role reduces the time of procedure and the time the patient is under anesthesia and/or tourniquet time  No qualified resident was available for the procedure            Patient Disposition:  PACU       SIGNATURE: Jonathon Deleon  DATE: April 15, 2022  TIME: 7:18 AM

## 2022-04-15 NOTE — ANESTHESIA POSTPROCEDURE EVALUATION
Post-Op Assessment Note    CV Status:  Stable  Pain Score: 0    Pain management: adequate     Mental Status:  Alert and awake   Hydration Status:  Euvolemic and stable   PONV Controlled:  Controlled   Airway Patency:  Patent      Post Op Vitals Reviewed: Yes      Staff: CRNA         No complications documented      BP   97/61   Temp      Pulse 88   Resp 16   SpO2 96

## 2022-04-15 NOTE — ANESTHESIA PREPROCEDURE EVALUATION
Procedure:  RELEASE CARPAL TUNNEL (Left Wrist)    Relevant Problems   CARDIO   (+) Mixed hyperlipidemia      NEURO/PSYCH   (+) Generalized anxiety disorder   (+) Major depressive disorder   (+) Major depressive disorder, recurrent severe without psychotic features [facility])        Physical Exam    Airway    Mallampati score: II         Dental   No notable dental hx     Cardiovascular      Pulmonary      Other Findings        Anesthesia Plan  ASA Score- 2     Anesthesia Type- IV sedation with anesthesia with ASA Monitors  Additional Monitors:   Airway Plan:     Comment: I, Dr Red Quick, the attending physician, have personally seen and evaluated the patient prior to anesthetic care  I have reviewed the pre-anesthetic record, and other medical records if appropriate to the anesthetic care  If a CRNA is involved in the case, I have reviewed the CRNA assessment, if present, and agree  The patient is in a suitable condition to proceed with my formulated anesthetic plan          Plan Factors-    Chart reviewed  Induction- intravenous  Postoperative Plan-     Informed Consent- Anesthetic plan and risks discussed with patient  I personally reviewed this patient with the CRNA  Discussed and agreed on the Anesthesia Plan with the CRNA  Yuri Cavazos

## 2022-04-22 ENCOUNTER — OFFICE VISIT (OUTPATIENT)
Dept: OBGYN CLINIC | Facility: CLINIC | Age: 47
End: 2022-04-22

## 2022-04-22 VITALS
HEIGHT: 65 IN | SYSTOLIC BLOOD PRESSURE: 116 MMHG | OXYGEN SATURATION: 95 % | BODY MASS INDEX: 26.82 KG/M2 | WEIGHT: 161 LBS | HEART RATE: 102 BPM | DIASTOLIC BLOOD PRESSURE: 70 MMHG

## 2022-04-22 DIAGNOSIS — Z47.89 AFTERCARE FOLLOWING SURGERY OF THE MUSCULOSKELETAL SYSTEM: Primary | ICD-10-CM

## 2022-04-22 PROCEDURE — 99024 POSTOP FOLLOW-UP VISIT: CPT | Performed by: ORTHOPAEDIC SURGERY

## 2022-04-22 NOTE — PROGRESS NOTES
Tammy Duran is 1 week post-op:  Left carpal tunnel release  Presenting for routine follow-up  States her symptoms have completely resolved and she is very pleased  S:  Doing well  Patient has noted no excessive redness, swelling and pain  O:  Wound healing well  No signs of infection  A:  Satisfactory course  P: Sutures removed  Patient to return in 3 weeks  Patient is to return as needed for redness, swelling, discomfort, or any concern about her surgery

## 2022-05-19 ENCOUNTER — OFFICE VISIT (OUTPATIENT)
Dept: OBGYN CLINIC | Facility: CLINIC | Age: 47
End: 2022-05-19

## 2022-05-19 VITALS
HEIGHT: 65 IN | SYSTOLIC BLOOD PRESSURE: 110 MMHG | DIASTOLIC BLOOD PRESSURE: 62 MMHG | WEIGHT: 163.2 LBS | BODY MASS INDEX: 27.19 KG/M2 | HEART RATE: 106 BPM

## 2022-05-19 DIAGNOSIS — Z47.89 AFTERCARE FOLLOWING SURGERY OF THE MUSCULOSKELETAL SYSTEM: Primary | ICD-10-CM

## 2022-05-19 PROCEDURE — 99024 POSTOP FOLLOW-UP VISIT: CPT | Performed by: ORTHOPAEDIC SURGERY

## 2022-05-19 NOTE — PROGRESS NOTES
[address] [address]  Isidra Paredes  [id number]  1975    ORTHOPAEDIC SURGERY OUTPATIENT NOTE  5/19/2022      HISTORY:  48 y o  female who presents the office today 5 weeks status post left carpal tunnel release, DOS 4/15/2022  She states that she is doing well postoperatively  She does not sensitivity over the scar  She notes intermittent numbness and tingling about her right hand  She states that it is worse at nighttime and will wake her up  She states that she would be interested in pursing a right carpal tunnel release      Past Medical History:   Diagnosis Date    Anxiety     Carpal tunnel syndrome 03/2022    Depression     Suicide attempt [facility])        Past Surgical History:   Procedure Laterality Date    KNEE ARTHROSCOPY      knee     AL REVISE MEDIAN N/CARPAL TUNNEL SURG Left 4/15/2022    Procedure: RELEASE CARPAL TUNNEL;  Surgeon: Marixa Mcgowan;  Location: [facility];  Service: Orthopedics    WISDOM TOOTH EXTRACTION         Social History     Socioeconomic History    Marital status: /Civil Union     Spouse name: Not on file    Number of children: Not on file    Years of education: Not on file    Highest education level: Not on file   Occupational History    Not on file   Tobacco Use    Smoking status: Current Every Day Smoker     Packs/day: 0 50     Years: 34 00     Pack years: 17 00     Types: Cigarettes    Smokeless tobacco: Never Used   Vaping Use    Vaping Use: Never used   Substance and Sexual Activity    Alcohol use: Never    Drug use: No    Sexual activity: Yes     Partners: Male     Birth control/protection: Spermicide   Other Topics Concern    Not on file   Social History Narrative    Not on file     Social Determinants of Health     Financial Resource Strain: Not on file   Food Insecurity: Not on file   Transportation Needs: Not on file Physical Activity: Not on file   Stress: Stress Concern Present    Feeling of Stress : To some extent   Social Connections: Not on file   Intimate Partner Violence: Not on file   Housing Stability: Not on file       Family History   Problem Relation Age of Onset    Depression Mother     Bipolar disorder Father     Schizoaffective Disorder  Sister         Patient's Medications   New Prescriptions    No medications on file   Previous Medications    ACETAMINOPHEN (TYLENOL) 650 MG CR TABLET    Take 1 tablet (650 mg total) by mouth every 8 (eight) hours as needed for mild pain    MULTIPLE VITAMINS-MINERALS (MULTIVITAMIN WITH MINERALS) TABLET    Take 1 tablet by mouth daily    TURMERIC (QC TUMERIC COMPLEX PO)    Take by mouth in the morning      VENLAFAXINE (EFFEXOR-XR) 150 MG 24 HR CAPSULE    Take 150 mg by mouth daily   Modified Medications    No medications on file   Discontinued Medications    No medications on file       Allergies   Allergen Reactions    Wasp Venom Anaphylaxis    Oxycodone-Acetaminophen Hives and Itching     vomiting and dizziness    Pentosan Polysulfate Hives and Itching    Pollen Extract Other (See Comments)    Sulfa Antibiotics Hives, Vomiting, Itching and Rash    Sulfasalazine Itching and Rash        There were no vitals taken for this visit  REVIEW OF SYSTEMS:  Constitutional: Negative  HEENT: Negative  Respiratory: Negative  Skin: Negative  Neurological: Negative  Psychiatric/Behavioral: Negative  Musculoskeletal: Negative except for that mentioned in the HPI      PHYSICAL EXAM:  Left Wrist  Well healed scar  No erythema, warmth or signs of infection  Equal  strength compared to contralateral side  Compartments soft  Brisk capillary refill  S/m intact median, radial, and ulnar nerve     IMAGING: no new images reviewed today    ASSESSMENT AND PLAN:  48 y o  female 5 weeks s/p left carpal tunnel release, DOS 4/15/2022    Overall, she is doing well postoperatively  Desensitizing of the incision can take months to resolve  She was instructed to message the scar with lotion 5 minutes per day  She may perform activities of daily living as tolerated  The patient is interested in pursuing a right carpal tunnel release in the future  I discussed with the patient that she should call the office whenever she would like to discuss surgical intervention of a right carpal tunnel release  She understood and had no further questions      Scribe Attestation    I,:  Renaldo Lombard am acting as a scribe while in the presence of the attending physician :       I,:  Deandre Goss personally performed the services described in this documentation    as scribed in my presence :

## 2022-06-16 ENCOUNTER — TELEPHONE (OUTPATIENT)
Dept: INTERNAL MEDICINE CLINIC | Facility: CLINIC | Age: 47
End: 2022-06-16

## 2022-08-30 NOTE — PSYCH
History of Present Illness  [facility] Clinical Progress Note [facility]:   Specialized Services Documentation - Therapist must complete separate progress note for each specific clinical activity in which the client participated during the day  ( ) Other 1200PM This CM contacted Annika Stovall at insurance to request that UR be rescheduled for tomorrow due to Aubrey Garcia not using last covered day yesterday or today due to snow  UR rescheduled for 1/18/18  Irina Yeung, RN     Case Management Note:   0800 Aubrey Garcia called to say she would not be able to attend Program due to heavy snow in her area  She will try to make it to Program tomorrow  Medications not changed/added/denied  Irina Yeung RN      Active Problems    1  Generalized anxiety disorder (300 02) (F41 1)   2  Major depressive disorder, recurrent severe without psychotic features (296 33) (F33 2)    Past Medical History    1  Denied: History of seizure   2  Denied: History of traumatic injury of head    Allergies    1  Percocet TABS    Current Meds   1  ARIPiprazole 2 MG Oral Tablet; TAKE 1 TABLET DAILY; Therapy: (Recorded:12Jan2018) to Recorded   2  DULoxetine HCl - 30 MG Oral Capsule Delayed Release Particles; TAKE 1 CAPSULE   Bedtime; Therapy: (Recorded:11Jan2018) to Recorded   3  DULoxetine HCl - 60 MG Oral Capsule Delayed Release Particles; TAKE 1 CAPSULE   Bedtime; Therapy: (Recorded:11Jan2018) to Recorded   4  LORazepam 0 5 MG Oral Tablet; Take 1 tablet twice daily; Therapy: (Recorded:11Jan2018) to Recorded   5  TraZODone HCl - 150 MG Oral Tablet; TAKE 1 TABLET Bedtime; Therapy: (Recorded:11Jan2018) to Recorded    Family Psych History  Father    1  Family history of depression (V17 0) (Z81 8)  Sister    2   Family history of paranoid schizophrenia (V17 0) (Z81 8)    Social History    · Current every day smoker (305 1) (F17 200)   · Daily caffeine consumption   · Employed   · Graduated from high school   ·     Future Appointments    Date/Time Provider Specialty Site   01/18/2018 11:15 AM KAR Luther  Psychiatry [facility]   01/19/2018 11:45 AM KAR Luther   Psychiatry [facility]     Signatures   Electronically signed by : Sera Christopher RN; Jan 17 2018 11:09AM EST                       (Author) [facility]

## 2022-10-18 ENCOUNTER — OFFICE VISIT (OUTPATIENT)
Dept: OBGYN CLINIC | Facility: MEDICAL CENTER | Age: 47
End: 2022-10-18
Payer: COMMERCIAL

## 2022-10-18 VITALS
DIASTOLIC BLOOD PRESSURE: 86 MMHG | RESPIRATION RATE: 16 BRPM | SYSTOLIC BLOOD PRESSURE: 130 MMHG | BODY MASS INDEX: 25.83 KG/M2 | HEART RATE: 112 BPM | WEIGHT: 155 LBS | HEIGHT: 65 IN

## 2022-10-18 DIAGNOSIS — G56.01 CARPAL TUNNEL SYNDROME ON RIGHT: Primary | ICD-10-CM

## 2022-10-18 PROCEDURE — 99214 OFFICE O/P EST MOD 30 MIN: CPT | Performed by: ORTHOPAEDIC SURGERY

## 2022-10-18 RX ORDER — CHLORHEXIDINE GLUCONATE 4 G/100ML
SOLUTION TOPICAL DAILY PRN
OUTPATIENT
Start: 2022-10-18

## 2022-10-18 RX ORDER — CHLORHEXIDINE GLUCONATE 0.12 MG/ML
15 RINSE ORAL ONCE
OUTPATIENT
Start: 2022-10-18 | End: 2022-10-18

## 2022-10-18 RX ORDER — CEFAZOLIN SODIUM 1 G/50ML
1000 SOLUTION INTRAVENOUS ONCE
OUTPATIENT
Start: 2022-10-18 | End: 2022-10-18

## 2022-10-18 NOTE — PROGRESS NOTES
[place] [facility] - REJI L KRAKAU [facility] CARE SPECIALISTS [facility] ASHLEY Ray [address] [place] [id number]       Kylah Lora  [id number]  1975    ORTHOPAEDIC SURGERY OUTPATIENT NOTE  10/18/2022      HISTORY:  48 y o  female  presents for right carpal tunnel syndrome  She had a left carpal tunnel release on 04/15/2022  The hand is doing well  She continues with pain and numbness in the right hand  She is interested in setting up for the right hand carpal tunnel release  Past Medical History:   Diagnosis Date   • Anxiety    • Carpal tunnel syndrome 03/2022   • Depression    • Suicide attempt [facility])        Past Surgical History:   Procedure Laterality Date   • KNEE ARTHROSCOPY      knee    • DC REVISE MEDIAN N/CARPAL TUNNEL SURG Left 4/15/2022    Procedure: RELEASE CARPAL TUNNEL;  Surgeon: Vinay Mandujano;  Location: [facility];  Service: Orthopedics   • WISDOM TOOTH EXTRACTION         Social History     Socioeconomic History   • Marital status: /Civil Union     Spouse name: Not on file   • Number of children: Not on file   • Years of education: Not on file   • Highest education level: Not on file   Occupational History   • Not on file   Tobacco Use   • Smoking status: Current Every Day Smoker     Packs/day: 0 50     Years: 34 00     Pack years: 17 00     Types: Cigarettes   • Smokeless tobacco: Never Used   Vaping Use   • Vaping Use: Never used   Substance and Sexual Activity   • Alcohol use: Not Currently   • Drug use: No   • Sexual activity: Yes     Partners: Male     Birth control/protection: Spermicide   Other Topics Concern   • Not on file   Social History Narrative   • Not on file     Social Determinants of Health     Financial Resource Strain: Not on file   Food Insecurity: Not on file   Transportation Needs: Not on file   Physical Activity: Not on file   Stress: Stress Concern Present   • Feeling of Stress :  To some extent   Social Connections: Not on file   Intimate Partner Violence: Not on file   Housing Stability: Not on file       Family History   Problem Relation Age of Onset   • Depression Mother    • Bipolar disorder Father    • Schizoaffective Disorder  Sister         Patient's Medications   New Prescriptions    No medications on file   Previous Medications    ACETAMINOPHEN (TYLENOL) 650 MG CR TABLET    Take 1 tablet (650 mg total) by mouth every 8 (eight) hours as needed for mild pain    MULTIPLE VITAMINS-MINERALS (MULTIVITAMIN WITH MINERALS) TABLET    Take 1 tablet by mouth daily    TURMERIC (QC TUMERIC COMPLEX PO)    Take by mouth in the morning      VENLAFAXINE (EFFEXOR-XR) 150 MG 24 HR CAPSULE    Take 150 mg by mouth 2 (two) times a day   Modified Medications    No medications on file   Discontinued Medications    No medications on file       Allergies   Allergen Reactions   • Bee Pollen Anaphylaxis     Other reaction(s): Other (See Comments)   • Wasp Venom Anaphylaxis   • Oxycodone-Acetaminophen Hives and Itching     vomiting and dizziness   • Pentosan Polysulfate Hives and Itching   • Pollen Extract Other (See Comments)   • Sulfa Antibiotics Hives, Vomiting, Itching and Rash   • Sulfasalazine Itching and Rash        /86 (BP Location: Left arm, Patient Position: Sitting)   Pulse (!) 112   Resp 16   Ht 5' 4 5" (1 638 m)   Wt 70 3 kg (155 lb)   BMI 26 19 kg/m²      REVIEW OF SYSTEMS:  Constitutional: Negative  HEENT: Negative  Respiratory: Negative  Skin: Negative  Neurological: Negative  Psychiatric/Behavioral: Negative  Musculoskeletal: Negative except for that mentioned in the HPI      /86 (BP Location: Left arm, Patient Position: Sitting)   Pulse (!) 112   Resp 16   Ht 5' 4 5" (1 638 m)   Wt 70 3 kg (155 lb)   BMI 26 19 kg/m²   Gen: No acute distress, resting comfortably in bed  HEENT: Eyes clear, moist mucus membranes, hearing intact  Respiratory: No audible wheezing or stridor  Cardiovascular: Well Perfused peripherally, 2+ distal pulse  Abdomen: nondistended, no peritoneal signs    PHYSICAL EXAM:     Right hand:    Durkan's test: positive  Tinel's test: Positive  Phalen's test: Positive  Median Nerve: decreased sensation  Ulnar Nerve: Normal  Thenar Muscle atrophy: Positive  Hypothenar Muscle atrophy: Negative    Left Hand:     Incision well healed  Durkan's test: Negative  Tinel's test: Negative  Phalen's test: Negative  Median Nerve: Normal  Ulnar Nerve: Normal  Thenar Muscle atrophy: Negative  Hypothenar Muscle atrophy: Negative    IMAGING:  none    ASSESSMENT AND PLAN:  48 y o  female  presents with right carpal tunnel syndrome  The left is completely resolved if her surgery  She is interested in setting up with right carpal tunnel release  We did discuss the procedure recovery in detail  Informed consent was signed  The patient understands the risks and benefits of the procedure with risks including pain, stiffness, infection, neurovascular injury, recurrence of symptoms, failure of surgical procedure, inadvertent intraoperative complications, blood loss, blood clots, allergic reaction to anesthesia, stroke, heart attack, all up to and including to death  The patient understood and did consent for surgery today       Scribe Attestation      I,:  Pawan South PA-C am acting as a scribe while in the presence of the attending physician :       I,:  Wyatt Truong personally performed the services described in this documentation    as scribed in my presence :

## 2022-10-18 NOTE — H&P
[facility] - REJI L KRAKAU [facility] CARE SPECIALISTS [place]  Alonzo Ray [address] [place] [phone]       Michelle Beckett  [id number]  1975    ORTHOPAEDIC SURGERY OUTPATIENT NOTE  10/18/2022      HISTORY:  48 y o  female  presents for right carpal tunnel syndrome  She had a left carpal tunnel release on 04/15/2022  The hand is doing well  She continues with pain and numbness in the right hand  She is interested in setting up for the right hand carpal tunnel release  Past Medical History:   Diagnosis Date   • Anxiety    • Carpal tunnel syndrome 03/2022   • Depression    • Suicide attempt [facility])        Past Surgical History:   Procedure Laterality Date   • KNEE ARTHROSCOPY      knee    • [place] REVISE MEDIAN N/CARPAL TUNNEL SURG Left 4/15/2022    Procedure: RELEASE CARPAL TUNNEL;  Surgeon: Jaylyn Alejandre;  Location: [facility];  Service: Orthopedics   • WISDOM TOOTH EXTRACTION         Social History     Socioeconomic History   • Marital status: /Civil Union     Spouse name: Not on file   • Number of children: Not on file   • Years of education: Not on file   • Highest education level: Not on file   Occupational History   • Not on file   Tobacco Use   • Smoking status: Current Every Day Smoker     Packs/day: 0 50     Years: 34 00     Pack years: 17 00     Types: Cigarettes   • Smokeless tobacco: Never Used   Vaping Use   • Vaping Use: Never used   Substance and Sexual Activity   • Alcohol use: Not Currently   • Drug use: No   • Sexual activity: Yes     Partners: Male     Birth control/protection: Spermicide   Other Topics Concern   • Not on file   Social History Narrative   • Not on file     Social Determinants of Health     Financial Resource Strain: Not on file   Food Insecurity: Not on file   Transportation Needs: Not on file   Physical Activity: Not on file   Stress: Stress Concern Present   • Feeling of Stress :  To some extent   Social Connections: Not on file   Intimate Partner Violence: Not on file   Housing Stability: Not on file       Family History   Problem Relation Age of Onset   • Depression Mother    • Bipolar disorder Father    • Schizoaffective Disorder  Sister         Patient's Medications   New Prescriptions    No medications on file   Previous Medications    ACETAMINOPHEN (TYLENOL) 650 MG CR TABLET    Take 1 tablet (650 mg total) by mouth every 8 (eight) hours as needed for mild pain    MULTIPLE VITAMINS-MINERALS (MULTIVITAMIN WITH MINERALS) TABLET    Take 1 tablet by mouth daily    TURMERIC (QC TUMERIC COMPLEX PO)    Take by mouth in the morning      VENLAFAXINE (EFFEXOR-XR) 150 MG 24 HR CAPSULE    Take 150 mg by mouth 2 (two) times a day   Modified Medications    No medications on file   Discontinued Medications    No medications on file       Allergies   Allergen Reactions   • Bee Pollen Anaphylaxis     Other reaction(s): Other (See Comments)   • Wasp Venom Anaphylaxis   • Oxycodone-Acetaminophen Hives and Itching     vomiting and dizziness   • Pentosan Polysulfate Hives and Itching   • Pollen Extract Other (See Comments)   • Sulfa Antibiotics Hives, Vomiting, Itching and Rash   • Sulfasalazine Itching and Rash        /86 (BP Location: Left arm, Patient Position: Sitting)   Pulse (!) 112   Resp 16   Ht 5' 4 5" (1 638 m)   Wt 70 3 kg (155 lb)   BMI 26 19 kg/m²      REVIEW OF SYSTEMS:  Constitutional: Negative  HEENT: Negative  Respiratory: Negative  Skin: Negative  Neurological: Negative  Psychiatric/Behavioral: Negative  Musculoskeletal: Negative except for that mentioned in the HPI      /86 (BP Location: Left arm, Patient Position: Sitting)   Pulse (!) 112   Resp 16   Ht 5' 4 5" (1 638 m)   Wt 70 3 kg (155 lb)   BMI 26 19 kg/m²   Gen: No acute distress, resting comfortably in bed  HEENT: Eyes clear, moist mucus membranes, hearing intact  Respiratory: No audible wheezing or stridor  Cardiovascular: Well Perfused peripherally, 2+ distal pulse  Abdomen: nondistended, no peritoneal signs    PHYSICAL EXAM:     Right hand:    Durkan's test: positive  Tinel's test: Positive  Phalen's test: Positive  Median Nerve: decreased sensation  Ulnar Nerve: Normal  Thenar Muscle atrophy: Positive  Hypothenar Muscle atrophy: Negative    Left Hand:     Incision well healed  Durkan's test: Negative  Tinel's test: Negative  Phalen's test: Negative  Median Nerve: Normal  Ulnar Nerve: Normal  Thenar Muscle atrophy: Negative  Hypothenar Muscle atrophy: Negative    IMAGING:  none    ASSESSMENT AND PLAN:  48 y o  female  presents with right carpal tunnel syndrome  The left is completely resolved if her surgery  She is interested in setting up with right carpal tunnel release  We did discuss the procedure recovery in detail  Informed consent was signed  The patient understands the risks and benefits of the procedure with risks including pain, stiffness, infection, neurovascular injury, recurrence of symptoms, failure of surgical procedure, inadvertent intraoperative complications, blood loss, blood clots, allergic reaction to anesthesia, stroke, heart attack, all up to and including to death  The patient understood and did consent for surgery today         Scribe Attestation      I,:  Maryetta Apley, PA-C am acting as a scribe while in the presence of the attending physician :       I,:  Lazaro Jurado personally performed the services described in this documentation    as scribed in my presence :

## 2022-10-18 NOTE — H&P (VIEW-ONLY)
[facility] - REJI L KRAKAU [facility] CARE SPECIALISTS [place]  Alonzo Ray [address] [place] [phone]       Arvind Flores  [id number]  1975    ORTHOPAEDIC SURGERY OUTPATIENT NOTE  10/18/2022      HISTORY:  48 y o  female  presents for right carpal tunnel syndrome  She had a left carpal tunnel release on 04/15/2022  The hand is doing well  She continues with pain and numbness in the right hand  She is interested in setting up for the right hand carpal tunnel release  Past Medical History:   Diagnosis Date   • Anxiety    • Carpal tunnel syndrome 03/2022   • Depression    • Suicide attempt [facility])        Past Surgical History:   Procedure Laterality Date   • KNEE ARTHROSCOPY      knee    • [place] REVISE MEDIAN N/CARPAL TUNNEL SURG Left 4/15/2022    Procedure: RELEASE CARPAL TUNNEL;  Surgeon: Amber Smipson;  Location:  MAIN OR;  Service: Orthopedics   • WISDOM TOOTH EXTRACTION         Social History     Socioeconomic History   • Marital status: /Civil Union     Spouse name: Not on file   • Number of children: Not on file   • Years of education: Not on file   • Highest education level: Not on file   Occupational History   • Not on file   Tobacco Use   • Smoking status: Current Every Day Smoker     Packs/day: 0 50     Years: 34 00     Pack years: 17 00     Types: Cigarettes   • Smokeless tobacco: Never Used   Vaping Use   • Vaping Use: Never used   Substance and Sexual Activity   • Alcohol use: Not Currently   • Drug use: No   • Sexual activity: Yes     Partners: Male     Birth control/protection: Spermicide   Other Topics Concern   • Not on file   Social History Narrative   • Not on file     Social Determinants of Health     Financial Resource Strain: Not on file   Food Insecurity: Not on file   Transportation Needs: Not on file   Physical Activity: Not on file   Stress: Stress Concern Present   • Feeling of Stress :  To some extent   Social Connections: Not on file   Intimate Partner Violence: Not on file   Housing Stability: Not on file       Family History   Problem Relation Age of Onset   • Depression Mother    • Bipolar disorder Father    • Schizoaffective Disorder  Sister         Patient's Medications   New Prescriptions    No medications on file   Previous Medications    ACETAMINOPHEN (TYLENOL) 650 MG CR TABLET    Take 1 tablet (650 mg total) by mouth every 8 (eight) hours as needed for mild pain    MULTIPLE VITAMINS-MINERALS (MULTIVITAMIN WITH MINERALS) TABLET    Take 1 tablet by mouth daily    TURMERIC (QC TUMERIC COMPLEX PO)    Take by mouth in the morning      VENLAFAXINE (EFFEXOR-XR) 150 MG 24 HR CAPSULE    Take 150 mg by mouth 2 (two) times a day   Modified Medications    No medications on file   Discontinued Medications    No medications on file       Allergies   Allergen Reactions   • Bee Pollen Anaphylaxis     Other reaction(s): Other (See Comments)   • Wasp Venom Anaphylaxis   • Oxycodone-Acetaminophen Hives and Itching     vomiting and dizziness   • Pentosan Polysulfate Hives and Itching   • Pollen Extract Other (See Comments)   • Sulfa Antibiotics Hives, Vomiting, Itching and Rash   • Sulfasalazine Itching and Rash        /86 (BP Location: Left arm, Patient Position: Sitting)   Pulse (!) 112   Resp 16   Ht 5' 4 5" (1 638 m)   Wt 70 3 kg (155 lb)   BMI 26 19 kg/m²      REVIEW OF SYSTEMS:  Constitutional: Negative  HEENT: Negative  Respiratory: Negative  Skin: Negative  Neurological: Negative  Psychiatric/Behavioral: Negative  Musculoskeletal: Negative except for that mentioned in the HPI      /86 (BP Location: Left arm, Patient Position: Sitting)   Pulse (!) 112   Resp 16   Ht 5' 4 5" (1 638 m)   Wt 70 3 kg (155 lb)   BMI 26 19 kg/m²   Gen: No acute distress, resting comfortably in bed  HEENT: Eyes clear, moist mucus membranes, hearing intact  Respiratory: No audible wheezing or stridor  Cardiovascular: Well Perfused peripherally, 2+ distal pulse  Abdomen: nondistended, no peritoneal signs    PHYSICAL EXAM:     Right hand:    Durkan's test: positive  Tinel's test: Positive  Phalen's test: Positive  Median Nerve: decreased sensation  Ulnar Nerve: Normal  Thenar Muscle atrophy: Positive  Hypothenar Muscle atrophy: Negative    Left Hand:     Incision well healed  Durkan's test: Negative  Tinel's test: Negative  Phalen's test: Negative  Median Nerve: Normal  Ulnar Nerve: Normal  Thenar Muscle atrophy: Negative  Hypothenar Muscle atrophy: Negative    IMAGING:  none    ASSESSMENT AND PLAN:  48 y o  female  presents with right carpal tunnel syndrome  The left is completely resolved if her surgery  She is interested in setting up with right carpal tunnel release  We did discuss the procedure recovery in detail  Informed consent was signed  The patient understands the risks and benefits of the procedure with risks including pain, stiffness, infection, neurovascular injury, recurrence of symptoms, failure of surgical procedure, inadvertent intraoperative complications, blood loss, blood clots, allergic reaction to anesthesia, stroke, heart attack, all up to and including to death  The patient understood and did consent for surgery today         Scribe Attestation      I,:  Amy Biggs PA-C am acting as a scribe while in the presence of the attending physician :       I,:  Darcy Nurse personally performed the services described in this documentation    as scribed in my presence :

## 2022-10-27 ENCOUNTER — TELEPHONE (OUTPATIENT)
Dept: OBGYN CLINIC | Facility: MEDICAL CENTER | Age: 47
End: 2022-10-27

## 2022-10-27 NOTE — TELEPHONE ENCOUNTER
Spoke with pt in regards to upcoming sx, pt does not have insurance, she stated she did apply for [facility] care and has been in touch with them

## 2022-11-10 NOTE — PRE-PROCEDURE INSTRUCTIONS
Pre-Surgery Instructions:   Medication Instructions   • acetaminophen (TYLENOL) 650 mg CR tablet Uses PRN- OK to take day of surgery   • Multiple Vitamins-Minerals (multivitamin with minerals) tablet Stop taking   • Turmeric (QC TUMERIC COMPLEX PO) Stop taking   • venlafaxine (EFFEXOR-XR) 150 mg 24 hr capsule Take day of surgery  Have you had / have a sore throat? No  Have you had / have a cough less than 1 week? No  Have you had / have a fever greater than 100 0 - 100  4? No  Are you experiencing any shortness of breath? No    Review with patient via phone medications and showering instructions  Instructed to avoid all ASA and OTC Vit/Supp  prior to surgery and to avoid NSAIDs 3 days prior to surgery per anesthesia instructions  Tylenol ok to take prn  [facility] call with surgery schedule time, nothing eat or drink after midnight  Verbalized understanding  Advise Smoking Cessation Education is interested

## 2022-11-14 ENCOUNTER — ANESTHESIA EVENT (OUTPATIENT)
Dept: PERIOP | Facility: HOSPITAL | Age: 47
End: 2022-11-14

## 2022-11-14 ENCOUNTER — ANESTHESIA (OUTPATIENT)
Dept: PERIOP | Facility: HOSPITAL | Age: 47
End: 2022-11-14

## 2022-11-14 ENCOUNTER — HOSPITAL ENCOUNTER (OUTPATIENT)
Facility: HOSPITAL | Age: 47
Setting detail: OUTPATIENT SURGERY
Discharge: HOME | End: 2022-11-14
Attending: ORTHOPAEDIC SURGERY | Admitting: ORTHOPAEDIC SURGERY

## 2022-11-14 VITALS
SYSTOLIC BLOOD PRESSURE: 118 MMHG | DIASTOLIC BLOOD PRESSURE: 76 MMHG | BODY MASS INDEX: 26.79 KG/M2 | OXYGEN SATURATION: 95 % | TEMPERATURE: 98 F | HEART RATE: 90 BPM | WEIGHT: 156.9 LBS | HEIGHT: 64 IN | RESPIRATION RATE: 16 BRPM

## 2022-11-14 DIAGNOSIS — G56.01 CARPAL TUNNEL SYNDROME ON RIGHT: Primary | ICD-10-CM

## 2022-11-14 DIAGNOSIS — T65.291A TOXIC EFFECT OF TOBACCO AND NICOTINE, ACCIDENTAL OR UNINTENTIONAL, INITIAL ENCOUNTER: ICD-10-CM

## 2022-11-14 LAB
EXT PREGNANCY TEST URINE: NEGATIVE
EXT. CONTROL: NORMAL

## 2022-11-14 RX ORDER — SCOLOPAMINE TRANSDERMAL SYSTEM 1 MG/1
PATCH, EXTENDED RELEASE TRANSDERMAL AS NEEDED
Status: DISCONTINUED | OUTPATIENT
Start: 2022-11-14 | End: 2022-11-14

## 2022-11-14 RX ORDER — ONDANSETRON 2 MG/ML
4 INJECTION INTRAMUSCULAR; INTRAVENOUS ONCE AS NEEDED
Status: DISCONTINUED | OUTPATIENT
Start: 2022-11-14 | End: 2022-11-14 | Stop reason: HOSPADM

## 2022-11-14 RX ORDER — SCOLOPAMINE TRANSDERMAL SYSTEM 1 MG/1
1 PATCH, EXTENDED RELEASE TRANSDERMAL ONCE
Status: DISCONTINUED | OUTPATIENT
Start: 2022-11-14 | End: 2022-11-14 | Stop reason: HOSPADM

## 2022-11-14 RX ORDER — CEFAZOLIN SODIUM 1 G/50ML
1000 SOLUTION INTRAVENOUS ONCE
Status: COMPLETED | OUTPATIENT
Start: 2022-11-14 | End: 2022-11-14

## 2022-11-14 RX ORDER — ONDANSETRON 2 MG/ML
INJECTION INTRAMUSCULAR; INTRAVENOUS AS NEEDED
Status: DISCONTINUED | OUTPATIENT
Start: 2022-11-14 | End: 2022-11-14

## 2022-11-14 RX ORDER — SODIUM CHLORIDE, SODIUM LACTATE, POTASSIUM CHLORIDE, CALCIUM CHLORIDE 600; 310; 30; 20 MG/100ML; MG/100ML; MG/100ML; MG/100ML
INJECTION, SOLUTION INTRAVENOUS CONTINUOUS PRN
Status: DISCONTINUED | OUTPATIENT
Start: 2022-11-14 | End: 2022-11-14

## 2022-11-14 RX ORDER — DEXAMETHASONE SODIUM PHOSPHATE 10 MG/ML
INJECTION, SOLUTION INTRAMUSCULAR; INTRAVENOUS AS NEEDED
Status: DISCONTINUED | OUTPATIENT
Start: 2022-11-14 | End: 2022-11-14

## 2022-11-14 RX ORDER — CEPHALEXIN 500 MG/1
500 CAPSULE ORAL EVERY 6 HOURS SCHEDULED
Qty: 8 CAPSULE | Refills: 0 | Status: SHIPPED | OUTPATIENT
Start: 2022-11-14 | End: 2022-11-16

## 2022-11-14 RX ORDER — CHLORHEXIDINE GLUCONATE 4 G/100ML
SOLUTION TOPICAL DAILY PRN
Status: DISCONTINUED | OUTPATIENT
Start: 2022-11-14 | End: 2022-11-14 | Stop reason: HOSPADM

## 2022-11-14 RX ORDER — PROPOFOL 10 MG/ML
INJECTION, EMULSION INTRAVENOUS AS NEEDED
Status: DISCONTINUED | OUTPATIENT
Start: 2022-11-14 | End: 2022-11-14

## 2022-11-14 RX ORDER — FENTANYL CITRATE 50 UG/ML
INJECTION, SOLUTION INTRAMUSCULAR; INTRAVENOUS AS NEEDED
Status: DISCONTINUED | OUTPATIENT
Start: 2022-11-14 | End: 2022-11-14

## 2022-11-14 RX ORDER — PROPOFOL 10 MG/ML
INJECTION, EMULSION INTRAVENOUS CONTINUOUS PRN
Status: DISCONTINUED | OUTPATIENT
Start: 2022-11-14 | End: 2022-11-14

## 2022-11-14 RX ORDER — FENTANYL CITRATE/PF 50 MCG/ML
25 SYRINGE (ML) INJECTION
Status: DISCONTINUED | OUTPATIENT
Start: 2022-11-14 | End: 2022-11-14 | Stop reason: HOSPADM

## 2022-11-14 RX ORDER — OXYCODONE HYDROCHLORIDE AND ACETAMINOPHEN 5; 325 MG/1; MG/1
1 TABLET ORAL ONCE AS NEEDED
Status: DISCONTINUED | OUTPATIENT
Start: 2022-11-14 | End: 2022-11-14 | Stop reason: HOSPADM

## 2022-11-14 RX ORDER — MIDAZOLAM HYDROCHLORIDE 2 MG/2ML
INJECTION, SOLUTION INTRAMUSCULAR; INTRAVENOUS AS NEEDED
Status: DISCONTINUED | OUTPATIENT
Start: 2022-11-14 | End: 2022-11-14

## 2022-11-14 RX ORDER — CHLORHEXIDINE GLUCONATE 0.12 MG/ML
15 RINSE ORAL ONCE
Status: COMPLETED | OUTPATIENT
Start: 2022-11-14 | End: 2022-11-14

## 2022-11-14 RX ADMIN — FENTANYL CITRATE 50 MCG: 50 INJECTION, SOLUTION INTRAMUSCULAR; INTRAVENOUS at 13:22

## 2022-11-14 RX ADMIN — ONDANSETRON 4 MG: 2 INJECTION INTRAMUSCULAR; INTRAVENOUS at 13:26

## 2022-11-14 RX ADMIN — DEXAMETHASONE SODIUM PHOSPHATE 8 MG: 10 INJECTION, SOLUTION INTRAMUSCULAR; INTRAVENOUS at 13:23

## 2022-11-14 RX ADMIN — SODIUM CHLORIDE, SODIUM LACTATE, POTASSIUM CHLORIDE, AND CALCIUM CHLORIDE: .6; .31; .03; .02 INJECTION, SOLUTION INTRAVENOUS at 12:50

## 2022-11-14 RX ADMIN — CEFAZOLIN SODIUM 1000 MG: 1 SOLUTION INTRAVENOUS at 13:20

## 2022-11-14 RX ADMIN — MIDAZOLAM 2 MG: 1 INJECTION INTRAMUSCULAR; INTRAVENOUS at 13:15

## 2022-11-14 RX ADMIN — CHLORHEXIDINE GLUCONATE 0.12% ORAL RINSE 15 ML: 1.2 LIQUID ORAL at 12:01

## 2022-11-14 RX ADMIN — SCOPALAMINE 1 PATCH: 1 PATCH, EXTENDED RELEASE TRANSDERMAL at 12:50

## 2022-11-14 RX ADMIN — PROPOFOL 20 MG: 10 INJECTION, EMULSION INTRAVENOUS at 13:21

## 2022-11-14 RX ADMIN — PROPOFOL 100 MCG/KG/MIN: 10 INJECTION, EMULSION INTRAVENOUS at 13:20

## 2022-11-14 NOTE — ANESTHESIA PREPROCEDURE EVALUATION
Procedure:  RELEASE CARPAL TUNNEL-right (Right Wrist)    Relevant Problems   CARDIO   (+) Mixed hyperlipidemia      NEURO/PSYCH   (+) Generalized anxiety disorder   (+) Major depressive disorder   (+) Major depressive disorder, recurrent severe without psychotic features (HCC)        Physical Exam    Airway    Mallampati score: II  TM Distance: >3 FB  Neck ROM: full     Dental   No notable dental hx     Cardiovascular  Cardiovascular exam normal    Pulmonary  Pulmonary exam normal     Other Findings        Anesthesia Plan  ASA Score- 2     Anesthesia Type- IV sedation with anesthesia with ASA Monitors  Additional Monitors:   Airway Plan:           Plan Factors-Exercise tolerance (METS): >4 METS  Chart reviewed  Imaging results reviewed  Existing labs reviewed  Patient summary reviewed  Patient is not a current smoker  Induction-     Postoperative Plan-     Informed Consent- Anesthetic plan and risks discussed with patient  I personally reviewed this patient with the CRNA  Discussed and agreed on the Anesthesia Plan with the CRNA  Jennifer Madison

## 2022-11-14 NOTE — ANESTHESIA POSTPROCEDURE EVALUATION
Post-Op Assessment Note    CV Status:  Stable  Pain Score: 0    Pain management: adequate     Mental Status:  Alert and awake   Hydration Status:  Euvolemic   PONV Controlled:  Controlled   Airway Patency:  Patent      Post Op Vitals Reviewed: Yes      Staff: CRNA         No complications documented      /63 (11/14/22 1351)    Temp (!) 97 2 °F (36 2 °C) (11/14/22 1351)    Pulse 98 (11/14/22 1351)   Resp 20 (11/14/22 1351)    SpO2 96 % (11/14/22 1351)

## 2022-11-14 NOTE — INTERVAL H&P NOTE
H&P reviewed  After examining the patient I find no changes in the patients condition since the H&P had been written      Vitals:    11/14/22 1217   BP: 133/82   Pulse: 90   Resp: 21   Temp: 97 6 °F (36 4 °C)   SpO2: 95%     Plan for right carpal tunnel release today

## 2022-11-14 NOTE — DISCHARGE INSTRUCTIONS
Jonathon Deleon DO    Orthopedic Surgery, Shoulder/Elbow and Sports Medicine  [facility]   [address] [facility], [address] [facility] [place]  Phone: [phone]    General Post-op Surgical Instructions:    Date of Procedure - 11/14/22     Procedure - {CC LATERALITY:27090} Carpal Tunnel Release    Weight Bearing Status - Please limit your weight bearing with the operative hand to <5lbs for the 1st week after surgery  DVT Prophylaxis and Duration - None required    PT/OT Instructions - Therapy will be discussed at your first postoperative visit  In the meantime, please make sure to move your wrist/fingers periodically throughout the day to prevent stiffness and help with swelling  Stitches/Staples - Removed at 7-10 days postop; we will then place steristrips on incision site    Wound Care - Please keep dressings clean, dry and intact for 3 days  After 3 days, you may remove your surgical dressing and apply clean Band-Aids to your incision  Additional Info - Please make sure to complete your course of antibiotics as instructed  Any questions or concerns please call [phone] please!

## 2022-11-14 NOTE — OP NOTE
OPERATIVE REPORT  PATIENT NAME: Gisel Brothers    :  1975  MRN: [id number]  Pt Location: [facility] OR ROOM 01    SURGERY DATE: 2022    Surgeon(s) and Role:     * Jonathon Deleon - Primary     * Keara Bateman PA-C - Assisting    Preop Diagnosis:  Carpal tunnel syndrome on right [G56 01]    Post-Op Diagnosis Codes:     * Carpal tunnel syndrome on right [G56 01]    Procedure(s) (LRB):  RELEASE CARPAL TUNNEL-right (Right)    Specimen(s):  * No specimens in log *    Estimated Blood Loss:   Minimal    Drains:  * No LDAs found *    Anesthesia Type:   Choice    Operative Indications:  Carpal tunnel syndrome on right [G56 01]      Operative Findings: Thickened transverse carpal ligament    Complications:   None    Procedure and Technique:    INDICATIONS AND HISTORY:  This is a 50 y o  female who was diagnosed with right carpal tunnel syndrome  Patient failed conservative management and was indicated for right carpal tunnel release  The patient understood the risks and benefits of the procedure with risks including pain, recurrent symptoms, neurovascular injury, blood loss, blood clots, infection, wound dehiscence, stiffness, stroke, heart attack, all up to including death  Patient understood and consented for surgery today  DESCRIPTION OF OPERATION:  The patient was identified, and the procedure was verified  The patient was seen in pre-op holding area where the operative extremity was marked  The patient was taken to the operating room and placed in supine position  A time-out was called  The patient was administered IV antibiotics prior to incision  Using a 15-blade knife, incision made over the carpal tunnel  Subcutaneous dissection was taken down to the palmar fascia  Palmar fascia was incised and transverse carpal ligament was identified  The transverse carpal ligament was then incised and the median nerve was identified    Once the median nerve was protected, the transverse carpal ligament was fully released proximally and distally with combination of the 15-blade knife and Metzenbaum scissors  Once the transverse carpal ligament was released, the median nerve was evaluated using a [place] elevator and showed  that it was completely released from the transverse carpal ligament  The tourniquet was taken down  Hemostasis was undertaken with bipolar cautery  The wound was copiously irrigated with normal saline  The wound was then closed with 3-0 nylon in a vertical mattress fashion  Dressings included Xeroform, 4 x 4 gauze, Lucía, and Ace bandage dressing  Patient tolerated the procedure well  There were no complications throughout the  case  Patient to PACU in stable condition  A physician assistant was medically necessary to help with instrumentation and wound closure  The physician assistant role reduces the time of procedure and the time the patient is under anesthesia  No qualified resident was available for the procedure      I was present for the entire procedure, A qualified resident physician was not available and A physician assistant was required during the procedure for retraction tissue handling,dissection and suturing    Patient Disposition:  PACU         SIGNATURE: Jonathon Deleon  DATE: November 14, 2022  TIME: 2:00 PM

## 2022-11-22 ENCOUNTER — OFFICE VISIT (OUTPATIENT)
Dept: OBGYN CLINIC | Facility: MEDICAL CENTER | Age: 47
End: 2022-11-22

## 2022-11-22 VITALS
BODY MASS INDEX: 26.63 KG/M2 | HEART RATE: 76 BPM | HEIGHT: 64 IN | SYSTOLIC BLOOD PRESSURE: 134 MMHG | WEIGHT: 156 LBS | RESPIRATION RATE: 16 BRPM | DIASTOLIC BLOOD PRESSURE: 82 MMHG

## 2022-11-22 DIAGNOSIS — Z98.890 S/P CARPAL TUNNEL RELEASE: Primary | ICD-10-CM

## 2022-11-22 NOTE — PROGRESS NOTES
[facility] - [facility] [place]  Alonzo Ray [address] [place] [id number]       Minor Sides  [id number]  1975    ORTHOPAEDIC SURGERY OUTPATIENT NOTE  11/22/2022      HISTORY:  50 y o  female who presents the office today 8 days s/p right carpal tunnel release, DOS 11/14/2022  She states that she is doing well postoperatively  She states that her numbness and tingling has resolved       Past Medical History:   Diagnosis Date   • Anxiety    • Carpal tunnel syndrome 03/2022   • Depression    • PONV (postoperative nausea and vomiting)    • Suicide attempt [facility])        Past Surgical History:   Procedure Laterality Date   • KNEE ARTHROSCOPY      knee    • [place] REVISE MEDIAN N/CARPAL TUNNEL SURG Left 4/15/2022    Procedure: RELEASE CARPAL TUNNEL;  Surgeon: Roxanne Alatorre;  Location:  MAIN OR;  Service: Orthopedics   • [place] REVISE MEDIAN N/CARPAL TUNNEL SURG Right 11/14/2022    Procedure: RELEASE CARPAL TUNNEL-right;  Surgeon: Roxanne Alatorre;  Location:  MAIN OR;  Service: Orthopedics   • WISDOM TOOTH EXTRACTION         Social History     Socioeconomic History   • Marital status: /Civil Union     Spouse name: Not on file   • Number of children: Not on file   • Years of education: Not on file   • Highest education level: Not on file   Occupational History   • Not on file   Tobacco Use   • Smoking status: Every Day     Packs/day: 0 50     Years: 34 00     Pack years: 17 00     Types: Cigarettes   • Smokeless tobacco: Never   Vaping Use   • Vaping Use: Never used   Substance and Sexual Activity   • Alcohol use: Not Currently   • Drug use: Never   • Sexual activity: Yes     Partners: Male     Birth control/protection: Spermicide   Other Topics Concern   • Not on file   Social History Narrative   • Not on file     Social Determinants of Health     Financial Resource Strain: Not on file   Food Insecurity: Not on file   Transportation Needs: Not on file   Physical Activity: Not on file   Stress: Stress Concern Present   • Feeling of Stress : To some extent   Social Connections: Not on file   Intimate Partner Violence: Not on file   Housing Stability: Not on file       Family History   Problem Relation Age of Onset   • Depression Mother    • Bipolar disorder Father    • Schizoaffective Disorder  Sister         Patient's Medications   New Prescriptions    No medications on file   Previous Medications    ACETAMINOPHEN (TYLENOL) 650 MG CR TABLET    Take 1 tablet (650 mg total) by mouth every 8 (eight) hours as needed for mild pain    MULTIPLE VITAMINS-MINERALS (MULTIVITAMIN WITH MINERALS) TABLET    Take 1 tablet by mouth daily    TURMERIC (QC TUMERIC COMPLEX PO)    Take by mouth in the morning      VENLAFAXINE (EFFEXOR-XR) 150 MG 24 HR CAPSULE    Take 150 mg by mouth 2 (two) times a day   Modified Medications    No medications on file   Discontinued Medications    No medications on file       Allergies   Allergen Reactions   • Bee Pollen Anaphylaxis     Other reaction(s): Other (See Comments)   • Wasp Venom Anaphylaxis   • Oxycodone-Acetaminophen Hives and Itching     vomiting and dizziness   • Pentosan Polysulfate Hives and Itching   • Pollen Extract Other (See Comments)   • Sulfa Antibiotics Hives, Vomiting, Itching and Rash   • Sulfasalazine Itching and Rash        There were no vitals taken for this visit  REVIEW OF SYSTEMS:  Constitutional: Negative  HEENT: Negative  Respiratory: Negative  Skin: Negative  Neurological: Negative  Psychiatric/Behavioral: Negative  Musculoskeletal: Negative except for that mentioned in the HPI  There were no vitals taken for this visit  Gen: Alert and oriented to person, place, time  HEENT: EOMI, eyes clear, moist mucus membranes, hearing intact  Respiratory: Bilateral chest rise   No audible wheezing found  Cardiovascular: Regular Rate and Rhythm  Abdomen: soft nontender/nondistended     PHYSICAL EXAM:  Right Hand  Healing incision  No erythema, warmth or signs of infection  Compartments soft  Brisk capillary refill  S/m intact median, radial, and ulnar nerve     IMAGING: No new images reviewed today  ASSESSMENT AND PLAN:  50 y o  female 8 days s/p right carpal tunnel release, DOS 11/14/2022    She is doing well postoperatively  Sutures removed in the office today without any complications and steri strips were applied  The incision is healing well with no erythema, warmth or signs of infections  At the time, the patient may get the incision wet but should refrain from any soaking or scrubbing of the incision  I discussed with the patient that once incision has healed she may begin scar massage 2-3 times per day for 5 minutes to desensitize and reduce scar adhesions  I will follow-up with her as needed  She understood and had no further questions      Scribe Attestation    I,:  Jana Marques am acting as a scribe while in the presence of the attending physician :       I,:  Morro Gutierrez personally performed the services described in this documentation    as scribed in my presence :

## (undated) DEVICE — 3M™ STERI-STRIP™ REINFORCED ADHESIVE SKIN CLOSURES, R1547, 1/2 IN X 4 IN (12 MM X 100 MM), 6 STRIPS/ENVELOPE: Brand: 3M™ STERI-STRIP™

## (undated) DEVICE — 10FR FRAZIER SUCTION HANDLE: Brand: CARDINAL HEALTH

## (undated) DEVICE — DISPOSABLE EQUIPMENT COVER: Brand: SMALL TOWEL DRAPE

## (undated) DEVICE — GAUZE SPONGES,16 PLY: Brand: CURITY

## (undated) DEVICE — STOCKINETTE REGULAR

## (undated) DEVICE — STERILE BETHLEHEM PLASTIC HAND: Brand: CARDINAL HEALTH

## (undated) DEVICE — TOURNIQUET 12 IN STERILE SINGLE

## (undated) DEVICE — GLOVE SRG BIOGEL 8

## (undated) DEVICE — GLOVE INDICATOR PI UNDERGLOVE SZ 8 BLUE

## (undated) DEVICE — U-DRAPE: Brand: CONVERTORS

## (undated) DEVICE — TUBING SUCTION 5MM X 12 FT

## (undated) DEVICE — INTENDED FOR TISSUE SEPARATION, AND OTHER PROCEDURES THAT REQUIRE A SHARP SURGICAL BLADE TO PUNCTURE OR CUT.: Brand: BARD-PARKER ® CARBON RIB-BACK BLADES

## (undated) DEVICE — OCCLUSIVE GAUZE STRIP,3% BISMUTH TRIBROMOPHENATE IN PETROLATUM BLEND: Brand: XEROFORM

## (undated) DEVICE — ADHESIVE SKIN HIGH VISCOSITY EXOFIN 1ML

## (undated) DEVICE — ACE WRAP 3 IN STERILE

## (undated) DEVICE — SUT MONOCRYL 3-0 PS-2 27 IN Y427H

## (undated) DEVICE — GLOVE SRG BIOGEL 7.5

## (undated) DEVICE — DRAPE SHEET THREE QUARTER

## (undated) DEVICE — LIGHT HANDLE COVER SLEEVE DISP BLUE STELLAR

## (undated) DEVICE — PAD CAST 4 IN COTTON NON STERILE

## (undated) DEVICE — SUT ETHILON 3-0 FS-1 18 IN 663G

## (undated) DEVICE — NEEDLE 25G X 1 1/2

## (undated) DEVICE — ABDOMINAL PAD: Brand: DERMACEA

## (undated) DEVICE — BIPOLAR CORD DISP

## (undated) DEVICE — BETHLEHEM UNIVERSAL  MIONR EXT: Brand: CARDINAL HEALTH

## (undated) DEVICE — NON-STERILE REUSABLE TOURNIQUET CUFF SINGLE BLADDER, DUAL PORT AND QUICK CONNECT CONNECTOR: Brand: COLOR CUFF

## (undated) DEVICE — GLOVE SRG BIOGEL 6.5

## (undated) DEVICE — CHLORAPREP HI-LITE 10.5ML ORANGE

## (undated) DEVICE — GLOVE INDICATOR PI UNDERGLOVE SZ 7 BLUE